# Patient Record
Sex: FEMALE | Race: WHITE | Employment: FULL TIME | URBAN - METROPOLITAN AREA
[De-identification: names, ages, dates, MRNs, and addresses within clinical notes are randomized per-mention and may not be internally consistent; named-entity substitution may affect disease eponyms.]

---

## 2017-04-24 ENCOUNTER — APPOINTMENT (OUTPATIENT)
Dept: CT IMAGING | Age: 57
End: 2017-04-24
Attending: EMERGENCY MEDICINE
Payer: COMMERCIAL

## 2017-04-24 ENCOUNTER — HOSPITAL ENCOUNTER (EMERGENCY)
Age: 57
Discharge: HOME | End: 2017-04-24
Attending: EMERGENCY MEDICINE
Payer: COMMERCIAL

## 2017-04-24 VITALS
HEIGHT: 68 IN | BODY MASS INDEX: 39.4 KG/M2 | OXYGEN SATURATION: 98 % | HEART RATE: 76 BPM | DIASTOLIC BLOOD PRESSURE: 84 MMHG | WEIGHT: 260 LBS | SYSTOLIC BLOOD PRESSURE: 154 MMHG | TEMPERATURE: 98 F | RESPIRATION RATE: 18 BRPM

## 2017-04-24 DIAGNOSIS — N23 RENAL COLIC: Primary | ICD-10-CM

## 2017-04-24 DIAGNOSIS — R31.9 HEMATURIA: ICD-10-CM

## 2017-04-24 LAB
ALBUMIN SERPL BCP-MCNC: 3.7 G/DL (ref 3.5–5)
ALBUMIN/GLOB SERPL: 0.8 {RATIO} (ref 1.2–3.5)
ALP SERPL-CCNC: 106 U/L (ref 50–136)
ALT SERPL-CCNC: 31 U/L (ref 12–65)
ANION GAP BLD CALC-SCNC: 11 MMOL/L (ref 7–16)
AST SERPL W P-5'-P-CCNC: 29 U/L (ref 15–37)
BACTERIA URNS QL MICRO: ABNORMAL /HPF
BASOPHILS # BLD AUTO: 0 K/UL (ref 0–0.2)
BASOPHILS # BLD: 0 % (ref 0–2)
BILIRUB SERPL-MCNC: 0.3 MG/DL (ref 0.2–1.1)
BUN SERPL-MCNC: 16 MG/DL (ref 6–23)
CALCIUM SERPL-MCNC: 9.3 MG/DL (ref 8.3–10.4)
CASTS URNS QL MICRO: 0 /LPF
CHLORIDE SERPL-SCNC: 105 MMOL/L (ref 98–107)
CO2 SERPL-SCNC: 25 MMOL/L (ref 21–32)
CREAT SERPL-MCNC: 0.91 MG/DL (ref 0.6–1)
CRYSTALS URNS QL MICRO: 0 /LPF
DIFFERENTIAL METHOD BLD: ABNORMAL
EOSINOPHIL # BLD: 0.1 K/UL (ref 0–0.8)
EOSINOPHIL NFR BLD: 1 % (ref 0.5–7.8)
EPI CELLS #/AREA URNS HPF: ABNORMAL /HPF
ERYTHROCYTE [DISTWIDTH] IN BLOOD BY AUTOMATED COUNT: 14 % (ref 11.9–14.6)
GLOBULIN SER CALC-MCNC: 4.5 G/DL (ref 2.3–3.5)
GLUCOSE SERPL-MCNC: 140 MG/DL (ref 65–100)
HCT VFR BLD AUTO: 41.2 % (ref 35.8–46.3)
HGB BLD-MCNC: 14 G/DL (ref 11.7–15.4)
IMM GRANULOCYTES # BLD: 0 K/UL (ref 0–0.5)
IMM GRANULOCYTES NFR BLD AUTO: 0.3 % (ref 0–5)
LYMPHOCYTES # BLD AUTO: 16 % (ref 13–44)
LYMPHOCYTES # BLD: 1.9 K/UL (ref 0.5–4.6)
MCH RBC QN AUTO: 30.4 PG (ref 26.1–32.9)
MCHC RBC AUTO-ENTMCNC: 34 G/DL (ref 31.4–35)
MCV RBC AUTO: 89.6 FL (ref 79.6–97.8)
MONOCYTES # BLD: 0.8 K/UL (ref 0.1–1.3)
MONOCYTES NFR BLD AUTO: 7 % (ref 4–12)
MUCOUS THREADS URNS QL MICRO: 0 /LPF
NEUTS SEG # BLD: 9 K/UL (ref 1.7–8.2)
NEUTS SEG NFR BLD AUTO: 76 % (ref 43–78)
PLATELET # BLD AUTO: 280 K/UL (ref 150–450)
PMV BLD AUTO: 12 FL (ref 10.8–14.1)
POTASSIUM SERPL-SCNC: 4.4 MMOL/L (ref 3.5–5.1)
PROT SERPL-MCNC: 8.2 G/DL (ref 6.3–8.2)
RBC # BLD AUTO: 4.6 M/UL (ref 4.05–5.25)
RBC #/AREA URNS HPF: >100 /HPF
SODIUM SERPL-SCNC: 141 MMOL/L (ref 136–145)
WBC # BLD AUTO: 11.8 K/UL (ref 4.3–11.1)
WBC URNS QL MICRO: ABNORMAL /HPF

## 2017-04-24 PROCEDURE — 74176 CT ABD & PELVIS W/O CONTRAST: CPT

## 2017-04-24 PROCEDURE — 99284 EMERGENCY DEPT VISIT MOD MDM: CPT | Performed by: EMERGENCY MEDICINE

## 2017-04-24 PROCEDURE — 74011250636 HC RX REV CODE- 250/636: Performed by: EMERGENCY MEDICINE

## 2017-04-24 PROCEDURE — 96376 TX/PRO/DX INJ SAME DRUG ADON: CPT | Performed by: EMERGENCY MEDICINE

## 2017-04-24 PROCEDURE — 96374 THER/PROPH/DIAG INJ IV PUSH: CPT | Performed by: EMERGENCY MEDICINE

## 2017-04-24 PROCEDURE — 96375 TX/PRO/DX INJ NEW DRUG ADDON: CPT | Performed by: EMERGENCY MEDICINE

## 2017-04-24 PROCEDURE — 80053 COMPREHEN METABOLIC PANEL: CPT | Performed by: EMERGENCY MEDICINE

## 2017-04-24 PROCEDURE — 85025 COMPLETE CBC W/AUTO DIFF WBC: CPT | Performed by: EMERGENCY MEDICINE

## 2017-04-24 PROCEDURE — 81003 URINALYSIS AUTO W/O SCOPE: CPT | Performed by: EMERGENCY MEDICINE

## 2017-04-24 PROCEDURE — 81015 MICROSCOPIC EXAM OF URINE: CPT | Performed by: EMERGENCY MEDICINE

## 2017-04-24 RX ORDER — SODIUM CHLORIDE 0.9 % (FLUSH) 0.9 %
5-10 SYRINGE (ML) INJECTION EVERY 8 HOURS
Status: DISCONTINUED | OUTPATIENT
Start: 2017-04-24 | End: 2017-04-24 | Stop reason: HOSPADM

## 2017-04-24 RX ORDER — ONDANSETRON 2 MG/ML
4 INJECTION INTRAMUSCULAR; INTRAVENOUS
Status: COMPLETED | OUTPATIENT
Start: 2017-04-24 | End: 2017-04-24

## 2017-04-24 RX ORDER — HYDROMORPHONE HYDROCHLORIDE 1 MG/ML
1 INJECTION, SOLUTION INTRAMUSCULAR; INTRAVENOUS; SUBCUTANEOUS
Status: COMPLETED | OUTPATIENT
Start: 2017-04-24 | End: 2017-04-24

## 2017-04-24 RX ORDER — KETOROLAC TROMETHAMINE 30 MG/ML
30 INJECTION, SOLUTION INTRAMUSCULAR; INTRAVENOUS
Status: COMPLETED | OUTPATIENT
Start: 2017-04-24 | End: 2017-04-24

## 2017-04-24 RX ORDER — OXYCODONE HYDROCHLORIDE 5 MG/1
5 TABLET ORAL
Qty: 15 TAB | Refills: 0 | Status: SHIPPED | OUTPATIENT
Start: 2017-04-24 | End: 2017-04-25 | Stop reason: CLARIF

## 2017-04-24 RX ORDER — PROMETHAZINE HYDROCHLORIDE 25 MG/1
25 TABLET ORAL
Qty: 12 TAB | Refills: 1 | Status: SHIPPED | OUTPATIENT
Start: 2017-04-24 | End: 2017-07-29 | Stop reason: ALTCHOICE

## 2017-04-24 RX ORDER — SODIUM CHLORIDE 0.9 % (FLUSH) 0.9 %
5-10 SYRINGE (ML) INJECTION AS NEEDED
Status: DISCONTINUED | OUTPATIENT
Start: 2017-04-24 | End: 2017-04-24 | Stop reason: HOSPADM

## 2017-04-24 RX ADMIN — ONDANSETRON 4 MG: 2 INJECTION INTRAMUSCULAR; INTRAVENOUS at 04:57

## 2017-04-24 RX ADMIN — HYDROMORPHONE HYDROCHLORIDE 1 MG: 1 INJECTION, SOLUTION INTRAMUSCULAR; INTRAVENOUS; SUBCUTANEOUS at 05:15

## 2017-04-24 RX ADMIN — HYDROMORPHONE HYDROCHLORIDE 1 MG: 1 INJECTION, SOLUTION INTRAMUSCULAR; INTRAVENOUS; SUBCUTANEOUS at 03:01

## 2017-04-24 RX ADMIN — ONDANSETRON 4 MG: 2 INJECTION INTRAMUSCULAR; INTRAVENOUS at 03:00

## 2017-04-24 RX ADMIN — KETOROLAC TROMETHAMINE 30 MG: 30 INJECTION, SOLUTION INTRAMUSCULAR at 03:00

## 2017-04-24 NOTE — ED TRIAGE NOTES
Pt c/o pain to back , chronic , worse last few day.  Pt states all pain meds make her nauseated and she needs large dosages of phenergan with pain meds

## 2017-04-24 NOTE — DISCHARGE INSTRUCTIONS
Kidney Stone: Care Instructions  Your Care Instructions    Kidney stones are formed when salts, minerals, and other substances normally found in the urine clump together. They can be as small as grains of sand or, rarely, as large as golf balls. While the stone is traveling through the ureter, which is the tube that carries urine from the kidney to the bladder, you will probably feel pain. The pain may be mild or very severe. You may also have some blood in your urine. As soon as the stone reaches the bladder, any intense pain should go away. If a stone is too large to pass on its own, you may need a medical procedure to help you pass the stone. The doctor has checked you carefully, but problems can develop later. If you notice any problems or new symptoms, get medical treatment right away. Follow-up care is a key part of your treatment and safety. Be sure to make and go to all appointments, and call your doctor if you are having problems. It's also a good idea to know your test results and keep a list of the medicines you take. How can you care for yourself at home? · Drink plenty of fluids, enough so that your urine is light yellow or clear like water. If you have kidney, heart, or liver disease and have to limit fluids, talk with your doctor before you increase the amount of fluids you drink. · Take pain medicines exactly as directed. Call your doctor if you think you are having a problem with your medicine. ¨ If the doctor gave you a prescription medicine for pain, take it as prescribed. ¨ If you are not taking a prescription pain medicine, ask your doctor if you can take an over-the-counter medicine. Read and follow all instructions on the label. · Your doctor may ask you to strain your urine so that you can collect your kidney stone when it passes. You can use a kitchen strainer or a tea strainer to catch the stone. Store it in a plastic bag until you see your doctor again.   Preventing future kidney stones  Some changes in your diet may help prevent kidney stones. Depending on the cause of your stones, your doctor may recommend that you:  · Drink plenty of fluids, enough so that your urine is light yellow or clear like water. If you have kidney, heart, or liver disease and have to limit fluids, talk with your doctor before you increase the amount of fluids you drink. · Limit coffee, tea, and alcohol. Also avoid grapefruit juice. · Do not take more than the recommended daily dose of vitamins C and D.  · Avoid antacids such as Gaviscon, Maalox, Mylanta, or Tums. · Limit the amount of salt (sodium) in your diet. · Eat a balanced diet that is not too high in protein. · Limit foods that are high in a substance called oxalate, which can cause kidney stones. These foods include dark green vegetables, rhubarb, chocolate, wheat bran, nuts, cranberries, and beans. When should you call for help? Call your doctor now or seek immediate medical care if:  · You cannot keep down fluids. · Your pain gets worse. · You have a fever or chills. · You have new or worse pain in your back just below your rib cage (the flank area). · You have new or more blood in your urine. Watch closely for changes in your health, and be sure to contact your doctor if:  · You do not get better as expected. Where can you learn more? Go to http://maurice-paloma.info/. Enter U614 in the search box to learn more about \"Kidney Stone: Care Instructions. \"  Current as of: November 20, 2015  Content Version: 11.2  © 3886-5389 [employer]. Care instructions adapted under license by [employer] (which disclaims liability or warranty for this information). If you have questions about a medical condition or this instruction, always ask your healthcare professional. [address] any warranty or liability for your use of this information.

## 2017-04-24 NOTE — ED NOTES
I have reviewed discharge instructions with the patient. The patient verbalized understanding. Pt left ambulatory with  to drive.

## 2017-04-24 NOTE — ED PROVIDER NOTES
HPI Comments: Patient has been having left back pain which radiates around to her left lower quadrant for the past few days. She has had kidney stones in the past with similar pain though it is been a long time. She denies any aggravating or alleviating factors and denies any dysuria or hematuria. She has been nauseated but has not vomited or had diarrhea. Elements of this note were created using speech recognition software. As such, errors of speech recognition may be present. Patient is a 62 y.o. female presenting with back pain. The history is provided by the patient. Back Pain    Pertinent negatives include no fever. Past Medical History:   Diagnosis Date    Allergic rhinitis     managed w/med    Anterior soft tissue impingement 9/10/2015    Arthritis     back    Chronic pain     back & left leg    Complete tear of right rotator cuff     DDD (degenerative disc disease), lumbar     pt states has 2 bulging disc in lower back that are pinching nerves    History of migraine     Morbid obesity ([facility] [address] 10/30/15    BMI 41.7    Post-operative nausea and vomiting     \"severe\" per pt    Severe motion sickness        Past Surgical History:   Procedure Laterality Date    HX CHOLECYSTECTOMY  1981    HX KNEE ARTHROSCOPY Right 1991    HX ORTHOPAEDIC Right 2004    wrist    HX OTHER SURGICAL  2011    cauterization of nerves to left leg    SINUS SURGERY PROC UNLISTED  2014    Dr. Taurus Bach         Family History:   Problem Relation Age of Onset    Hypertension Mother     Diabetes Father     Diabetes Brother     Diabetes Brother     Diabetes Brother     Breast Cancer Neg Hx        Social History     Social History    Marital status:      Spouse name: N/A    Number of children: N/A    Years of education: N/A     Occupational History    Not on file.      Social History Main Topics    Smoking status: Never Smoker    Smokeless tobacco: Never Used    Alcohol use Yes      Comment: rarely  Drug use: No    Sexual activity: Not on file     Other Topics Concern    Not on file     Social History Narrative         ALLERGIES: Codeine and Penicillins    Review of Systems   Constitutional: Negative for chills and fever. Gastrointestinal: Negative for nausea and vomiting. Musculoskeletal: Positive for back pain. All other systems reviewed and are negative. Vitals:    04/24/17 0201   BP: 195/84   Pulse: (!) 109   Resp: 18   SpO2: 98%   Weight: 117.9 kg (260 lb)   Height: 5' 8\" (1.727 m)            Physical Exam   Constitutional: She is oriented to person, place, and time. She appears well-developed and well-nourished. Obese white female in moderate distress   HENT:   Head: Normocephalic and atraumatic. Eyes: Conjunctivae are normal. Pupils are equal, round, and reactive to light. Neck: Normal range of motion. Neck supple. Musculoskeletal: She exhibits no edema or tenderness. Neurological: She is alert and oriented to person, place, and time. Skin: Skin is warm and dry. Psychiatric: She has a normal mood and affect. Her behavior is normal.   Nursing note and vitals reviewed. MDM  Number of Diagnoses or Management Options  Hematuria: new and does not require workup  Renal colic: new and requires workup  Diagnosis management comments: Differential diagnosis: Renal colic, pyelonephritis, bowel obstruction  4:36 AM discussed results with the patient, need for close follow-up.   She appears more comfortable though she still has a moderate amount of pain       Amount and/or Complexity of Data Reviewed  Clinical lab tests: ordered and reviewed  Tests in the radiology section of CPT®: ordered and reviewed  Independent visualization of images, tracings, or specimens: yes    Risk of Complications, Morbidity, and/or Mortality  Presenting problems: moderate  Diagnostic procedures: moderate  Management options: moderate    Patient Progress  Patient progress: improved    ED Course Procedures

## 2017-04-25 ENCOUNTER — ANESTHESIA EVENT (OUTPATIENT)
Dept: SURGERY | Age: 57
End: 2017-04-25
Payer: COMMERCIAL

## 2017-04-26 ENCOUNTER — ANESTHESIA (OUTPATIENT)
Dept: SURGERY | Age: 57
End: 2017-04-26
Payer: COMMERCIAL

## 2017-04-26 ENCOUNTER — HOSPITAL ENCOUNTER (OUTPATIENT)
Age: 57
Setting detail: OUTPATIENT SURGERY
Discharge: HOME | End: 2017-04-26
Attending: UROLOGY | Admitting: UROLOGY
Payer: COMMERCIAL

## 2017-04-26 VITALS
SYSTOLIC BLOOD PRESSURE: 145 MMHG | HEART RATE: 86 BPM | WEIGHT: 263 LBS | OXYGEN SATURATION: 95 % | BODY MASS INDEX: 39.99 KG/M2 | TEMPERATURE: 98.1 F | DIASTOLIC BLOOD PRESSURE: 64 MMHG | RESPIRATION RATE: 18 BRPM

## 2017-04-26 PROCEDURE — 74011000250 HC RX REV CODE- 250

## 2017-04-26 PROCEDURE — 74011250637 HC RX REV CODE- 250/637: Performed by: ANESTHESIOLOGY

## 2017-04-26 PROCEDURE — 74011250636 HC RX REV CODE- 250/636: Performed by: ANESTHESIOLOGY

## 2017-04-26 PROCEDURE — 76210000021 HC REC RM PH II 0.5 TO 1 HR: Performed by: UROLOGY

## 2017-04-26 PROCEDURE — 77030032490 HC SLV COMPR SCD KNE COVD -B: Performed by: UROLOGY

## 2017-04-26 PROCEDURE — 74011250636 HC RX REV CODE- 250/636: Performed by: UROLOGY

## 2017-04-26 PROCEDURE — 77030008477 HC STYL SATN SLP COVD -A: Performed by: ANESTHESIOLOGY

## 2017-04-26 PROCEDURE — 76210000006 HC OR PH I REC 0.5 TO 1 HR: Performed by: UROLOGY

## 2017-04-26 PROCEDURE — 74011250636 HC RX REV CODE- 250/636

## 2017-04-26 PROCEDURE — 76010000149 HC OR TIME 1 TO 1.5 HR: Performed by: UROLOGY

## 2017-04-26 PROCEDURE — 76060000033 HC ANESTHESIA 1 TO 1.5 HR: Performed by: UROLOGY

## 2017-04-26 PROCEDURE — 77030008703 HC TU ET UNCUF COVD -A: Performed by: ANESTHESIOLOGY

## 2017-04-26 PROCEDURE — 50590 FRAGMENTING OF KIDNEY STONE: CPT | Performed by: UROLOGY

## 2017-04-26 PROCEDURE — 74011000250 HC RX REV CODE- 250: Performed by: ANESTHESIOLOGY

## 2017-04-26 RX ORDER — PROPOFOL 10 MG/ML
INJECTION, EMULSION INTRAVENOUS AS NEEDED
Status: DISCONTINUED | OUTPATIENT
Start: 2017-04-26 | End: 2017-04-26 | Stop reason: HOSPADM

## 2017-04-26 RX ORDER — LIDOCAINE HYDROCHLORIDE 20 MG/ML
INJECTION, SOLUTION EPIDURAL; INFILTRATION; INTRACAUDAL; PERINEURAL AS NEEDED
Status: DISCONTINUED | OUTPATIENT
Start: 2017-04-26 | End: 2017-04-26 | Stop reason: HOSPADM

## 2017-04-26 RX ORDER — SODIUM CHLORIDE 0.9 % (FLUSH) 0.9 %
5-10 SYRINGE (ML) INJECTION EVERY 8 HOURS
Status: DISCONTINUED | OUTPATIENT
Start: 2017-04-26 | End: 2017-04-26 | Stop reason: HOSPADM

## 2017-04-26 RX ORDER — APREPITANT 40 MG/1
40 CAPSULE ORAL ONCE
Status: COMPLETED | OUTPATIENT
Start: 2017-04-26 | End: 2017-04-26

## 2017-04-26 RX ORDER — FLUMAZENIL 0.1 MG/ML
0.2 INJECTION INTRAVENOUS
Status: DISCONTINUED | OUTPATIENT
Start: 2017-04-26 | End: 2017-04-26 | Stop reason: HOSPADM

## 2017-04-26 RX ORDER — OXYCODONE HYDROCHLORIDE 5 MG/1
5 TABLET ORAL
Status: DISCONTINUED | OUTPATIENT
Start: 2017-04-26 | End: 2017-04-26 | Stop reason: HOSPADM

## 2017-04-26 RX ORDER — NALBUPHINE HYDROCHLORIDE 20 MG/ML
5 INJECTION, SOLUTION INTRAMUSCULAR; INTRAVENOUS; SUBCUTANEOUS
Status: DISCONTINUED | OUTPATIENT
Start: 2017-04-26 | End: 2017-04-26 | Stop reason: HOSPADM

## 2017-04-26 RX ORDER — SODIUM CHLORIDE 0.9 % (FLUSH) 0.9 %
5-10 SYRINGE (ML) INJECTION AS NEEDED
Status: DISCONTINUED | OUTPATIENT
Start: 2017-04-26 | End: 2017-04-26 | Stop reason: HOSPADM

## 2017-04-26 RX ORDER — NALOXONE HYDROCHLORIDE 0.4 MG/ML
0.1 INJECTION, SOLUTION INTRAMUSCULAR; INTRAVENOUS; SUBCUTANEOUS
Status: DISCONTINUED | OUTPATIENT
Start: 2017-04-26 | End: 2017-04-26 | Stop reason: HOSPADM

## 2017-04-26 RX ORDER — ONDANSETRON 2 MG/ML
4 INJECTION INTRAMUSCULAR; INTRAVENOUS
Status: DISCONTINUED | OUTPATIENT
Start: 2017-04-26 | End: 2017-04-26 | Stop reason: HOSPADM

## 2017-04-26 RX ORDER — FAMOTIDINE 10 MG/ML
20 INJECTION INTRAVENOUS ONCE
Status: COMPLETED | OUTPATIENT
Start: 2017-04-26 | End: 2017-04-26

## 2017-04-26 RX ORDER — FENTANYL CITRATE 50 UG/ML
INJECTION, SOLUTION INTRAMUSCULAR; INTRAVENOUS AS NEEDED
Status: DISCONTINUED | OUTPATIENT
Start: 2017-04-26 | End: 2017-04-26 | Stop reason: HOSPADM

## 2017-04-26 RX ORDER — LIDOCAINE HYDROCHLORIDE 10 MG/ML
0.1 INJECTION INFILTRATION; PERINEURAL AS NEEDED
Status: DISCONTINUED | OUTPATIENT
Start: 2017-04-26 | End: 2017-04-26 | Stop reason: HOSPADM

## 2017-04-26 RX ORDER — SUCCINYLCHOLINE CHLORIDE 20 MG/ML
INJECTION INTRAMUSCULAR; INTRAVENOUS AS NEEDED
Status: DISCONTINUED | OUTPATIENT
Start: 2017-04-26 | End: 2017-04-26 | Stop reason: HOSPADM

## 2017-04-26 RX ORDER — SODIUM CHLORIDE, SODIUM LACTATE, POTASSIUM CHLORIDE, CALCIUM CHLORIDE 600; 310; 30; 20 MG/100ML; MG/100ML; MG/100ML; MG/100ML
100 INJECTION, SOLUTION INTRAVENOUS CONTINUOUS
Status: DISCONTINUED | OUTPATIENT
Start: 2017-04-26 | End: 2017-04-26 | Stop reason: HOSPADM

## 2017-04-26 RX ORDER — DEXAMETHASONE SODIUM PHOSPHATE 4 MG/ML
INJECTION, SOLUTION INTRA-ARTICULAR; INTRALESIONAL; INTRAMUSCULAR; INTRAVENOUS; SOFT TISSUE AS NEEDED
Status: DISCONTINUED | OUTPATIENT
Start: 2017-04-26 | End: 2017-04-26 | Stop reason: HOSPADM

## 2017-04-26 RX ORDER — HYDROMORPHONE HYDROCHLORIDE 2 MG/ML
0.5 INJECTION, SOLUTION INTRAMUSCULAR; INTRAVENOUS; SUBCUTANEOUS
Status: DISCONTINUED | OUTPATIENT
Start: 2017-04-26 | End: 2017-04-26 | Stop reason: HOSPADM

## 2017-04-26 RX ORDER — CIPROFLOXACIN 2 MG/ML
400 INJECTION, SOLUTION INTRAVENOUS ONCE
Status: COMPLETED | OUTPATIENT
Start: 2017-04-26 | End: 2017-04-26

## 2017-04-26 RX ORDER — ROCURONIUM BROMIDE 10 MG/ML
INJECTION, SOLUTION INTRAVENOUS AS NEEDED
Status: DISCONTINUED | OUTPATIENT
Start: 2017-04-26 | End: 2017-04-26 | Stop reason: HOSPADM

## 2017-04-26 RX ORDER — SCOLOPAMINE TRANSDERMAL SYSTEM 1 MG/1
1.5 PATCH, EXTENDED RELEASE TRANSDERMAL
Status: DISCONTINUED | OUTPATIENT
Start: 2017-04-26 | End: 2017-04-26 | Stop reason: HOSPADM

## 2017-04-26 RX ORDER — MIDAZOLAM HYDROCHLORIDE 1 MG/ML
2 INJECTION, SOLUTION INTRAMUSCULAR; INTRAVENOUS
Status: DISCONTINUED | OUTPATIENT
Start: 2017-04-26 | End: 2017-04-26 | Stop reason: HOSPADM

## 2017-04-26 RX ADMIN — SUCCINYLCHOLINE CHLORIDE 140 MG: 20 INJECTION INTRAMUSCULAR; INTRAVENOUS at 09:55

## 2017-04-26 RX ADMIN — ROCURONIUM BROMIDE 5 MG: 10 INJECTION, SOLUTION INTRAVENOUS at 09:55

## 2017-04-26 RX ADMIN — APREPITANT 40 MG: 40 CAPSULE ORAL at 09:10

## 2017-04-26 RX ADMIN — PROPOFOL 200 MG: 10 INJECTION, EMULSION INTRAVENOUS at 09:55

## 2017-04-26 RX ADMIN — MIDAZOLAM HYDROCHLORIDE 2 MG: 1 INJECTION, SOLUTION INTRAMUSCULAR; INTRAVENOUS at 09:11

## 2017-04-26 RX ADMIN — DEXAMETHASONE SODIUM PHOSPHATE 8 MG: 4 INJECTION, SOLUTION INTRA-ARTICULAR; INTRALESIONAL; INTRAMUSCULAR; INTRAVENOUS; SOFT TISSUE at 10:06

## 2017-04-26 RX ADMIN — FENTANYL CITRATE 100 MCG: 50 INJECTION, SOLUTION INTRAMUSCULAR; INTRAVENOUS at 09:55

## 2017-04-26 RX ADMIN — LIDOCAINE HYDROCHLORIDE 60 MG: 20 INJECTION, SOLUTION EPIDURAL; INFILTRATION; INTRACAUDAL; PERINEURAL at 09:55

## 2017-04-26 RX ADMIN — SODIUM CHLORIDE, SODIUM LACTATE, POTASSIUM CHLORIDE, AND CALCIUM CHLORIDE 100 ML/HR: 600; 310; 30; 20 INJECTION, SOLUTION INTRAVENOUS at 09:10

## 2017-04-26 RX ADMIN — CIPROFLOXACIN 400 MG: 2 INJECTION, SOLUTION INTRAVENOUS at 09:49

## 2017-04-26 RX ADMIN — FAMOTIDINE 20 MG: 10 INJECTION, SOLUTION INTRAVENOUS at 09:10

## 2017-04-26 NOTE — OP NOTES
Operative Note                Patient: Maribel Lou, [id number]    Date of Surgery: 04/26/17    Preoperative Diagnosis: 5 mm LEFT mid ureteral stone    Postoperative Diagnosis:  same    Surgeon(s) and Role:     * Ramona Reyez MD - Primary     Anesthesia:  General     Procedure: Procedure(s):  LEFT EXTRACORPORAL SHOCKWAVE LITHOTRIPSY (ESWL)     Indications:     Discussed the risk of surgery including infection, hematoma, bleeding, recurrence or persistence of symptoms or stone, and the risks of general anesthetic. The patient understands the risks, any and all questions were answered to the patient's satisfaction and they freely signed the consent for operation. Procedure in Detail:  Patient was taken to the lithotripsy suite. Anesthesia was induced via the anesthesiology service. Using flouroscopy for guidance, a total of 3949 shocks were delivered in a non-gaited fashion. No cardiac ectopy was experienced. Shock rate was begun at 60 shocks per minute and then increased to 90 shocks per minute. Energy level was begun at 7 and gradually increased to a maximum of 11. Findings: Patient tolerated the procedure well. At the end of the procedure, the stone appeared to have fractured.       Estimated Blood Loss:  none    Specimens: * No specimens in log *             Drains: none                 Implants: * No implants in log *           Signed By: Brendalyn Councilman, MD

## 2017-04-26 NOTE — IP AVS SNAPSHOT
[address] 
[phone] Patient: Lucy Cardenas MRN: [id number] You are allergic to the following Allergen Reactions Codeine Nausea and Vomiting Pt states \"a lot of pain medications maker her sick\" Penicillins Nausea and Vomiting Pt states possible allergy to PCN Recent Documentation Weight BMI OB Status Smoking Status  
  
  
 119.3 kg 39.99 kg/m2 Postmenopausal Never Smoker Emergency Contacts Name Discharge Info Relation Home Work Mobile Avani Cannon  Friend [5] [phone] About your hospitalization You were admitted on:  April 26, 2017 You last received care in the:  [facility] OP PACU You were discharged on:  April 26, 2017 Unit phone number:  [phone] Why you were hospitalized Your primary diagnosis was:  Not on File Providers Seen During Your Hospitalizations Provider Role Specialty Primary office phone Delmi Adan MD Attending Provider Urology [phone] Your Primary Care Physician (PCP) Primary Care Physician Office Phone Office Fax Caro Grimm [phone] Follow-up Information Follow up With Details Comments Contact Info Yury Noble MD   [address] [place] [address] [phone] Your Appointments Monday May 15, 2017  2:45 PM EDT Office Visit with Delmi Adan MD  
[facility] ([facility] [place] [address]) [address] [address]  
[phone] Current Discharge Medication List  
  
CONTINUE these medications which have CHANGED Dose & Instructions Dispensing Information Comments Morning Noon Evening Bedtime  
 estrogen (conjugated)-medroxyPROGESTERone 0.625 mg (14)/ 0.625mg-5mg(14) per tablet Commonly known as:  PREMPHASE  
 What changed:   
- when to take this 
- additional instructions Your last dose was: Your next dose is:    
   
   
 Dose:  1 Tab Take 1 Tab by mouth daily. Instructed to take DOS per Anesthesia guidelines. Quantity:  90 Tab Refills:  1  
     
   
   
   
  
 levocetirizine 5 mg tablet Commonly known as:  Abeba Avalos What changed:  additional instructions Your last dose was: Your next dose is:    
   
   
 Dose:  5 mg Take 1 Tab by mouth daily. Instructed to take DOS per Anesthesia guidelines. Indications: ALLERGIC RHINITIS Quantity:  90 Tab Refills:  3 CONTINUE these medications which have NOT CHANGED Dose & Instructions Dispensing Information Comments Morning Noon Evening Bedtime  
 celecoxib 200 mg capsule Commonly known as:  CeleBREX Your last dose was: Your next dose is:    
   
   
 Dose:  200 mg Take 1 Cap by mouth two (2) times a day. Pt states has NOT taken her celebrex since starting indomethacin. Indications: OSTEOARTHRITIS Quantity:  180 Cap Refills:  3  
     
   
   
   
  
 fluocinoNIDE 0.05 % ointment Commonly known as:  LIDEX Your last dose was: Your next dose is:    
   
   
 Apply  to affected area two (2) times a day. Quantity:  15 g Refills:  0  
     
   
   
   
  
 hydrocortisone valerate 0.2 % ointment Commonly known as:  Coca-Cola Your last dose was: Your next dose is:    
   
   
 Apply  to affected area two (2) times daily as needed. use thin layer Quantity:  15 g Refills:  0 HYDROmorphone 2 mg tablet Commonly known as:  DILAUDID Your last dose was: Your next dose is:    
   
   
 Dose:  2-4 mg Take 1-2 Tabs by mouth every eight (8) hours as needed for Pain. Max Daily Amount: 12 mg. Quantity:  30 Tab Refills:  0  
     
   
   
   
  
 NASACORT AQ 55 mcg nasal inhaler Generic drug:  triamcinolone Your last dose was: Your next dose is:    
   
   
 Dose:  1 Spray 1 [place] by Both Nostrils route daily. Instructed to take DOS per Anesthesia guidelines. Indications: ALLERGIC RHINITIS Refills:  0  
     
   
   
   
  
 olopatadine 0.6 % Spry Commonly known as:  PATANASE Your last dose was: Your next dose is:    
   
   
 Dose:  2 Squirt 2 Squirts by Both Nostrils route nightly. Indications: ALLERGIC RHINITIS Quantity:  1 Bottle Refills:  11  
     
   
   
   
  
 promethazine 25 mg tablet Commonly known as:  PHENERGAN Your last dose was: Your next dose is:    
   
   
 Dose:  25 mg Take 1 Tab by mouth every six (6) hours as needed for up to 15 doses. Quantity:  12 Tab Refills:  1 SUDAFED 24 HOUR 240 mg Tb24 tablet Generic drug:  pseudoephedrine ER Your last dose was: Your next dose is:    
   
   
  Refills:  2 Discharge Instructions Lithotripsy: What to Expect at [facility] Your Recovery Lithotripsy is a way to treat kidney stones without surgery. It is also called extracorporeal shock wave lithotripsy, or ESWL. This treatment uses sound waves to break kidney stones into tiny pieces. These pieces can then pass out of the body in the urine. You may have a small amount of blood in your urine after this treatment. Your urine may be slightly pink or reddish. The blood in the urine often goes away after 2 days This care sheet gives you a general idea about how long it will take for you to recover. But each person recovers at a different pace. Follow the steps below to feel better as quickly as possible. How can you care for yourself at home? Activity · Rest as much as you need to after you go home. · You may do your regular activities. But avoid hard exercise or sports for a week. Wait until there is no blood in your urine and the stent is out. Diet · You can eat your normal diet. · Drink plenty of fluids, enough so that your urine is light yellow or clear like water. If you have kidney, heart, or liver disease and have to limit fluids, talk with your doctor before you increase the amount of fluids you drink. Medicines · Your doctor will tell you if and when you can restart your medicines. He or she will also give you instructions about taking any new medicines. · If you take blood thinners, such as warfarin (Coumadin), clopidogrel (Plavix), or aspirin, be sure to talk to your doctor. He or she will tell you if and when to start taking those medicines again. Make sure that you understand exactly what your doctor wants you to do. · Be safe with medicines. Read and follow all instructions on the label. ¨ If the doctor gave you a prescription medicine for pain, take it as prescribed. ¨ If you are not taking a prescription pain medicine, ask your doctor if you can take acetaminophen (Tylenol). Do not take ibuprofen (Advil, Motrin) or naproxen (Aleve), or similar medicines unless your doctor tells you to. ¨ Do not take two or more pain medicines at the same time unless the doctor told you to. Many pain medicines have acetaminophen, which is Tylenol. Too much acetaminophen (Tylenol) can be harmful. Other instructions · Urinate through the strainer the doctor gives you. Save any stone pieces, including those that look like sand or gravel. Take these to your doctor. This will help your doctor find the cause of your stones. Follow-up care is a key part of your treatment and safety. Be sure to make and go to all appointments, and call your doctor if you are having problems. It's also a good idea to know your test results and keep a list of the medicines you take. When should you call for help? Call your doctor now or seek immediate medical care if: 
· You have severe pain that does not get better after you take pain medicine. · You have severe pain when you urinate. · You have a fever over 100°F. 
· You are not able to urinate within 6 to 8 hours after the procedure. · Your urine is still bright red 48 hours after the procedure. Watch closely for any changes in your health, and be sure to contact your doctor if: 
· You do not get better as expected. Where can you learn more? Go to http://maurice-paloma.info/. Enter T851 in the search box to learn more about \"Lithotripsy: What to Expect at Home. \" Current as of: November 20, 2015 Content Version: 11.2 © 6712-5948 [employer]. Care instructions adapted under license by [employer] (which disclaims liability or warranty for this information). If you have questions about a medical condition or this instruction, always ask your healthcare professional. [address] any warranty or liability for your use of this information. ACTIVITY Avoid vigorous activity while blood in urine · As tolerated and as directed by your doctor. · Bathe or shower as directed by your doctor. DIET · Clear liquids until no nausea or vomiting; then light diet for the first day. · Advance to regular diet on second day, unless your doctor orders otherwise. · If nausea and vomiting continues, call your doctor. · Avoid greasy and spicy food today to reduce nausea. PAIN 
· Take pain medication as directed by your doctor. · Call your doctor if pain is NOT relieved by medication. · DO NOT take aspirin of blood thinners unless directed by your doctor. · Take pain pills with food to reduce nausea · Pain pills may cause constipation. May use stool softener AFTER ANESTHESIA · For the first 24 hours: DO NOT Drive, Drink alcoholic beverages, or Make important decisions. · Be aware of dizziness following anesthesia and while taking pain medication. APPOINTMENT DATE/ TIME: May 15, 2017 at 2:45 p.m.  At  [address] with [address] Chidi Eagle (kidney xray) YOUR DOCTOR'S PHONE NUMBER [phone] DISCHARGE SUMMARY from Nurse PATIENT INSTRUCTIONS: 
 
After general anesthesia or intravenous sedation, for 24 hours or while taking prescription Narcotics: · Limit your activities · Do not drive and operate hazardous machinery · Do not make important personal or business decisions · Do  not drink alcoholic beverages · If you have not urinated within 8 hours after discharge, please contact your surgeon on call. *  Please give a list of your current medications to your Primary Care Provider. *  Please update this list whenever your medications are discontinued, doses are 
    changed, or new medications (including over-the-counter products) are added. *  Please carry medication information at all times in case of emergency situations. These are general instructions for a healthy lifestyle: No smoking/ No tobacco products/ Avoid exposure to second hand smoke Surgeon General's Warning:  Quitting smoking now greatly reduces serious risk to your health. Obesity, smoking, and sedentary lifestyle greatly increases your risk for illness A healthy diet, regular physical exercise & weight monitoring are important for maintaining a healthy lifestyle You may be retaining fluid if you have a history of heart failure or if you experience any of the following symptoms:  Weight gain of 3 pounds or more overnight or 5 pounds in a week, increased swelling in our hands or feet or shortness of breath while lying flat in bed. Please call your doctor as soon as you notice any of these symptoms; do not wait until your next office visit. Recognize signs and symptoms of STROKE: 
 
F-face looks uneven A-arms unable to move or move unevenly S-speech slurred or non-existent T-time-call 911 as soon as signs and symptoms begin-DO NOT go Back to bed or wait to see if you get better-TIME IS BRAIN. Discharge Orders None Introducing [facility]! Dear Lisa Wall: 
Thank you for requesting a [employer] account. Our records indicate that you already have an active Interview[employer] account. You can access your account anytime at [email or web address] Did you know that you can access your hospital and ER discharge instructions at any time in Interviewstreet? You can also review all of your test results from your hospital stay or ER visit. Additional Information If you have questions, please visit the Frequently Asked Questions section of the Interviewstreet website at [email or web address] Remember, Interviewstreet is NOT to be used for urgent needs. For medical emergencies, dial 911. Now available from your iPhone and Android! General Information Please provide this summary of care documentation to your next provider. Patient Signature:  ____________________________________________________________ Date:  ____________________________________________________________  
  
[place] Has Provider Signature:  ____________________________________________________________ Date:  ____________________________________________________________

## 2017-04-26 NOTE — IP AVS SNAPSHOT
Current Discharge Medication List  
  
CONTINUE these medications which have CHANGED Dose & Instructions Dispensing Information Comments Morning Noon Evening Bedtime  
 estrogen (conjugated)-medroxyPROGESTERone 0.625 mg (14)/ 0.625mg-5mg(14) per tablet Commonly known as:  PREMPHASE What changed:   
- when to take this 
- additional instructions Your last dose was: Your next dose is:    
   
   
 Dose:  1 Tab Take 1 Tab by mouth daily. Instructed to take DOS per Anesthesia guidelines. Quantity:  90 Tab Refills:  1  
     
   
   
   
  
 levocetirizine 5 mg tablet Commonly known as:  Anali Round What changed:  additional instructions Your last dose was: Your next dose is:    
   
   
 Dose:  5 mg Take 1 Tab by mouth daily. Instructed to take DOS per Anesthesia guidelines. Indications: ALLERGIC RHINITIS Quantity:  90 Tab Refills:  3 CONTINUE these medications which have NOT CHANGED Dose & Instructions Dispensing Information Comments Morning Noon Evening Bedtime  
 celecoxib 200 mg capsule Commonly known as:  CeleBREX Your last dose was: Your next dose is:    
   
   
 Dose:  200 mg Take 1 Cap by mouth two (2) times a day. Pt states has NOT taken her celebrex since starting indomethacin. Indications: OSTEOARTHRITIS Quantity:  180 Cap Refills:  3  
     
   
   
   
  
 fluocinoNIDE 0.05 % ointment Commonly known as:  LIDEX Your last dose was: Your next dose is:    
   
   
 Apply  to affected area two (2) times a day. Quantity:  15 g Refills:  0  
     
   
   
   
  
 hydrocortisone valerate 0.2 % ointment Commonly known as:  Coca-Cola Your last dose was: Your next dose is:    
   
   
 Apply  to affected area two (2) times daily as needed. use thin layer Quantity:  15 g Refills:  0 HYDROmorphone 2 mg tablet Commonly known as:  DILAUDID Your last dose was: Your next dose is:    
   
   
 Dose:  2-4 mg Take 1-2 Tabs by mouth every eight (8) hours as needed for Pain. Max Daily Amount: 12 mg. Quantity:  30 Tab Refills:  0  
     
   
   
   
  
 NASACORT AQ 55 mcg nasal inhaler Generic drug:  triamcinolone Your last dose was: Your next dose is:    
   
   
 Dose:  1 Spray 1 [place] by Both Nostrils route daily. Instructed to take DOS per Anesthesia guidelines. Indications: ALLERGIC RHINITIS Refills:  0  
     
   
   
   
  
 olopatadine 0.6 % Spry Commonly known as:  PATANASE Your last dose was: Your next dose is:    
   
   
 Dose:  2 Squirt 2 Squirts by Both Nostrils route nightly. Indications: ALLERGIC RHINITIS Quantity:  1 Bottle Refills:  11  
     
   
   
   
  
 promethazine 25 mg tablet Commonly known as:  PHENERGAN Your last dose was: Your next dose is:    
   
   
 Dose:  25 mg Take 1 Tab by mouth every six (6) hours as needed for up to 15 doses. Quantity:  12 Tab Refills:  1 SUDAFED 24 HOUR 240 mg Tb24 tablet Generic drug:  pseudoephedrine ER Your last dose was: Your next dose is:    
   
   
  Refills:  2

## 2017-04-26 NOTE — ANESTHESIA POSTPROCEDURE EVALUATION
Post-Anesthesia Evaluation and Assessment    Patient: Marisela Hathaway MRN: [id number]  SSN: [id number]    YOB: 1960  Age: 62 y.o. Sex: female       Cardiovascular Function/Vital Signs  Visit Vitals    /64 (BP 1 Location: Left arm, BP Patient Position: At rest)    Pulse 86    Temp 36.7 °C (98.1 °F)    Resp 18    Wt 119.3 kg (263 lb)    SpO2 95%    BMI 39.99 kg/m2       Patient is status post general anesthesia for Procedure(s):  LITHOTRIPSY EXTRACORPOREAL SHOCKWAVE ESWL / LEFT. Nausea/Vomiting: None    Postoperative hydration reviewed and adequate. Pain:  Pain Scale 1: Numeric (0 - 10) (04/26/17 1108)  Pain Intensity 1: 0 (04/26/17 1108)   Managed    Neurological Status:   Neuro (WDL): Exceptions to WDL (04/26/17 1108)  Neuro  Neurologic State: Anesthetized; Appropriate for age;Drowsy (04/26/17 1108)   At baseline    Mental Status and Level of Consciousness: Arousable    Pulmonary Status:   O2 Device: Room air (04/26/17 1154)   Adequate oxygenation and airway patent    Complications related to anesthesia: None    Post-anesthesia assessment completed.  No concerns    Signed By: Devan Fuentes MD     April 26, 2017

## 2017-04-26 NOTE — PERIOP NOTES
Preoperative flank skin condition: Dry, intact  Lead shield: Yes  Patient ear protection: Yes   Gel applied to patient's flank and Lithotripsy head: Yes   Starting power level: 3  Shock start time (first  fluoroscopy): 0951  Shock stop time (last lithotripsy shock): 1058  Ending power level: 11  Total shocks: 3949  Total fluoroscopy time: 4:55  Postoperative flank skin condition: Red

## 2017-04-26 NOTE — DISCHARGE INSTRUCTIONS
Lithotripsy: What to Expect at [address] is a way to treat kidney stones without surgery. It is also called extracorporeal shock wave lithotripsy, or ESWL. This treatment uses sound waves to break kidney stones into tiny pieces. These pieces can then pass out of the body in the urine. You may have a small amount of blood in your urine after this treatment. Your urine may be slightly pink or reddish. The blood in the urine often goes away after 2 days  This care sheet gives you a general idea about how long it will take for you to recover. But each person recovers at a different pace. Follow the steps below to feel better as quickly as possible. How can you care for yourself at home? Activity  · Rest as much as you need to after you go home. · You may do your regular activities. But avoid hard exercise or sports for a week. Wait until there is no blood in your urine and the stent is out. Diet  · You can eat your normal diet. · Drink plenty of fluids, enough so that your urine is light yellow or clear like water. If you have kidney, heart, or liver disease and have to limit fluids, talk with your doctor before you increase the amount of fluids you drink. Medicines  · Your doctor will tell you if and when you can restart your medicines. He or she will also give you instructions about taking any new medicines. · If you take blood thinners, such as warfarin (Coumadin), clopidogrel (Plavix), or aspirin, be sure to talk to your doctor. He or she will tell you if and when to start taking those medicines again. Make sure that you understand exactly what your doctor wants you to do. · Be safe with medicines. Read and follow all instructions on the label. ¨ If the doctor gave you a prescription medicine for pain, take it as prescribed. ¨ If you are not taking a prescription pain medicine, ask your doctor if you can take acetaminophen (Tylenol).  Do not take ibuprofen (Advil, Motrin) or naproxen (Aleve), or similar medicines unless your doctor tells you to. ¨ Do not take two or more pain medicines at the same time unless the doctor told you to. Many pain medicines have acetaminophen, which is Tylenol. Too much acetaminophen (Tylenol) can be harmful. Other instructions  · Urinate through the strainer the doctor gives you. Save any stone pieces, including those that look like sand or gravel. Take these to your doctor. This will help your doctor find the cause of your stones. Follow-up care is a key part of your treatment and safety. Be sure to make and go to all appointments, and call your doctor if you are having problems. It's also a good idea to know your test results and keep a list of the medicines you take. When should you call for help? Call your doctor now or seek immediate medical care if:  · You have severe pain that does not get better after you take pain medicine. · You have severe pain when you urinate. · You have a fever over 100°F.  · You are not able to urinate within 6 to 8 hours after the procedure. · Your urine is still bright red 48 hours after the procedure. Watch closely for any changes in your health, and be sure to contact your doctor if:  · You do not get better as expected. Where can you learn more? Go to http://maurice-paloma.info/. Enter X566 in the search box to learn more about \"Lithotripsy: What to Expect at Home. \"  Current as of: November 20, 2015  Content Version: 11.2  © 6153-9914 [employer]. Care instructions adapted under license by [employer] (which disclaims liability or warranty for this information). If you have questions about a medical condition or this instruction, always ask your healthcare professional. [address] any warranty or liability for your use of this information. ACTIVITY  Avoid vigorous activity while blood in urine  · As tolerated and as directed by your doctor. · Bathe or shower as directed by your doctor. DIET  · Clear liquids until no nausea or vomiting; then light diet for the first day. · Advance to regular diet on second day, unless your doctor orders otherwise. · If nausea and vomiting continues, call your doctor. · Avoid greasy and spicy food today to reduce nausea. PAIN  · Take pain medication as directed by your doctor. · Call your doctor if pain is NOT relieved by medication. · DO NOT take aspirin of blood thinners unless directed by your doctor. · Take pain pills with food to reduce nausea  · Pain pills may cause constipation. May use stool softener            AFTER ANESTHESIA   · For the first 24 hours: DO NOT Drive, Drink alcoholic beverages, or Make important decisions. · Be aware of dizziness following anesthesia and while taking pain medication. APPOINTMENT DATE/ TIME: May 15, 2017 at 2:45 p.m. At  [address] [place] [address] [phone] with Dr. Gaye Eagle and RUSSELL (kidney xray)    YOUR DOCTOR'S PHONE NUMBER [phone]      DISCHARGE SUMMARY from Nurse    PATIENT INSTRUCTIONS:    After general anesthesia or intravenous sedation, for 24 hours or while taking prescription Narcotics:  · Limit your activities  · Do not drive and operate hazardous machinery  · Do not make important personal or business decisions  · Do  not drink alcoholic beverages  · If you have not urinated within 8 hours after discharge, please contact your surgeon on call. *  Please give a list of your current medications to your Primary Care Provider. *  Please update this list whenever your medications are discontinued, doses are      changed, or new medications (including over-the-counter products) are added. *  Please carry medication information at all times in case of emergency situations.       These are general instructions for a healthy lifestyle:    No smoking/ No tobacco products/ Avoid exposure to second hand smoke    Surgeon General's Warning:  Quitting smoking now greatly reduces serious risk to your health. Obesity, smoking, and sedentary lifestyle greatly increases your risk for illness    A healthy diet, regular physical exercise & weight monitoring are important for maintaining a healthy lifestyle    You may be retaining fluid if you have a history of heart failure or if you experience any of the following symptoms:  Weight gain of 3 pounds or more overnight or 5 pounds in a week, increased swelling in our hands or feet or shortness of breath while lying flat in bed. Please call your doctor as soon as you notice any of these symptoms; do not wait until your next office visit. Recognize signs and symptoms of STROKE:    F-face looks uneven    A-arms unable to move or move unevenly    S-speech slurred or non-existent    T-time-call 911 as soon as signs and symptoms begin-DO NOT go       Back to bed or wait to see if you get better-TIME IS BRAIN.

## 2017-04-26 NOTE — ANESTHESIA PREPROCEDURE EVALUATION
Anesthetic History     PONV          Review of Systems / Medical History  Patient summary reviewed and pertinent labs reviewed    Pulmonary  Within defined limits                 Neuro/Psych         Headaches     Cardiovascular                  Exercise tolerance: >4 METS     GI/Hepatic/Renal  Within defined limits              Endo/Other        Morbid obesity and arthritis     Other Findings   Comments: Chronic back pain           Physical Exam    Airway  Mallampati: III  TM Distance: 4 - 6 cm  Neck ROM: normal range of motion   Mouth opening: Normal     Cardiovascular    Rhythm: regular  Rate: normal         Dental    Dentition: Caps/crowns     Pulmonary  Breath sounds clear to auscultation               Abdominal  GI exam deferred       Other Findings            Anesthetic Plan    ASA: 3  Anesthesia type: general          Induction: Intravenous  Anesthetic plan and risks discussed with: Patient      Severe PONV.   Scop patch on

## 2017-05-01 ENCOUNTER — HOSPITAL ENCOUNTER (OUTPATIENT)
Dept: CT IMAGING | Age: 57
Discharge: HOME | End: 2017-05-01
Attending: NURSE PRACTITIONER
Payer: COMMERCIAL

## 2017-05-01 DIAGNOSIS — N20.1 URETERAL STONE: ICD-10-CM

## 2017-05-01 DIAGNOSIS — R50.9 FEVER, UNSPECIFIED FEVER CAUSE: ICD-10-CM

## 2017-05-01 DIAGNOSIS — N39.0 URINARY TRACT INFECTION WITH HEMATURIA, SITE UNSPECIFIED: ICD-10-CM

## 2017-05-01 DIAGNOSIS — R31.9 URINARY TRACT INFECTION WITH HEMATURIA, SITE UNSPECIFIED: ICD-10-CM

## 2017-05-01 PROCEDURE — 74176 CT ABD & PELVIS W/O CONTRAST: CPT

## 2018-03-20 ENCOUNTER — HOSPITAL ENCOUNTER (OUTPATIENT)
Dept: MAMMOGRAPHY | Age: 58
Discharge: HOME | End: 2018-03-20
Attending: FAMILY MEDICINE
Payer: COMMERCIAL

## 2018-03-20 DIAGNOSIS — Z12.39 BREAST CANCER SCREENING: ICD-10-CM

## 2018-03-20 PROCEDURE — 77067 SCR MAMMO BI INCL CAD: CPT

## 2018-03-26 ENCOUNTER — HOSPITAL ENCOUNTER (OUTPATIENT)
Dept: MAMMOGRAPHY | Age: 58
Discharge: HOME | End: 2018-03-26
Attending: FAMILY MEDICINE
Payer: COMMERCIAL

## 2018-03-26 DIAGNOSIS — R92.8 ABNORMAL SCREENING MAMMOGRAM: ICD-10-CM

## 2018-03-26 PROCEDURE — 77065 DX MAMMO INCL CAD UNI: CPT

## 2018-03-26 PROCEDURE — 76642 ULTRASOUND BREAST LIMITED: CPT

## 2018-03-29 ENCOUNTER — ANESTHESIA EVENT (OUTPATIENT)
Dept: SURGERY | Age: 58
End: 2018-03-29
Payer: COMMERCIAL

## 2018-03-30 ENCOUNTER — ANESTHESIA (OUTPATIENT)
Dept: SURGERY | Age: 58
End: 2018-03-30
Payer: COMMERCIAL

## 2018-03-30 ENCOUNTER — HOSPITAL ENCOUNTER (OUTPATIENT)
Age: 58
Setting detail: OUTPATIENT SURGERY
Discharge: HOME | End: 2018-03-30
Attending: ORTHOPAEDIC SURGERY | Admitting: ORTHOPAEDIC SURGERY
Payer: COMMERCIAL

## 2018-03-30 VITALS
WEIGHT: 277 LBS | BODY MASS INDEX: 42.12 KG/M2 | SYSTOLIC BLOOD PRESSURE: 145 MMHG | TEMPERATURE: 97.9 F | HEART RATE: 89 BPM | DIASTOLIC BLOOD PRESSURE: 78 MMHG | RESPIRATION RATE: 12 BRPM | OXYGEN SATURATION: 98 %

## 2018-03-30 PROCEDURE — 74011250636 HC RX REV CODE- 250/636: Performed by: ORTHOPAEDIC SURGERY

## 2018-03-30 PROCEDURE — 74011250636 HC RX REV CODE- 250/636

## 2018-03-30 PROCEDURE — 77030018836 HC SOL IRR NACL ICUM -A: Performed by: ORTHOPAEDIC SURGERY

## 2018-03-30 PROCEDURE — 74011250637 HC RX REV CODE- 250/637: Performed by: ANESTHESIOLOGY

## 2018-03-30 PROCEDURE — 76010000138 HC OR TIME 0.5 TO 1 HR: Performed by: ORTHOPAEDIC SURGERY

## 2018-03-30 PROCEDURE — 77030020143 HC AIRWY LARYN INTUB CGAS -A: Performed by: ANESTHESIOLOGY

## 2018-03-30 PROCEDURE — 77030000032 HC CUF TRNQT ZIMM -B: Performed by: ORTHOPAEDIC SURGERY

## 2018-03-30 PROCEDURE — 77030029203 HC IRR KT CYSTO/TUR BBMI -A: Performed by: ORTHOPAEDIC SURGERY

## 2018-03-30 PROCEDURE — 77030006668 HC BLD SHV MENSCS STRY -B: Performed by: ORTHOPAEDIC SURGERY

## 2018-03-30 PROCEDURE — 76210000021 HC REC RM PH II 0.5 TO 1 HR: Performed by: ORTHOPAEDIC SURGERY

## 2018-03-30 PROCEDURE — 76060000032 HC ANESTHESIA 0.5 TO 1 HR: Performed by: ORTHOPAEDIC SURGERY

## 2018-03-30 PROCEDURE — 74011000250 HC RX REV CODE- 250

## 2018-03-30 PROCEDURE — 76210000063 HC OR PH I REC FIRST 0.5 HR: Performed by: ORTHOPAEDIC SURGERY

## 2018-03-30 PROCEDURE — 74011250636 HC RX REV CODE- 250/636: Performed by: ANESTHESIOLOGY

## 2018-03-30 RX ORDER — SODIUM CHLORIDE, SODIUM LACTATE, POTASSIUM CHLORIDE, CALCIUM CHLORIDE 600; 310; 30; 20 MG/100ML; MG/100ML; MG/100ML; MG/100ML
75 INJECTION, SOLUTION INTRAVENOUS CONTINUOUS
Status: DISCONTINUED | OUTPATIENT
Start: 2018-03-30 | End: 2018-03-30 | Stop reason: HOSPADM

## 2018-03-30 RX ORDER — FENTANYL CITRATE 50 UG/ML
100 INJECTION, SOLUTION INTRAMUSCULAR; INTRAVENOUS ONCE
Status: DISCONTINUED | OUTPATIENT
Start: 2018-03-30 | End: 2018-03-30 | Stop reason: HOSPADM

## 2018-03-30 RX ORDER — PROMETHAZINE HYDROCHLORIDE 25 MG/ML
INJECTION, SOLUTION INTRAMUSCULAR; INTRAVENOUS AS NEEDED
Status: DISCONTINUED | OUTPATIENT
Start: 2018-03-30 | End: 2018-03-30 | Stop reason: HOSPADM

## 2018-03-30 RX ORDER — MIDAZOLAM HYDROCHLORIDE 1 MG/ML
2 INJECTION, SOLUTION INTRAMUSCULAR; INTRAVENOUS ONCE
Status: DISCONTINUED | OUTPATIENT
Start: 2018-03-30 | End: 2018-03-30 | Stop reason: HOSPADM

## 2018-03-30 RX ORDER — MIDAZOLAM HYDROCHLORIDE 1 MG/ML
2 INJECTION, SOLUTION INTRAMUSCULAR; INTRAVENOUS ONCE
Status: COMPLETED | OUTPATIENT
Start: 2018-03-30 | End: 2018-03-30

## 2018-03-30 RX ORDER — HYDROMORPHONE HYDROCHLORIDE 2 MG/ML
0.5 INJECTION, SOLUTION INTRAMUSCULAR; INTRAVENOUS; SUBCUTANEOUS
Status: DISCONTINUED | OUTPATIENT
Start: 2018-03-30 | End: 2018-03-30 | Stop reason: HOSPADM

## 2018-03-30 RX ORDER — LIDOCAINE HYDROCHLORIDE 10 MG/ML
0.1 INJECTION INFILTRATION; PERINEURAL AS NEEDED
Status: DISCONTINUED | OUTPATIENT
Start: 2018-03-30 | End: 2018-03-30 | Stop reason: HOSPADM

## 2018-03-30 RX ORDER — APREPITANT 40 MG/1
40 CAPSULE ORAL ONCE
Status: COMPLETED | OUTPATIENT
Start: 2018-03-30 | End: 2018-03-30

## 2018-03-30 RX ORDER — PROPOFOL 10 MG/ML
INJECTION, EMULSION INTRAVENOUS AS NEEDED
Status: DISCONTINUED | OUTPATIENT
Start: 2018-03-30 | End: 2018-03-30 | Stop reason: HOSPADM

## 2018-03-30 RX ORDER — ROPIVACAINE HYDROCHLORIDE 5 MG/ML
INJECTION, SOLUTION EPIDURAL; INFILTRATION; PERINEURAL AS NEEDED
Status: DISCONTINUED | OUTPATIENT
Start: 2018-03-30 | End: 2018-03-30 | Stop reason: HOSPADM

## 2018-03-30 RX ORDER — DEXAMETHASONE SODIUM PHOSPHATE 4 MG/ML
INJECTION, SOLUTION INTRA-ARTICULAR; INTRALESIONAL; INTRAMUSCULAR; INTRAVENOUS; SOFT TISSUE AS NEEDED
Status: DISCONTINUED | OUTPATIENT
Start: 2018-03-30 | End: 2018-03-30 | Stop reason: HOSPADM

## 2018-03-30 RX ORDER — OXYCODONE HYDROCHLORIDE 5 MG/1
5 TABLET ORAL
Status: DISCONTINUED | OUTPATIENT
Start: 2018-03-30 | End: 2018-03-30 | Stop reason: HOSPADM

## 2018-03-30 RX ORDER — LIDOCAINE HYDROCHLORIDE 20 MG/ML
INJECTION, SOLUTION EPIDURAL; INFILTRATION; INTRACAUDAL; PERINEURAL AS NEEDED
Status: DISCONTINUED | OUTPATIENT
Start: 2018-03-30 | End: 2018-03-30 | Stop reason: HOSPADM

## 2018-03-30 RX ORDER — FAMOTIDINE 20 MG/1
20 TABLET, FILM COATED ORAL ONCE
Status: COMPLETED | OUTPATIENT
Start: 2018-03-30 | End: 2018-03-30

## 2018-03-30 RX ORDER — APREPITANT 40 MG/1
80 CAPSULE ORAL ONCE
Status: DISCONTINUED | OUTPATIENT
Start: 2018-03-30 | End: 2018-03-30

## 2018-03-30 RX ORDER — FENTANYL CITRATE 50 UG/ML
INJECTION, SOLUTION INTRAMUSCULAR; INTRAVENOUS AS NEEDED
Status: DISCONTINUED | OUTPATIENT
Start: 2018-03-30 | End: 2018-03-30 | Stop reason: HOSPADM

## 2018-03-30 RX ORDER — OXYCODONE HYDROCHLORIDE 5 MG/1
10 TABLET ORAL
Status: DISCONTINUED | OUTPATIENT
Start: 2018-03-30 | End: 2018-03-30 | Stop reason: HOSPADM

## 2018-03-30 RX ORDER — ONDANSETRON 2 MG/ML
INJECTION INTRAMUSCULAR; INTRAVENOUS AS NEEDED
Status: DISCONTINUED | OUTPATIENT
Start: 2018-03-30 | End: 2018-03-30 | Stop reason: HOSPADM

## 2018-03-30 RX ADMIN — APREPITANT 40 MG: 40 CAPSULE ORAL at 07:19

## 2018-03-30 RX ADMIN — MIDAZOLAM HYDROCHLORIDE 2 MG: 1 INJECTION, SOLUTION INTRAMUSCULAR; INTRAVENOUS at 07:20

## 2018-03-30 RX ADMIN — LIDOCAINE HYDROCHLORIDE 60 MG: 20 INJECTION, SOLUTION EPIDURAL; INFILTRATION; INTRACAUDAL; PERINEURAL at 08:12

## 2018-03-30 RX ADMIN — FAMOTIDINE 20 MG: 20 TABLET, FILM COATED ORAL at 07:17

## 2018-03-30 RX ADMIN — PROPOFOL 20 MG: 10 INJECTION, EMULSION INTRAVENOUS at 08:31

## 2018-03-30 RX ADMIN — FENTANYL CITRATE 100 MCG: 50 INJECTION, SOLUTION INTRAMUSCULAR; INTRAVENOUS at 08:16

## 2018-03-30 RX ADMIN — SODIUM CHLORIDE, SODIUM LACTATE, POTASSIUM CHLORIDE, AND CALCIUM CHLORIDE 75 ML/HR: 600; 310; 30; 20 INJECTION, SOLUTION INTRAVENOUS at 07:17

## 2018-03-30 RX ADMIN — PROMETHAZINE HYDROCHLORIDE 6.25 MG: 25 INJECTION, SOLUTION INTRAMUSCULAR; INTRAVENOUS at 08:28

## 2018-03-30 RX ADMIN — DEXAMETHASONE SODIUM PHOSPHATE 10 MG: 4 INJECTION, SOLUTION INTRA-ARTICULAR; INTRALESIONAL; INTRAMUSCULAR; INTRAVENOUS; SOFT TISSUE at 08:16

## 2018-03-30 RX ADMIN — PROPOFOL 200 MG: 10 INJECTION, EMULSION INTRAVENOUS at 08:12

## 2018-03-30 RX ADMIN — ONDANSETRON 4 MG: 2 INJECTION INTRAMUSCULAR; INTRAVENOUS at 08:30

## 2018-03-30 RX ADMIN — FENTANYL CITRATE 50 MCG: 50 INJECTION, SOLUTION INTRAMUSCULAR; INTRAVENOUS at 08:24

## 2018-03-30 NOTE — IP AVS SNAPSHOT
[address] 
[phone] Patient: Mili Fields MRN: [id number] OTK:[id number] A check chema indicates which time of day the medication should be taken. My Medications ASK your doctor about these medications Instructions Each Dose to Equal  
 Morning Noon Evening Bedtime CeleBREX 200 mg capsule Generic drug:  celecoxib Your last dose was: Your next dose is: Take 200 mg by mouth two (2) times a day. 200 mg  
    
   
   
   
  
 estrogen (conjugated)-medroxyPROGESTERone 0.625 mg (14)/ 0.625mg-5mg(14) per tablet Commonly known as:  PREMPHASE Your last dose was: Your next dose is:    
   
   
 1 po qd  
     
   
   
   
  
 fluticasone 50 mcg/actuation nasal spray Commonly known as:  Imelda Davis Your last dose was: Your next dose is: 2 Sprays by Both Nostrils route daily. 2 [place] HYDROcodone-acetaminophen 5-325 mg per tablet Commonly known as:  Jeremias Mcacrtney Ask about: Should I take this medication? Your last dose was: Your next dose is: Take 1 Tab by mouth every six (6) hours as needed for Pain for up to 30 days. Max Daily Amount: 4 Tabs. Indications: Pain 1 Tab  
    
   
   
   
  
 levocetirizine 5 mg tablet Commonly known as:  Yusrajose Hussein Your last dose was: Your next dose is: TAKE 1 TABLET BY MOUTH EVERY DAY  
     
   
   
   
  
 pseudoephedrine  mg Tb24 tablet Commonly known as:  SUDAFED 24 HOUR Your last dose was: Your next dose is: Take 1 Tab by mouth every twenty-four (24) hours.   
 240 mg

## 2018-03-30 NOTE — OP NOTES
[facility] REPORT    Carmen Morris  MR#: [id number]  : 1960  ACCOUNT #: [de-identified]   DATE OF SERVICE: 2018    PREOPERATIVE DIAGNOSIS:  Left medial meniscus tear. POSTOPERATIVE DIAGNOSES  1. Tear of the left medial meniscus posterior horn. 2.  Flap tear of the lateral meniscus. 3.  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 PROCEDURES PERFORMED  1. Arthroscopic medial and lateral meniscectomy. 2.  Abrasion arthroplasty of the femoral sulcus of the patellofemoral joint. 3.  Chondroplasty of the medial femoral condyle. SURGEON:  Vicki Keyes MD.     ANESTHESIA:  General.    BONE LOSS:  Minimal.     ESTIMATED BLOOD LOSS:  None. SPECIMENS REMOVED:  None. COMPLICATIONS:  None. ASSISTANT:  None. IMPLANTS:  None. PROCEDURE:  After an adequate level of general anesthesia was obtained, the left leg was prepped and draped in the usual sterile fashion. Tourniquet was used for the procedure. Photos made for the patient. Anteromedial and anterolateral portals were made. Findings revealed normal tracking of the patella; however, there is some grade II and small areas of grade III chondromalacia on the femoral sulcus and a chondroplasty with abrasion arthroplasty of the small area of the eburnated bone was performed. ACL, PCL intact. The lateral compartment articular surfaces looked fine. Small flap tear of the middle third of the lateral menisci resected with a basket and shaver. In the medial compartment, there was a tear of the posterior horn of the medial meniscus resected with the basket and the shaver. There was some mild chondromalacia medially and a chondroplasty was performed. The knee was then copiously irrigated. Sterile dressing was applied.       MD Jean Rahman / Apurva Del Cid  D: 2018 08:41     T: 2018 08:53  JOB #: [id number]  CC: [facility]

## 2018-03-30 NOTE — IP AVS SNAPSHOT
[address] 
[phone] Patient: Margie Jauregui MRN: [id number] ILB:[id number] About your hospitalization You were admitted on:  March 30, 2018 You last received care in the:  [facility] OP PACU You were discharged on:  March 30, 2018 Why you were hospitalized Your primary diagnosis was:  Not on File Follow-up Information Follow up With Details Comments Contact Info Bernetta Duane, MD   [address] [place] [id number] [phone] Discharge Orders None A check chema indicates which time of day the medication should be taken. My Medications ASK your doctor about these medications Instructions Each Dose to Equal  
 Morning Noon Evening Bedtime CeleBREX 200 mg capsule Generic drug:  celecoxib Your last dose was: Your next dose is: Take 200 mg by mouth two (2) times a day. 200 mg  
    
   
   
   
  
 estrogen (conjugated)-medroxyPROGESTERone 0.625 mg (14)/ 0.625mg-5mg(14) per tablet Commonly known as:  PREMPHASE Your last dose was: Your next dose is:    
   
   
 1 po qd  
     
   
   
   
  
 fluticasone 50 mcg/actuation nasal spray Commonly known as:  [place] Oats Your last dose was: Your next dose is: 2 Sprays by Both Nostrils route daily. 2 [place] HYDROcodone-acetaminophen 5-325 mg per tablet Commonly known as:  Roslyn Boast Ask about: Should I take this medication? Your last dose was: Your next dose is: Take 1 Tab by mouth every six (6) hours as needed for Pain for up to 30 days. Max Daily Amount: 4 Tabs. Indications: Pain 1 Tab  
    
   
   
   
  
 levocetirizine 5 mg tablet Commonly known as:  Harriet Holm Your last dose was: Your next dose is:  TAKE 1 TABLET BY MOUTH EVERY DAY  
     
   
   
   
  
 pseudoephedrine  mg Tb24 tablet Commonly known as:  SUDAFED 24 HOUR Your last dose was: Your next dose is: Take 1 Tab by mouth every twenty-four (24) hours. 240 mg Discharge Instructions POST OPERATIVE INSTRUCTIONS FOR KNEE ARTHROSCOPY 1. Unless you receive other instructions, you may weight bear as tolerated 2. Apply ice to your knee for 20-30 minutes several times per day for the first 3 days and then as needed. Icing will decrease the amount of inflammation and is helpful after exercise 3. You may remove the dressings the following day and apply waterproof band aids. Some bleeding and drainage may persist for several days following  surgery. Waterproof band aids may be used while showering and avoid tub baths for two weeks. 4. You will be given pain medications and do not drive under the influence. Some patients take pain medication at night and antiinflammatory medication during day  when pain decreases. 5. You may be given Phenergan for nausea 6. You will receive a phone call from our office notifying you of your follow up visit 7. If any problems call [phone] DIET · Clear liquids until no nausea or vomiting; then light diet for the first day. · Advance to regular diet on second day, unless your doctor orders otherwise. · If nausea and vomiting continues, call your doctor. · Avoid greasy and spicy food today to reduce nausea. PAIN 
· Take pain medication as directed by your doctor. · Call your doctor if pain is NOT relieved by medication. · DO NOT take aspirin of blood thinners unless directed by your doctor. · Take pain pills with food to reduce nausea · Pain pills may cause constipation. May use stool softener CALL YOUR DOCTOR IF  
· Excessive bleeding that does not stop after holding pressure over the area · Temperature of 101 degrees F or above · Excessive redness, swelling or bruising, and/ or green or yellow, smelly discharge from incision AFTER ANESTHESIA · For the first 24 hours: DO NOT Drive, Drink alcoholic beverages, or Make important decisions. · Be aware of dizziness following anesthesia and while taking pain medication. APPOINTMENT DATE/ TIME: Office will call you to schedule a follow up. YOUR DOCTOR'S PHONE NUMBER [phone] DISCHARGE SUMMARY from Nurse PATIENT INSTRUCTIONS: 
 
After general anesthesia or intravenous sedation, for 24 hours or while taking prescription Narcotics: · Limit your activities · Do not drive and operate hazardous machinery · Do not make important personal or business decisions · Do  not drink alcoholic beverages · If you have not urinated within 8 hours after discharge, please contact your surgeon on call. *  Please give a list of your current medications to your Primary Care Provider. *  Please update this list whenever your medications are discontinued, doses are 
    changed, or new medications (including over-the-counter products) are added. *  Please carry medication information at all times in case of emergency situations. These are general instructions for a healthy lifestyle: No smoking/ No tobacco products/ Avoid exposure to second hand smoke Surgeon General's Warning:  Quitting smoking now greatly reduces serious risk to your health. Obesity, smoking, and sedentary lifestyle greatly increases your risk for illness A healthy diet, regular physical exercise & weight monitoring are important for maintaining a healthy lifestyle You may be retaining fluid if you have a history of heart failure or if you experience any of the following symptoms:  Weight gain of 3 pounds or more overnight or 5 pounds in a week, increased swelling in our hands or feet or shortness of breath while lying flat in bed.   Please call your doctor as soon as you notice any of these symptoms; do not wait until your next office visit. Recognize signs and symptoms of STROKE: 
 
F-face looks uneven A-arms unable to move or move unevenly S-speech slurred or non-existent T-time-call 911 as soon as signs and symptoms begin-DO NOT go Back to bed or wait to see if you get better-TIME IS BRAIN. Introducing [facility] [place] [facility]! Dear Kelsea Yoon: 
Thank you for requesting a Memeoirs account. Our records indicate that you already have an active Memeoirs account. You can access your account anytime at [email or web address] Did you know that you can access your hospital and ER discharge instructions at any time in Memeoirs? You can also review all of your test results from your hospital stay or ER visit. Additional Information If you have questions, please visit the Frequently Asked Questions section of the Memeoirs website at [email or web address] Remember, Memeoirs is NOT to be used for urgent needs. For medical emergencies, dial 911. Now available from your iPhone and Android! Introducing Doni Seaman As a [facility] patient, I wanted to make you aware of our electronic visit tool called Doni Woodyfin. [facility] 24/7 allows you to connect within minutes with a medical provider 24 hours a day, seven days a week via a mobile device or tablet or logging into a secure website from your computer. You can access Doni Michaeljessicafin from anywhere in the [place]. A virtual visit might be right for you when you have a simple condition and feel like you just dont want to get out of bed, or cant get away from work for an appointment, when your regular [facility] provider is not available (evenings, weekends or holidays), or when youre out of town and need minor care.   Electronic visits cost only $49 and if the [facility] 24/7 provider determines a prescription is needed to treat your condition, one can be electronically transmitted to a nearby pharmacy*. Please take a moment to enroll today if you have not already done so. The enrollment process is free and takes just a few minutes. To enroll, please download the [employer] 24/7 xavier to your tablet or phone, or visit [email or web address] to enroll on your computer. And, as an [address] [place] [address] patient with a [employer] account, the results of your visits will be scanned into your electronic medical record and your primary care provider will be able to view the scanned results. We urge you to continue to see your regular [employer] provider for your ongoing medical care. And while your primary care provider may not be the one available when you seek a [employer] virtual visit, the peace of mind you get from getting a real diagnosis real time can be priceless. For more information on [employer] view our Frequently Asked Questions (FAQs) at [email or web address]. Sincerely, 
 
Jerl Galeazzi, MD 
Chief Medical Officer [facility] Sasha Orona *:  certain medications cannot be prescribed via [employer] Providers Seen During Your Hospitalization Provider Specialty Primary office phone Brenna Junior MD Orthopedic Surgery [phone] Your Primary Care Physician (PCP) Primary Care Physician Office Phone Office Fax Collette Barlow [phone] You are allergic to the following Allergen Reactions Codeine Nausea and Vomiting Pt states \"a lot of pain medications maker her sick\" Penicillins Nausea and Vomiting Pt states possible allergy to PCN Recent Documentation Weight BMI OB Status Smoking Status 125.6 kg 42.12 kg/m2 Postmenopausal Never Smoker Emergency Contacts Name Discharge Info Relation Home Work Mobile Zain Madrid  Friend [1] [phone] Patient Belongings The following personal items are in your possession at time of discharge: 
  Dental Appliances: None  Visual Aid: None      Home Medications: None   Jewelry: None  Clothing: Undergarments, Pants, Shirt    Other Valuables: None Please provide this summary of care documentation to your next provider. Signatures-by signing, you are acknowledging that this After Visit Summary has been reviewed with you and you have received a copy. Patient Signature:  ____________________________________________________________ Date:  ____________________________________________________________  
  
[facility] Provider Signature:  ____________________________________________________________ Date:  ____________________________________________________________

## 2018-03-30 NOTE — DISCHARGE INSTRUCTIONS
POST OPERATIVE INSTRUCTIONS FOR KNEE ARTHROSCOPY    1. Unless you receive other instructions, you may weight bear as tolerated      2. Apply ice to your knee for 20-30 minutes several times per day for the first 3 days and then as needed. Icing will decrease the amount of inflammation and is helpful after exercise    3. You may remove the dressings the following day and apply waterproof band aids. Some bleeding and drainage may persist for several days following  surgery. Waterproof band aids may be used while showering and avoid tub baths for two weeks. 4. You will be given pain medications and do not drive under the influence. Some patients take pain medication at night and antiinflammatory medication during day  when pain decreases. 5. You may be given Phenergan for nausea    6. You will receive a phone call from our office notifying you of your follow up visit    7. If any problems call [phone]        DIET  · Clear liquids until no nausea or vomiting; then light diet for the first day. · Advance to regular diet on second day, unless your doctor orders otherwise. · If nausea and vomiting continues, call your doctor. · Avoid greasy and spicy food today to reduce nausea. PAIN  · Take pain medication as directed by your doctor. · Call your doctor if pain is NOT relieved by medication. · DO NOT take aspirin of blood thinners unless directed by your doctor. · Take pain pills with food to reduce nausea  · Pain pills may cause constipation. May use stool softener    CALL YOUR DOCTOR IF   · Excessive bleeding that does not stop after holding pressure over the area  · Temperature of 101 degrees F or above  · Excessive redness, swelling or bruising, and/ or green or yellow, smelly discharge from incision    AFTER ANESTHESIA   · For the first 24 hours: DO NOT Drive, Drink alcoholic beverages, or Make important decisions.    · Be aware of dizziness following anesthesia and while taking pain medication. APPOINTMENT DATE/ TIME: Office will call you to schedule a follow up. YOUR DOCTOR'S PHONE NUMBER [phone]      DISCHARGE SUMMARY from Nurse    PATIENT INSTRUCTIONS:    After general anesthesia or intravenous sedation, for 24 hours or while taking prescription Narcotics:  · Limit your activities  · Do not drive and operate hazardous machinery  · Do not make important personal or business decisions  · Do  not drink alcoholic beverages  · If you have not urinated within 8 hours after discharge, please contact your surgeon on call. *  Please give a list of your current medications to your Primary Care Provider. *  Please update this list whenever your medications are discontinued, doses are      changed, or new medications (including over-the-counter products) are added. *  Please carry medication information at all times in case of emergency situations. These are general instructions for a healthy lifestyle:    No smoking/ No tobacco products/ Avoid exposure to second hand smoke    Surgeon General's Warning:  Quitting smoking now greatly reduces serious risk to your health. Obesity, smoking, and sedentary lifestyle greatly increases your risk for illness    A healthy diet, regular physical exercise & weight monitoring are important for maintaining a healthy lifestyle    You may be retaining fluid if you have a history of heart failure or if you experience any of the following symptoms:  Weight gain of 3 pounds or more overnight or 5 pounds in a week, increased swelling in our hands or feet or shortness of breath while lying flat in bed. Please call your doctor as soon as you notice any of these symptoms; do not wait until your next office visit.     Recognize signs and symptoms of STROKE:    F-face looks uneven    A-arms unable to move or move unevenly    S-speech slurred or non-existent    T-time-call 911 as soon as signs and symptoms begin-DO NOT go       Back to bed or wait to see if you get better-TIME IS BRAIN.

## 2018-03-30 NOTE — H&P
Outpatient Surgery History and Physical:  Garry Sosa was seen and examined. CHIEF COMPLAINT:    l knee . PE:     Visit Vitals    BP (!) 174/96 (BP 1 Location: Right arm, BP Patient Position: Sitting)    Pulse 95    Temp 99 °F (37.2 °C)    Resp 20    Wt 125.6 kg (277 lb)    SpO2 95%    BMI 42.12 kg/m2       Heart:   Regular rhythm      Lungs:  Are clear      Past Medical History:    Patient Active Problem List    Diagnosis    Anterior soft tissue impingement    Allergic rhinitis    Chronic pain    Morbid obesity ([address]       Surgical History:   Past Surgical History:   Procedure Laterality Date    FRAGMENT KIDNEY STONE/ ESWL      HX CHOLECYSTECTOMY  1981    HX KNEE ARTHROSCOPY Right     HX ORTHOPAEDIC Right     wrist    HX OTHER SURGICAL  2011    cauterization of nerves to left leg    HX ROTATOR CUFF REPAIR Right 2015    SINUS SURGERY PROC UNLISTED      Dr. Rosangela Ugarte       Social History: Patient  reports that she has never smoked. She has never used smokeless tobacco. She reports that she does not drink alcohol or use illicit drugs. Family History:   Family History   Problem Relation Age of Onset    Hypertension Mother     Diabetes Father     Diabetes Brother     Diabetes Brother     Diabetes Brother     Breast Cancer Neg Hx        Allergies: Reviewed per EMR  Allergies   Allergen Reactions    Codeine Nausea and Vomiting     Pt states \"a lot of pain medications maker her sick\"    Penicillins Nausea and Vomiting     Pt states possible allergy to PCN       Medications:    No current facility-administered medications on file prior to encounter. Current Outpatient Prescriptions on File Prior to Encounter   Medication Sig    [] HYDROcodone-acetaminophen (NORCO) 5-325 mg per tablet Take 1 Tab by mouth every six (6) hours as needed for Pain for up to 30 days. Max Daily Amount: 4 Tabs.  Indications: Pain    fluticasone (FLONASE) 50 mcg/actuation nasal spray 2 Sprays by Both Nostrils route daily.  estrogen, conjugated,-medroxyPROGESTERone (PREMPHASE) 0.625 mg (14)/ 0.625mg-5mg(14) per tablet 1 po qd    levocetirizine (XYZAL) 5 mg tablet TAKE 1 TABLET BY MOUTH EVERY DAY (Patient taking differently: daily. TAKE 1 TABLET BY MOUTH EVERY DAY  Indications: Allergic Rhinitis)    pseudoephedrine ER (SUDAFED 24 HOUR) 240 mg Tb24 tablet Take 1 Tab by mouth every twenty-four (24) hours. The surgery is planned for the  knee. History and physical has been reviewed. The patient has been examined. There have been no significant clinical changes since the completion of the originally dated History and Physical.  Patient identified by surgeon; surgical site was confirmed by patient and surgeon. The patient is here today for outpatient surgery. I have examined the patient, no changes are noted in the patient's medical status. Necessity for the procedure/care is still present and the history and physical above is current. See the office notes for the full long term history of the problem. Please see the recent office notes for the musculoskeletal examination.     Signed By: Angélica Long MD     March 30, 2018 7:07 AM

## 2018-03-30 NOTE — ANESTHESIA PREPROCEDURE EVALUATION
Anesthetic History     PONV          Review of Systems / Medical History  Patient summary reviewed and pertinent labs reviewed    Pulmonary  Within defined limits                 Neuro/Psych         Headaches     Cardiovascular                  Exercise tolerance: >4 METS     GI/Hepatic/Renal  Within defined limits              Endo/Other        Morbid obesity and arthritis     Other Findings   Comments: Chronic back pain           Physical Exam    Airway  Mallampati: III  TM Distance: 4 - 6 cm  Neck ROM: normal range of motion   Mouth opening: Normal     Cardiovascular    Rhythm: regular  Rate: normal         Dental    Dentition: Caps/crowns     Pulmonary  Breath sounds clear to auscultation               Abdominal  GI exam deferred       Other Findings            Anesthetic Plan    ASA: 3  Anesthesia type: general          Induction: Intravenous  Anesthetic plan and risks discussed with: Patient

## 2018-03-30 NOTE — BRIEF OP NOTE
BRIEF OPERATIVE NOTE    Date of Procedure: 3/30/2018   Preoperative Diagnosis: Complex tear of medial meniscus of left knee as current injury, initial encounter [S83.232A]  Primary osteoarthritis of left knee [M17.12]  Postoperative Diagnosis: Left Knee Medial Meniscus Tear    Procedure(s):  LEFT KNEE ARTHROSCOPY / MEDIAL MENISCECTOMY   Surgeon(s) and Role:     * Curtis Mcmanus MD - Primary         Assistant Staff: None      Surgical Staff:  Circ-1: Laura Peraza RN  Scrub Tech-1: Umu Trevino  Event Time In   Incision Start 6299   Incision Close 0750     Anesthesia: General   Estimated Blood Loss:     Specimens: * No specimens in log *   Findings:      Complications:       Implants: * No implants in log *

## 2018-03-30 NOTE — ANESTHESIA POSTPROCEDURE EVALUATION
Post-Anesthesia Evaluation and Assessment    Patient: Priscila Rowland MRN: [id number]  SSN: [id number]    YOB: 1960  Age: 62 y.o. Sex: female       Cardiovascular Function/Vital Signs  Visit Vitals    /78    Pulse 89    Temp 36.6 °C (97.9 °F)    Resp 12    Wt 125.6 kg (277 lb)    SpO2 98%    BMI 42.12 kg/m2       Patient is status post general anesthesia for Procedure(s):  LEFT KNEE ARTHROSCOPY / MEDIAL MENISCECTOMY . Nausea/Vomiting: None    Postoperative hydration reviewed and adequate. Pain:  Pain Scale 1: Numeric (0 - 10) (03/30/18 0931)  Pain Intensity 1: 0 (03/30/18 0931)   Managed    Neurological Status:   Neuro (WDL): Within Defined Limits (03/30/18 0931)  Neuro  Neurologic State: Alert (03/30/18 0931)  LLE Motor Response: Numbness (03/30/18 0931)   At baseline    Mental Status and Level of Consciousness: Arousable    Pulmonary Status:   O2 Device: Room air (03/30/18 0931)   Adequate oxygenation and airway patent    Complications related to anesthesia: None    Post-anesthesia assessment completed.  No concerns    Signed By: Lito Moffett MD     March 30, 2018

## 2019-05-14 RX ORDER — SODIUM CHLORIDE 0.9 % (FLUSH) 0.9 %
5-40 SYRINGE (ML) INJECTION EVERY 8 HOURS
Status: CANCELLED | OUTPATIENT
Start: 2019-05-14

## 2019-05-14 RX ORDER — SODIUM CHLORIDE 0.9 % (FLUSH) 0.9 %
5-40 SYRINGE (ML) INJECTION AS NEEDED
Status: CANCELLED | OUTPATIENT
Start: 2019-05-14
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
## 2019-05-15 ENCOUNTER — HOSPITAL ENCOUNTER (OUTPATIENT)
Age: 59
Setting detail: OUTPATIENT SURGERY
Discharge: HOME | End: 2019-05-15
Attending: ORTHOPAEDIC SURGERY | Admitting: ORTHOPAEDIC SURGERY
Payer: COMMERCIAL

## 2019-05-15 ENCOUNTER — ANESTHESIA (OUTPATIENT)
Dept: SURGERY | Age: 59
End: 2019-05-15
Payer: COMMERCIAL

## 2019-05-15 ENCOUNTER — ANESTHESIA EVENT (OUTPATIENT)
Dept: SURGERY | Age: 59
End: 2019-05-15
Payer: COMMERCIAL

## 2019-05-15 VITALS
TEMPERATURE: 98 F | HEART RATE: 82 BPM | DIASTOLIC BLOOD PRESSURE: 75 MMHG | SYSTOLIC BLOOD PRESSURE: 105 MMHG | BODY MASS INDEX: 43.7 KG/M2 | WEIGHT: 279 LBS | RESPIRATION RATE: 16 BRPM | OXYGEN SATURATION: 95 %

## 2019-05-15 PROCEDURE — 76942 ECHO GUIDE FOR BIOPSY: CPT | Performed by: ORTHOPAEDIC SURGERY

## 2019-05-15 PROCEDURE — 77030008703 HC TU ET UNCUF COVD -A: Performed by: ANESTHESIOLOGY

## 2019-05-15 PROCEDURE — 77030002916 HC SUT ETHLN J&J -A: Performed by: ORTHOPAEDIC SURGERY

## 2019-05-15 PROCEDURE — 74011000250 HC RX REV CODE- 250

## 2019-05-15 PROCEDURE — 74011250636 HC RX REV CODE- 250/636: Performed by: ORTHOPAEDIC SURGERY

## 2019-05-15 PROCEDURE — 77030002933 HC SUT MCRYL J&J -A: Performed by: ORTHOPAEDIC SURGERY

## 2019-05-15 PROCEDURE — 77030039425 HC BLD LARYNG TRULITE DISP TELE -A: Performed by: ANESTHESIOLOGY

## 2019-05-15 PROCEDURE — 74011250636 HC RX REV CODE- 250/636: Performed by: ANESTHESIOLOGY

## 2019-05-15 PROCEDURE — 74011250637 HC RX REV CODE- 250/637: Performed by: ANESTHESIOLOGY

## 2019-05-15 PROCEDURE — 77030002966 HC SUT PDS J&J -A: Performed by: ORTHOPAEDIC SURGERY

## 2019-05-15 PROCEDURE — C1713 ANCHOR/SCREW BN/BN,TIS/BN: HCPCS | Performed by: ORTHOPAEDIC SURGERY

## 2019-05-15 PROCEDURE — 77030006788 HC BLD SAW OSC STRY -B: Performed by: ORTHOPAEDIC SURGERY

## 2019-05-15 PROCEDURE — 76010010054 HC POST OP PAIN BLOCK: Performed by: ORTHOPAEDIC SURGERY

## 2019-05-15 PROCEDURE — 77030021122 HC SPLNT MAT FST BSNM -A: Performed by: ORTHOPAEDIC SURGERY

## 2019-05-15 PROCEDURE — 77030020052 HC SUT PASS DISP S&N -B: Performed by: ORTHOPAEDIC SURGERY

## 2019-05-15 PROCEDURE — 74011250636 HC RX REV CODE- 250/636: Performed by: PHYSICIAN ASSISTANT

## 2019-05-15 PROCEDURE — 77030018836 HC SOL IRR NACL ICUM -A: Performed by: ORTHOPAEDIC SURGERY

## 2019-05-15 PROCEDURE — 76210000063 HC OR PH I REC FIRST 0.5 HR: Performed by: ORTHOPAEDIC SURGERY

## 2019-05-15 PROCEDURE — 74011250636 HC RX REV CODE- 250/636

## 2019-05-15 PROCEDURE — 77030020797 HC BIT DRL DISP SN -C: Performed by: ORTHOPAEDIC SURGERY

## 2019-05-15 PROCEDURE — 76210000020 HC REC RM PH II FIRST 0.5 HR: Performed by: ORTHOPAEDIC SURGERY

## 2019-05-15 PROCEDURE — 77030003602 HC NDL NRV BLK BBMI -B: Performed by: ANESTHESIOLOGY

## 2019-05-15 PROCEDURE — 76060000034 HC ANESTHESIA 1.5 TO 2 HR: Performed by: ORTHOPAEDIC SURGERY

## 2019-05-15 PROCEDURE — 76010000162 HC OR TIME 1.5 TO 2 HR INTENSV-TIER 1: Performed by: ORTHOPAEDIC SURGERY

## 2019-05-15 PROCEDURE — 77030013921: Performed by: ORTHOPAEDIC SURGERY

## 2019-05-15 PROCEDURE — 74011000258 HC RX REV CODE- 258: Performed by: ORTHOPAEDIC SURGERY

## 2019-05-15 DEVICE — TWINFIX ULTRA 4.5 MM POLY L LACTIC                                    ACID-HA SUTURE ANCHOR WITH TWO NO.2                                    ULTRABRAID SUTURES BLUE,                                    BLUE-COBRAID WITH NEEDLES
Type: IMPLANTABLE DEVICE | Site: FOOT | Status: FUNCTIONAL
Brand: TWINFIX

## 2019-05-15 DEVICE — FOOTPRINT ULTRA PK SUTURE ANCHOR 4.5
Type: IMPLANTABLE DEVICE | Site: FOOT | Status: FUNCTIONAL
Brand: FOOTPRINT

## 2019-05-15 RX ORDER — SODIUM CHLORIDE 0.9 % (FLUSH) 0.9 %
5-40 SYRINGE (ML) INJECTION EVERY 8 HOURS
Status: DISCONTINUED | OUTPATIENT
Start: 2019-05-15 | End: 2019-05-15 | Stop reason: HOSPADM

## 2019-05-15 RX ORDER — SCOLOPAMINE TRANSDERMAL SYSTEM 1 MG/1
1 PATCH, EXTENDED RELEASE TRANSDERMAL ONCE
Status: COMPLETED | OUTPATIENT
Start: 2019-05-15 | End: 2019-05-18

## 2019-05-15 RX ORDER — CLINDAMYCIN PHOSPHATE 900 MG/50ML
900 INJECTION INTRAVENOUS ONCE
Status: COMPLETED | OUTPATIENT
Start: 2019-05-15 | End: 2019-05-15

## 2019-05-15 RX ORDER — SODIUM CHLORIDE, SODIUM LACTATE, POTASSIUM CHLORIDE, CALCIUM CHLORIDE 600; 310; 30; 20 MG/100ML; MG/100ML; MG/100ML; MG/100ML
125 INJECTION, SOLUTION INTRAVENOUS CONTINUOUS
Status: DISCONTINUED | OUTPATIENT
Start: 2019-05-15 | End: 2019-05-15 | Stop reason: HOSPADM

## 2019-05-15 RX ORDER — SUCCINYLCHOLINE CHLORIDE 20 MG/ML
INJECTION INTRAMUSCULAR; INTRAVENOUS AS NEEDED
Status: DISCONTINUED | OUTPATIENT
Start: 2019-05-15 | End: 2019-05-15 | Stop reason: HOSPADM

## 2019-05-15 RX ORDER — SODIUM CHLORIDE 0.9 % (FLUSH) 0.9 %
5-40 SYRINGE (ML) INJECTION AS NEEDED
Status: DISCONTINUED | OUTPATIENT
Start: 2019-05-15 | End: 2019-05-15 | Stop reason: HOSPADM

## 2019-05-15 RX ORDER — NEOSTIGMINE METHYLSULFATE 1 MG/ML
INJECTION INTRAVENOUS AS NEEDED
Status: DISCONTINUED | OUTPATIENT
Start: 2019-05-15 | End: 2019-05-15 | Stop reason: HOSPADM

## 2019-05-15 RX ORDER — NALOXONE HYDROCHLORIDE 0.4 MG/ML
0.1 INJECTION, SOLUTION INTRAMUSCULAR; INTRAVENOUS; SUBCUTANEOUS AS NEEDED
Status: DISCONTINUED | OUTPATIENT
Start: 2019-05-15 | End: 2019-05-21 | Stop reason: HOSPADM

## 2019-05-15 RX ORDER — LIDOCAINE HYDROCHLORIDE 20 MG/ML
INJECTION, SOLUTION EPIDURAL; INFILTRATION; INTRACAUDAL; PERINEURAL AS NEEDED
Status: DISCONTINUED | OUTPATIENT
Start: 2019-05-15 | End: 2019-05-15 | Stop reason: HOSPADM

## 2019-05-15 RX ORDER — DEXAMETHASONE SODIUM PHOSPHATE 4 MG/ML
INJECTION, SOLUTION INTRA-ARTICULAR; INTRALESIONAL; INTRAMUSCULAR; INTRAVENOUS; SOFT TISSUE AS NEEDED
Status: DISCONTINUED | OUTPATIENT
Start: 2019-05-15 | End: 2019-05-15 | Stop reason: HOSPADM

## 2019-05-15 RX ORDER — GLYCOPYRROLATE 0.2 MG/ML
INJECTION INTRAMUSCULAR; INTRAVENOUS AS NEEDED
Status: DISCONTINUED | OUTPATIENT
Start: 2019-05-15 | End: 2019-05-15 | Stop reason: HOSPADM

## 2019-05-15 RX ORDER — MIDAZOLAM HYDROCHLORIDE 1 MG/ML
2 INJECTION, SOLUTION INTRAMUSCULAR; INTRAVENOUS ONCE
Status: COMPLETED | OUTPATIENT
Start: 2019-05-15 | End: 2019-05-15

## 2019-05-15 RX ORDER — HYDROMORPHONE HYDROCHLORIDE 2 MG/ML
0.5 INJECTION, SOLUTION INTRAMUSCULAR; INTRAVENOUS; SUBCUTANEOUS
Status: DISCONTINUED | OUTPATIENT
Start: 2019-05-15 | End: 2019-05-21 | Stop reason: HOSPADM

## 2019-05-15 RX ORDER — APREPITANT 40 MG/1
40 CAPSULE ORAL ONCE
Status: COMPLETED | OUTPATIENT
Start: 2019-05-15 | End: 2019-05-15

## 2019-05-15 RX ORDER — ONDANSETRON 2 MG/ML
INJECTION INTRAMUSCULAR; INTRAVENOUS AS NEEDED
Status: DISCONTINUED | OUTPATIENT
Start: 2019-05-15 | End: 2019-05-15 | Stop reason: HOSPADM

## 2019-05-15 RX ORDER — ROCURONIUM BROMIDE 10 MG/ML
INJECTION, SOLUTION INTRAVENOUS AS NEEDED
Status: DISCONTINUED | OUTPATIENT
Start: 2019-05-15 | End: 2019-05-15 | Stop reason: HOSPADM

## 2019-05-15 RX ORDER — BUPIVACAINE HYDROCHLORIDE AND EPINEPHRINE 5; 5 MG/ML; UG/ML
INJECTION, SOLUTION EPIDURAL; INTRACAUDAL; PERINEURAL AS NEEDED
Status: DISCONTINUED | OUTPATIENT
Start: 2019-05-15 | End: 2019-05-15 | Stop reason: HOSPADM

## 2019-05-15 RX ORDER — EPHEDRINE SULFATE 50 MG/ML
INJECTION, SOLUTION INTRAVENOUS AS NEEDED
Status: DISCONTINUED | OUTPATIENT
Start: 2019-05-15 | End: 2019-05-15 | Stop reason: HOSPADM

## 2019-05-15 RX ORDER — PROPOFOL 10 MG/ML
INJECTION, EMULSION INTRAVENOUS AS NEEDED
Status: DISCONTINUED | OUTPATIENT
Start: 2019-05-15 | End: 2019-05-15 | Stop reason: HOSPADM

## 2019-05-15 RX ORDER — FENTANYL CITRATE 50 UG/ML
100 INJECTION, SOLUTION INTRAMUSCULAR; INTRAVENOUS ONCE
Status: COMPLETED | OUTPATIENT
Start: 2019-05-15 | End: 2019-05-15

## 2019-05-15 RX ORDER — APREPITANT 40 MG/1
80 CAPSULE ORAL ONCE
Status: DISCONTINUED | OUTPATIENT
Start: 2019-05-15 | End: 2019-05-15

## 2019-05-15 RX ORDER — OXYCODONE HYDROCHLORIDE 5 MG/1
10 TABLET ORAL
Status: DISCONTINUED | OUTPATIENT
Start: 2019-05-15 | End: 2019-05-16 | Stop reason: HOSPADM

## 2019-05-15 RX ORDER — SODIUM CHLORIDE, SODIUM LACTATE, POTASSIUM CHLORIDE, CALCIUM CHLORIDE 600; 310; 30; 20 MG/100ML; MG/100ML; MG/100ML; MG/100ML
125 INJECTION, SOLUTION INTRAVENOUS CONTINUOUS
Status: DISCONTINUED | OUTPATIENT
Start: 2019-05-15 | End: 2019-05-21 | Stop reason: HOSPADM

## 2019-05-15 RX ORDER — LIDOCAINE HYDROCHLORIDE 10 MG/ML
0.1 INJECTION INFILTRATION; PERINEURAL AS NEEDED
Status: DISCONTINUED | OUTPATIENT
Start: 2019-05-15 | End: 2019-05-15 | Stop reason: HOSPADM

## 2019-05-15 RX ADMIN — FENTANYL CITRATE 100 MCG: 50 INJECTION INTRAMUSCULAR; INTRAVENOUS at 09:09

## 2019-05-15 RX ADMIN — GENTAMICIN SULFATE 420 MG: 40 INJECTION, SOLUTION INTRAMUSCULAR; INTRAVENOUS at 09:00

## 2019-05-15 RX ADMIN — ROCURONIUM BROMIDE 10 MG: 10 INJECTION, SOLUTION INTRAVENOUS at 09:57

## 2019-05-15 RX ADMIN — SODIUM CHLORIDE, SODIUM LACTATE, POTASSIUM CHLORIDE, AND CALCIUM CHLORIDE 125 ML/HR: 600; 310; 30; 20 INJECTION, SOLUTION INTRAVENOUS at 08:05

## 2019-05-15 RX ADMIN — DEXAMETHASONE SODIUM PHOSPHATE 4 MG: 4 INJECTION, SOLUTION INTRA-ARTICULAR; INTRALESIONAL; INTRAMUSCULAR; INTRAVENOUS; SOFT TISSUE at 10:38

## 2019-05-15 RX ADMIN — SODIUM CHLORIDE, SODIUM LACTATE, POTASSIUM CHLORIDE, AND CALCIUM CHLORIDE: 600; 310; 30; 20 INJECTION, SOLUTION INTRAVENOUS at 11:30

## 2019-05-15 RX ADMIN — ONDANSETRON 4 MG: 2 INJECTION INTRAMUSCULAR; INTRAVENOUS at 10:39

## 2019-05-15 RX ADMIN — BUPIVACAINE HYDROCHLORIDE AND EPINEPHRINE 5 ML: 5; 5 INJECTION, SOLUTION EPIDURAL; INTRACAUDAL; PERINEURAL at 09:16

## 2019-05-15 RX ADMIN — APREPITANT 40 MG: 40 CAPSULE ORAL at 09:01

## 2019-05-15 RX ADMIN — SUCCINYLCHOLINE CHLORIDE 160 MG: 20 INJECTION INTRAMUSCULAR; INTRAVENOUS at 09:57

## 2019-05-15 RX ADMIN — EPHEDRINE SULFATE 10 MG: 50 INJECTION, SOLUTION INTRAVENOUS at 10:21

## 2019-05-15 RX ADMIN — BUPIVACAINE HYDROCHLORIDE AND EPINEPHRINE 20 ML: 5; 5 INJECTION, SOLUTION EPIDURAL; INTRACAUDAL; PERINEURAL at 09:14

## 2019-05-15 RX ADMIN — ROCURONIUM BROMIDE 20 MG: 10 INJECTION, SOLUTION INTRAVENOUS at 10:08

## 2019-05-15 RX ADMIN — LIDOCAINE HYDROCHLORIDE 80 MG: 20 INJECTION, SOLUTION EPIDURAL; INFILTRATION; INTRACAUDAL; PERINEURAL at 09:57

## 2019-05-15 RX ADMIN — NEOSTIGMINE METHYLSULFATE 3 MG: 1 INJECTION INTRAVENOUS at 11:16

## 2019-05-15 RX ADMIN — PROPOFOL 200 MG: 10 INJECTION, EMULSION INTRAVENOUS at 09:57

## 2019-05-15 RX ADMIN — CLINDAMYCIN PHOSPHATE 900 MG: 900 INJECTION, SOLUTION INTRAVENOUS at 10:06

## 2019-05-15 RX ADMIN — GLYCOPYRROLATE 0.4 MG: 0.2 INJECTION INTRAMUSCULAR; INTRAVENOUS at 11:16

## 2019-05-15 RX ADMIN — MIDAZOLAM HYDROCHLORIDE 2 MG: 2 INJECTION, SOLUTION INTRAMUSCULAR; INTRAVENOUS at 09:09

## 2019-05-15 NOTE — ANESTHESIA PROCEDURE NOTES
Peripheral Block    Start time: 5/15/2019 9:10 AM  End time: 5/15/2019 9:14 AM  Performed by: Oliva Downing MD  Authorized by: Oliva Downing MD       Pre-procedure:    Indications: at surgeon's request and post-op pain management    Preanesthetic Checklist: patient identified, risks and benefits discussed, site marked, timeout performed, anesthesia consent given and patient being monitored    Timeout Time: 09:09          Block Type:   Block Type:  Popliteal  Laterality:  Right  Monitoring:  Responsive to questions, standard ASA monitoring, continuous pulse ox, oxygen, frequent vital sign checks and heart rate  Injection Technique:  Single shot  Procedures: ultrasound guided and nerve stimulator    Patient Position: supine  Prep: chlorhexidine    Location:  Lower thigh  Needle Type:  Stimuplex  Needle Gauge:  22 G  Needle Localization:  Ultrasound guidance and nerve stimulator  Motor Response: minimal motor response >0.4 mA      Assessment:  Number of attempts:  1  Injection Assessment:  Incremental injection every 5 mL, no paresthesia, ultrasound image on chart, no intravascular symptoms, negative aspiration for blood and local visualized surrounding nerve on ultrasound  Patient tolerance:  Patient tolerated the procedure well with no immediate complications

## 2019-05-15 NOTE — H&P
Outpatient Surgery History and Physical: 
Coretta Jiménez CHIEF COMPLAINT:   LE 
 
PE: There were no vitals taken for this visit. Heart:  No noted problems Lungs:  No noted problems Past Medical History:   
Patient Active Problem List  
 Diagnosis  Anterior soft tissue impingement  Allergic rhinitis  Chronic pain  Morbid obesity ([address] Surgical History:  
Past Surgical History:  
Procedure Laterality Date  FRAGMENT KIDNEY STONE/ ESWL    
[address]  HX KNEE ARTHROSCOPY Right 1991  
 HX ORTHOPAEDIC Right 2004  
 wrist  
 HX ORTHOPAEDIC Left 2018  
 torn meniscues tear left knee  HX OTHER SURGICAL  2011  
 cauterization of nerves to left leg  HX ROTATOR CUFF REPAIR Right 11/2015  HX TONSILLECTOMY [address] UNLISTED  2014 Dr. Flaquita Estevez Social History: Patient  reports that she has never smoked. She has never used smokeless tobacco. She reports that she drank alcohol. She reports that she does not use drugs. Family History:  
Family History Problem Relation Age of Onset  Hypertension Mother  Diabetes Father  Diabetes Brother  Diabetes Brother  Diabetes Brother  Breast Cancer Neg Hx Allergies: Reviewed per EMR Allergies Allergen Reactions  Codeine Nausea and Vomiting Pt states \"a lot of pain medications maker her sick\"  Penicillins Nausea and Vomiting Pt states possible allergy to PCN Medications: No current facility-administered medications on file prior to encounter. Current Outpatient Medications on File Prior to Encounter Medication Sig  estradiol (CLIMARA) 0.05 mg/24 hr 1 Patch by TransDERmal route Every Saturday.  fluticasone (FLONASE) 50 mcg/actuation nasal spray 2 Sprays by Both Nostrils route daily.  levocetirizine (XYZAL) 5 mg tablet TAKE 1 TABLET BY MOUTH EVERY DAY (Patient taking differently: daily.  TAKE 1 TABLET BY MOUTH EVERY DAY Indications: Allergic Rhinitis) The surgery is planned for the LE. Surgery -   Right Achilles reconstruction, heel resection History and physical have been reviewed. There have been no significant  changes since the completion of the updated history and physical. 
 
Necessity for the procedure/care is still present and the updated history and physical office notes outline the full long term history of the problem. Please see the updated office notes for the full musculoskeletal examination and surgical plan.  
 
Signed By: Delisa Gooden MD   
 May 15, 2019 7:22 AM

## 2019-05-15 NOTE — DISCHARGE INSTRUCTIONS
INSTRUCTIONS FOLLOWING FOOT SURGERY    ACTIVITY  Elevate foot. No Ice  No weight bearing. Use crutches or knee walker until seen in follow up appointment  Don't put anything into the splint to relieve itching etc. Take one 325mg aspirin daily if okay with your medical doctor and you have no GI ulcer. Get up and out of bed frequently. While in bed move the legs as much as possible)    DIET  Day of Surgery: Clear liquids until no nausea or vomiting; then light, bland diet (Baked chicken, plain rice, grits, scrambled egg, toast). Nothing Greasy, fried or spicy today  Advance to regular diet on second day, unless your doctor orders otherwise. PAIN  Take pain medications as directed by your doctor. Call your doctor if pain is NOT relieved by medication. PAIN MED SIDE EFFECTS  Constipation: Lots of fluids, try prune juice, then OTC stool softeners if necessary  Nausea: Take medication with food. DRESSING CARE Keep dry and in place until follow up appointment    CALL YOUR DOCTOR IF YOU HAVE  Excessive bleeding that does not stop after holding mild pressure over the area. Temperature of 101 degrees or above. Redness, excessive swelling or bruising, and/or green or yellow, smelly discharge from incision. Loss of sensation - cold, white or blue toes. AFTER ANESTHESIA  For the first 24 hours and while taking narcotics for pain: DO NOT Drive, Drink Alcoholic beverages, or make important Decisions. Be aware of dizziness following anesthesia and while taking pain medication. Preventing Infection at Home  We care about preventing infection and avoiding the spread of germs - not only when you are in the hospital but also when you return home. When you return home from the hospital, its important to take the following steps to help prevent infection and avoid spreading germs that could infect you and others. Ask everyone in your home to follow these guidelines, too.     Clean Your Hands  · Clean your hands whenever your hands are visibly dirty, before you eat, before or after touching your mouth, nose or eyes, and before preparing food. Clean them after contact with body fluids, using the restroom, touching animals or changing diapers. · When washing hands, wet them with warm water and work up a lather. Rub hands for at least 15 seconds, then rinse them and pat them dry with a clean towel or paper towel. · When using hand sanitizers, it should take about 15 seconds to rub your hands dry. If not, you probably didnt apply enough . Cover Your Sneeze or Cough  Germs are released into the air whenever you sneeze or cough. To prevent the spread of infection:  · Turn away from other people before coughing or sneezing. · Cover your mouth or nose with a tissue when you cough or sneeze. Put the tissue in the trash. · If you dont have a tissue, cough or sneeze into your upper sleeve, not your hands. · Always clean your hands after coughing or sneezing. Care for Wounds  Your skin is your bodys first line of defense against germs, but an open wound leaves an easy way for germs to enter your body. To prevent infection:  · Clean your hands before and after changing wound dressings, and wear gloves to change dressings if recommended by your doctor. · Take special care with IV lines or other devices inserted into the body. If you must touch them, clean your hands first.  · Follow any specific instructions from your doctor to care for your wounds. Contact your doctor if you experience any signs of infection, such as fever or increased redness at the surgical or wound site. Keep a Clean Home  · Clean or wipe commonly touched hard surfaces like door handles, sinks, tabletops, phones and TV remotes. · Use products labeled disinfectant to kill harmful bacteria and viruses. · Use a clean cloth or paper towel to clean and dry surfaces.  Wiping surfaces with a dirty dishcloth, sponge or towel will only spread germs.  · Never share toothbrushes, peoples, drinking glasses, utensils, razor blades, face cloths or bath towels to avoid spreading germs. · Be sure that the linens that you sleep on are clean. · Keep pets away from wounds and wash your hands after touching pets, their toys or bedding. We care about you and your health. Remember, preventing infections is a team effort between you, your family, friends and health care providers. DISCHARGE SUMMARY from Nurse    PATIENT INSTRUCTIONS:    After general anesthesia or intravenous sedation, for 24 hours or while taking prescription Narcotics:  · Limit your activities  · Do not drive and operate hazardous machinery  · Do not make important personal or business decisions  · Do  not drink alcoholic beverages  · If you have not urinated within 8 hours after discharge, please contact your surgeon on call. *  Please give a list of your current medications to your Primary Care Provider. *  Please update this list whenever your medications are discontinued, doses are      changed, or new medications (including over-the-counter products) are added. *  Please carry medication information at all times in case of emergency situations. These are general instructions for a healthy lifestyle:    No smoking/ No tobacco products/ Avoid exposure to second hand smoke    Surgeon General's Warning:  Quitting smoking now greatly reduces serious risk to your health. Obesity, smoking, and sedentary lifestyle greatly increases your risk for illness    A healthy diet, regular physical exercise & weight monitoring are important for maintaining a healthy lifestyle    You may be retaining fluid if you have a history of heart failure or if you experience any of the following symptoms:  Weight gain of 3 pounds or more overnight or 5 pounds in a week, increased swelling in our hands or feet or shortness of breath while lying flat in bed.   Please call your doctor as soon as you notice any of these symptoms; do not wait until your next office visit. Recognize signs and symptoms of STROKE:    F-face looks uneven    A-arms unable to move or move unevenly    S-speech slurred or non-existent    T-time-call 911 as soon as signs and symptoms begin-DO NOT go       Back to bed or wait to see if you get better-TIME IS BRAIN.

## 2019-05-15 NOTE — ANESTHESIA PREPROCEDURE EVALUATION
Relevant Problems   No relevant active problems       Anesthetic History     PONV          Review of Systems / Medical History  Patient summary reviewed, nursing notes reviewed and pertinent labs reviewed    Pulmonary                   Neuro/Psych         Headaches (migraines)     Cardiovascular                  Exercise tolerance[de-identified] Limited by morbid obesity and painful foot     GI/Hepatic/Renal         Renal disease: stones       Endo/Other        Morbid obesity and arthritis     Other Findings              Physical Exam    Airway  Mallampati: I  TM Distance: 4 - 6 cm  Neck ROM: normal range of motion   Mouth opening: Normal     Cardiovascular  Regular rate and rhythm,  S1 and S2 normal,  no murmur, click, rub, or gallop             Dental  No notable dental hx       Pulmonary  Breath sounds clear to auscultation               Abdominal  GI exam deferred       Other Findings            Anesthetic Plan    ASA: 2  Anesthesia type: general      Post-op pain plan if not by surgeon: peripheral nerve block single      Anesthetic plan and risks discussed with: Patient and Spouse

## 2019-05-15 NOTE — ANESTHESIA POSTPROCEDURE EVALUATION
Procedure(s):  RIGHT ACHILLES RECONSTRUCTION  RIGHT HEEL CALCIUM RESECTION/ CHOICE.     general    Anesthesia Post Evaluation        Patient location during evaluation: PACU  Patient participation: complete - patient participated  Level of consciousness: responsive to verbal stimuli  Pain management: adequate  Airway patency: patent  Anesthetic complications: no  Cardiovascular status: acceptable  Respiratory status: acceptable  Hydration status: euvolemic        Vitals Value Taken Time   /69 5/15/2019 12:01 PM   Temp 36.3 °C (97.3 °F) 5/15/2019 11:35 AM   Pulse 81 5/15/2019 12:01 PM   Resp 14 5/15/2019 11:41 AM   SpO2 95 % 5/15/2019 12:01 PM

## 2019-05-15 NOTE — ANESTHESIA PROCEDURE NOTES
Peripheral Block    Start time: 5/15/2019 9:15 AM  End time: 5/15/2019 9:16 AM  Performed by: Gavi Crow MD  Authorized by: Gavi Crow MD       Pre-procedure:    Indications: at surgeon's request and post-op pain management    Preanesthetic Checklist: patient identified, risks and benefits discussed, site marked, timeout performed, anesthesia consent given and patient being monitored    Timeout Time: 09:14          Block Type:   Block Type:  Femoral single shot  Laterality:  Right  Monitoring:  Responsive to questions, standard ASA monitoring, continuous pulse ox, oxygen, frequent vital sign checks and heart rate  Injection Technique:  Single shot  Procedures: ultrasound guided and nerve stimulator    Patient Position: supine  Prep: chlorhexidine    Location:  Upper thigh  Needle Type:  Stimuplex  Needle Gauge:  22 G  Needle Localization:  Nerve stimulator and ultrasound guidance  Motor Response: minimal motor response >0.4 mA      Assessment:  Number of attempts:  1  Injection Assessment:  Incremental injection every 5 mL, no paresthesia, ultrasound image on chart, local visualized surrounding nerve on ultrasound, negative aspiration for blood and no intravascular symptoms  Patient tolerance:  Patient tolerated the procedure well with no immediate complications

## 2019-05-16 NOTE — OP NOTES
[facility]  OPERATIVE REPORT    Name:  Odalis Moses  MR#:  [id number]  :  1960  ACCOUNT #:  [de-identified]  DATE OF SERVICE:  05/15/2019    PREOPERATIVE DIAGNOSES:  Right calcific Achilles tendinosis, posterior heel exostosis. POSTOPERATIVE DIAGNOSES:   Right calcific Achilles tendinosis, posterior heel exostosis. PROCEDURES PERFORMED:  Right posterior heel exostectomy/calcanectomy, Achilles debridement/reconstruction. SURGEON:  Francisco Preciado MD    ANESTHESIA:  Regional plus general.    COMPLICATIONS:  None. SPECIMENS REMOVED:  None. ESTIMATED BLOOD LOSS:  Minimal    FLUIDS:  Crystalloid. DRAINS:  None. TOURNIQUET TIME:  None. FINDINGS:  The patient had significant amount of calcific tearing of the Achilles tendon. Surprisingly much more involved than the MRI scan referenced. SURGERY IN DETAIL:  After successful induction of regional and general anesthesia the patient was placed prone by the staff and all appropriate precautions were taken. Right leg was scrubbed, prepped and draped in usual sterile fashion. Antibiotic issued. Time-out procedure, identification of surgical markings were done by me. Right leg was addressed with a posterior approach. Posterior Achilles tendon debridement was performed with resection of all calcified torn portions of the Achilles. Wound was then thoroughly cleaned. Posterior heel exostectomy performed with sagittal saw and power rasp. Wound was then thoroughly cleaned again. The Achilles was reconstructed with a combination of 0 PDS, Smith and Nephew anchor system using two Twinfix and two footprints and then PDS. Wound thoroughly cleaned and closed with Monocryl and Ethilon. The patient was placed in a sterile dressing and short-leg splint, and seemed to tolerate the procedures well.       Martha Hull MD [id number]  D:  05/15/2019 11:23  T:  05/15/2019 11:30  JOB #:  [id number]

## 2019-07-10 ENCOUNTER — HOSPITAL ENCOUNTER (OUTPATIENT)
Dept: PHYSICAL THERAPY | Age: 59
Discharge: HOME | End: 2019-07-10
Payer: COMMERCIAL

## 2019-07-10 PROCEDURE — 97016 VASOPNEUMATIC DEVICE THERAPY: CPT

## 2019-07-10 PROCEDURE — 97161 PT EVAL LOW COMPLEX 20 MIN: CPT

## 2019-07-10 PROCEDURE — 97110 THERAPEUTIC EXERCISES: CPT

## 2019-07-10 NOTE — THERAPY EVALUATION
Veronicakar Sanchez  : 1960  Primary: [facility]  Secondary:  [facility]  [address] , [address], [address]  [phone]   [phone]         OUTPATIENT PHYSICAL THERAPY:Initial Assessment 7/10/2019    ICD-10: Treatment Diagnosis: ACHILLES TENDONITIS M76.61; DIFFICULTY WALKING, NOT ELSEWHERE CLASSIFIED R26.2    Precautions/Allergies: non reported  Fall Risk Score: 0 (? 5 = High Risk)  MD Orders: evaluate and treat  TREATMENT PLAN:  Effective Dates: 7/10/2019 TO 9/10/2019. Frequency/Duration: 2 times a week for 60 Day(s) MEDICAL/REFERRING DIAGNOSIS:Achilles tendinitis, right leg [M76.61]  DATE OF ONSET: Surgery date 5-15-19  REFERRING PHYSICIAN:Rachele Dinero MD  RETURN PHYSICIAN APPOINTMENT: did not specify       ASSESSMENT:  Ms. Sarah Gonzales presents to physical therapy with symptoms post right posterior heel exostectomy/calcanectomy and chilles debridement/reconstruction. The examination reveals decreased ROM, strength and antalgic gait pattern . The examination is of low complexity due to a stable clinical presentation. Skilled physical therapy is recommended to progress the plan of care in order for the patient to return to full function with minimal symptoms. PROBLEM LIST (Impacting functional limitations):  1. Pitting edema  2. Decreased ROM  3. Decreased strength  4. Antalgic gait pattern INTERVENTIONS PLANNED:  1. Home Exercise Program (HEP)  2. Manual Therapy  3. Therapeutic Exercise/Strengthening  4. Cryotherapy with vaso-pneumatic compression   GOALS: (Goals have been discussed and agreed upon with patient.)  SHORT-TERM FUNCTIONAL GOALS: Time Frame: 2-4 wks  1. Patient will report 25-50% reduction in overall symptoms  2. Patient will be compliant with HEP and plan of care  3. Patient will have 10 deg of active DF   4. Patient will improve on the LEFS by 3-5 points  5.   Patient will have 25 deg of PF  DISCHARGE GOALS: Time Frame: 6-8 wk  1. Patient will report % reduction in overall symptoms in order to be able to have full function with decreased symptoms  2. Patient will report feeling comfortable progressing their own plan of care with the home program prescribed  3. Patient will be able to ambulate with symmetrical gait pattern for 30 minutes with minimal pain  4. Patient will score 60 or greater on the LEFS in order to demonstrate improved function with less pain  5. Patient will be able to perform 5 single leg heel raises on R indicating good achilles/calf strength     Rehabilitation Potential For Stated Goals: [address] [place] [address] therapy, I certify that the treatment plan above will be carried out by a therapist or under their direction. Thank you for this referral,  Oziel Girard PT,DPT              HISTORY:   History of Present Injury/Illness (Reason for Referral): reports that she initially hurt her achilles in January. She had her surgery in may 15. Three weeks post op she stumble and fell and displaced a stitch. She has since recovered. Numbness is mostly in the ball of the foot. Pain has been doing pretty well in her foot. Past Medical History/Comorbidities: R knee ACL, L knee meniscus   Social History/Living Environment: *  Prior Level of Function/Work/Activity:   Current Medications:       Current Outpatient Medications:     ascorbic acid, vitamin C, (VITAMIN C) 500 mg tablet, Take 1,000 mg by mouth daily. , Disp: , Rfl:     cholecalciferol (VITAMIN D3) 1,000 unit cap, Take 5,000 Units by mouth daily. , Disp: , Rfl:     cyanocobalamin, vitamin B-12, (VITAMIN B-12) 5,000 mcg subl, 5,000 mcg by SubLINGual route daily. , Disp: , Rfl:     OTHER,NON-FORMULARY,, Vit E 180 mg takes daily, Disp: , Rfl:     co-enzyme Q-10 (COQ-10) 100 mg capsule, Take 300 mg by mouth daily. , Disp: , Rfl:     estradiol (CLIMARA) 0.05 mg/24 hr, 1 Patch by TransDERmal route Every Saturday. , Disp: 12 Patch, Rfl: 3    fluticasone (FLONASE) 50 mcg/actuation nasal spray, 2 Sprays by Both Nostrils route daily. , Disp: 3 Bottle, Rfl: 3    levocetirizine (XYZAL) 5 mg tablet, TAKE 1 TABLET BY MOUTH EVERY DAY (Patient taking differently: daily. TAKE 1 TABLET BY MOUTH EVERY DAY  Indications: Allergic Rhinitis), Disp: 80 Tab, Rfl: 1  Date Last Reviewed:7/10/2019     Ambulatory/Rehab Services H2 Model Falls Risk Assessment    Risk Factors:       No Risk Factors Identified Ability to Rise from Chair:       (0)  Ability to rise in a single movement    Falls Prevention Plan:       No modifications necessary   Total: (5 or greater = High Risk): 0    ©2010 [facility] [employer]. All Rights Reserved. [facility] Patent #[id number].  Federal Law prohibits the replication, distribution or use without written permission from [facility] [employer]      Number of Personal Factors/Comorbidities that affect the Plan of Care: 0: LOW COMPLEXITY   EXAMINATION:   (Abbreviations: P-pain, NP- no pain, wnl-within normal limits)  Observation/Orthostatic Postural Assessment:    Relaxed standing posture:  · Incision has healed well, no drainage noted  · Excess skin noted    Palpation/tone/tissue texture:      Soft tissue:  · Gastroc/soleus/posterior tibialis: increased tone and tightness on R, not atrophy noted  · Pitting edema noted around R ankle      Figure 8 edema measurement Date:  7-10-19       Left Right    52 cm 52 cm       Foot Exam Date:  7-10-19       Left Right   Talocrural Joint: wnl Very limited    Rear foot: (neutral to relaxed: 4-6                      deg-normal) - Limited into inversion and eversion   Mid-foot (navicular drop, <7-normal) - hypomobile   Forefoot: (position, mobility) - n/a   First ray position/hallux limitus test: About 65 deg About 55 deg   Windlass test: - n/a   Foot intrinsic strength:  - poor       ROM:   Multisegmental ROM Date:  7-10-19       Flexion -   Extension -   Rotation L -   Rotation R - Ankle ROM Date:  7-10-19       Right Left   DF 0 10   PF 20 35   Inversion 25 40   Eversion 15 15            Strength: Foot and ankle Strength Date:  7-10-19       left right   DF n/a 4   PF - Able to activate without increased pain   Inversion - n/a   Eversion - n/a                Special Tests:   Neurological Screen:   Myotomes: -     Dermatomes: decreased sensation in forefoot in plantar and dorsal aspect on R          Reflexes: -         Neural Tension Tests: n/a  Functional Mobility:    · Double leg squat: n/a  · Gait Pattern: able to ambulate short distances with decreased stance time on R and decreased push off  Balance:  Single leg   L:   R:       Body Structures Involved:  1. Bones  2. Joints  3. Muscles Body Functions Affected:  1. Sensory/Pain  2. Neuromusculoskeletal  3. Movement Related Activities and Participation Affected:  1. General Tasks and Demands  2. Mobility  3. Self Care  4. Domestic Life  5. Community, Social and [place]   Number of elements that affect the Plan of Care: 4+: HIGH COMPLEXITY   CLINICAL PRESENTATION:   Presentation: Stable and uncomplicated: LOW COMPLEXITY   CLINICAL DECISION MAKING:   Outcome Measure: Tool Used: Lower Extremity Functional Scale (LEFS)  Score:  Initial: 15/80 Most Recent: X/80 (Date: -- )   Interpretation of Score: 20 questions each scored on a 5 point scale with 0 representing \"extreme difficulty or unable to perform\" and 4 representing \"no difficulty\". The lower the score, the greater the functional disability. 80/80 represents no disability. Minimal detectable change is 9 points. Medical Necessity:   · Patient is expected to demonstrate progress in strength, range of motion and symptom levels to return to full function. Reason for Services/Other Comments:  · Patient continues to require skilled intervention due to ongoing symptoms.    Use of outcome tool(s) and clinical judgement create a POC that gives a: Clear prediction of patient's progress: LOW COMPLEXITY       · Pain: Initial:    8/10 at worst, 0/10 at rest Post Session:  0/10 at rest ·   Compliance with Program/Exercises: Will assess as treatment progresses. · Recommendations/Intent for next treatment session: \"Next visit will focus on progressing the patients plan of care\". No future appointments.   Total Treatment Duration: 30 min, evaluation 20 min  PT Patient Time In/Time Out  Time In: 1545  Time Out: 1640      Feng Meyer PT, DPT

## 2019-07-10 NOTE — PROGRESS NOTES
Janet Rebekah  : 1960  Primary: Lamount Ped Of [facility]on*  Secondary:  [facility]  [address] , [address], [place] [address]  [phone]   [phone]        OUTPATIENT PHYSICAL THERAPY: Daily Treatment Note  7/10/2019    ICD-10: Treatment Diagnosis: ACHILLES TENDONITIS M76.61; DIFFICULTY WALKING, NOT ELSEWHERE CLASSIFIED R26.2     Precautions/Allergies: non reported  Fall Risk Score: 0 (? 5 = High Risk)  MD Orders: evaluate and treat  TREATMENT PLAN:  Effective Dates: 7/10/2019 TO 9/10/2019. Frequency/Duration: 2 times a week for 60 Day(s) MEDICAL/REFERRING DIAGNOSIS:Achilles tendinitis, right leg [M76.61]  DATE OF ONSET: Surgery date 5-15-19  REFERRING PHYSICIAN:Joe Dinero MD  RETURN PHYSICIAN APPOINTMENT: did not specify        Pre-treatment Symptoms/Complaints:  Reports that she had surgery in May on her achilles  Pain: Initial:   0/10 at rest  Post Session:  0/10 at rest   Medications Last Reviewed:  7/10/2019  Updated Objective Findings:  See evaluation   TREATMENT:   Therapeutic Exercise: (15 min) (Done in order to improve mobility, strength, function and overall understanding of condition)   Date:  7-10-19   Activity/Exercise Parameters   Education Discussed examination findings, HEP, plan of care   DF stretch 30 sec, gentle, using belt   isometrics PF and inversion   Ankle circles  For ROM   Foot intrinsics  Towel grab, intrinsic dominant      Manual Therapy: (-) (Done to improve mobility, reduce tone, guarding and pain)  ·      Modalities: (to reduce swelling and pain)  · Cryotherapy with vaso-pneumatic compression, 15 min, 34 deg  [employer] Portal  Treatment/Session Summary:    · Response to Treatment: Patient tolerated the session well. Discussed her home program and plan of care.   · Communication/Consultation:  None today  · Equipment provided today:  None today  · Recommendations/Intent for next treatment session: Next visit will focus on progressing established plan of care.   Treatment Plan of Care Effective Dates:  7-10-19 to 9-10-19  Total Treatment Billable Duration:  30 min, eval 20 min    PT Patient Time In/Time Out  Time In: 8055  Time Out: 4205    Wally Valles, PT, DPT

## 2019-07-30 ENCOUNTER — HOSPITAL ENCOUNTER (OUTPATIENT)
Dept: PHYSICAL THERAPY | Age: 59
Discharge: HOME | End: 2019-07-30
Payer: COMMERCIAL

## 2019-07-30 PROCEDURE — 97110 THERAPEUTIC EXERCISES: CPT

## 2019-07-30 PROCEDURE — 97016 VASOPNEUMATIC DEVICE THERAPY: CPT

## 2019-07-30 PROCEDURE — 97140 MANUAL THERAPY 1/> REGIONS: CPT

## 2019-07-30 NOTE — PROGRESS NOTES
Bran Forrester  : 1960  Primary: Joleen Carter Of [facility] Staton*  Secondary:  [facility] [address] [place] [address], [address], [place] [address]  [phone]   [phone]        OUTPATIENT PHYSICAL THERAPY: Daily Treatment Note  2019    ICD-10: Treatment Diagnosis: ACHILLES TENDONITIS M76.61; DIFFICULTY WALKING, NOT ELSEWHERE CLASSIFIED R26.2     Precautions/Allergies: non reported  Fall Risk Score: 0 (? 5 = High Risk)  MD Orders: evaluate and treat  TREATMENT PLAN:  Effective Dates: 7/10/2019 TO 9/10/2019. Frequency/Duration: 2 times a week for 60 Day(s) MEDICAL/REFERRING DIAGNOSIS:Achilles tendinitis, right leg [M76.61]  DATE OF ONSET: Surgery date 5-15-19  REFERRING PHYSICIAN:Rajwinder Dinero MD  RETURN PHYSICIAN APPOINTMENT: did not specify        Pre-treatment Symptoms/Complaints:  Reports that she is very sore in her achilles from walking in [facility].    Pain: Initial:  Moderate at rest  Post Session:  Minor pain reported   Medications Last Reviewed:  2019  Updated Objective Findings:  Edema noted around the achilles, tight talocrural joint   TREATMENT:   Therapeutic Exercise: (20 min) (Done in order to improve mobility, strength, function and overall understanding of condition)   Date:  7-10-19 Date  19   Activity/Exercise Parameters    Education Discussed examination findings, HEP, plan of care Discussed HEP   Bike  10 min   DF stretch 30 sec, gentle, using belt 30 sec   isometrics PF and inversion All 4 quadrants: 10x, 2 sets   Ankle circles  For ROM -   Foot intrinsics  Towel grab, intrinsic dominant  Red band, 10x, 2 sets   PF  Supine, red band, full motion, 10x, 3 sets   Wobble board  Sitting, 5x, clockwise and counterclockwise motions     Manual Therapy: (25 min) (Done to improve mobility, reduce tone, guarding and pain)  ·  soft tissue mobilization to gastroc,soleus and superficial fascia around the achilles tendon  · Calcaneal distraction  · Talocrural joint PA and AP mobilization, grade III-IV  · Mid foot mobilization into pronation     Modalities: (to reduce swelling and pain)  · Cryotherapy with vaso-pneumatic compression, 15 min, 34 deg  [employer] Portal  Treatment/Session Summary:    · Response to Treatment: Patient tolerated the session well. She had moderate stiffness today which did improve with manual treatment. Discussed her updated HEP and issued a red theraband  · Communication/Consultation:  None today  · Equipment provided today:  None today  · Recommendations/Intent for next treatment session: Next visit will focus on progressing established plan of care.   Treatment Plan of Care Effective Dates:  7-10-19 to 9-10-19  Total Treatment Billable Duration: 60 min  (TE: 20 min,  Manual: 25 min, Vaso 15 min)    PT Patient Time In/Time Out  Time In: 9035  Time Out: 5195    Lilliana Porter, PT, DPT

## 2019-08-01 ENCOUNTER — HOSPITAL ENCOUNTER (OUTPATIENT)
Dept: PHYSICAL THERAPY | Age: 59
Discharge: HOME | End: 2019-08-01
Payer: COMMERCIAL

## 2019-08-01 PROCEDURE — 97140 MANUAL THERAPY 1/> REGIONS: CPT

## 2019-08-01 PROCEDURE — 97110 THERAPEUTIC EXERCISES: CPT

## 2019-08-01 PROCEDURE — 97016 VASOPNEUMATIC DEVICE THERAPY: CPT

## 2019-08-01 NOTE — PROGRESS NOTES
Sarah Shara  : 1960  Primary: Laura Roa Of [facility] Staton*  Secondary:  [facility]  [address] , [address], [place] [address]  [phone]   [phone]        OUTPATIENT PHYSICAL THERAPY: Daily Treatment Note  2019    ICD-10: Treatment Diagnosis: ACHILLES TENDONITIS M76.61; DIFFICULTY WALKING, NOT ELSEWHERE CLASSIFIED R26.2     Precautions/Allergies: non reported  Fall Risk Score: 0 (? 5 = High Risk)  MD Orders: evaluate and treat  TREATMENT PLAN:  Effective Dates: 7/10/2019 TO 9/10/2019. Frequency/Duration: 2 times a week for 60 Day(s) MEDICAL/REFERRING DIAGNOSIS:Achilles tendinitis, right leg [M76.61]  DATE OF ONSET: Surgery date 5-15-19  REFERRING PHYSICIAN:Jasbir Dinero MD  RETURN PHYSICIAN APPOINTMENT: did not specify        Pre-treatment Symptoms/Complaints:  Reports that she is some better than during her last session. Still has soreness.   Pain: Initial:  Mild pain at rest  Post Session:  Minor pain reported   Medications Last Reviewed:  2019  Updated Objective Findings:  Edema noted around the achilles, tight talocrural joint   TREATMENT:   Therapeutic Exercise: (20 min) (Done in order to improve mobility, strength, function and overall understanding of condition)   Date:  7-10-19 Date  19 Date  19   Activity/Exercise Parameters     Education Discussed examination findings, HEP, plan of care Discussed HEP Discussed HEP   Bike  10 min 10 min   DF stretch 30 sec, gentle, using belt 30 sec In supine: 30 sec  In standing: rocking back and forth 60 sec   isometrics PF and inversion All 4 quadrants: 10x, 2 sets All 4 quadrants: 10x, 2 sets   Ankle circles  For ROM - -   Foot intrinsics  Towel grab, intrinsic dominant  Red band, 10x, 2 sets Green band, 10x, 2 sets   PF  Supine, red band, full motion, 10x, 3 sets · Supine, green band, full motion, 10x, 3 sets  · Sitting: full motion, concentrated to push off big toe   Wobble board  Sitting, 5x, clockwise and counterclockwise motions Sitting, 5x, clockwise and counterclockwise motions     Manual Therapy: (25 min) (Done to improve mobility, reduce tone, guarding and pain)  ·  soft tissue mobilization to gastroc,soleus and superficial fascia around the achilles tendon  · Calcaneal distraction  · Talocrural joint PA and AP mobilization, grade III-IV  · Mid foot mobilization into pronation     Modalities: (to reduce swelling and pain)  · Cryotherapy with vaso-pneumatic compression, 15 min, 34 deg  [employer] Portal  Treatment/Session Summary:    · Response to Treatment: Patient tolerated the session well. Her talocrural motion and gastro/soleus are improving but still tight. Issued green band. Discussed her home program and plan of care  · Communication/Consultation:  None today  · Equipment provided today:  None today  · Recommendations/Intent for next treatment session: Next visit will focus on progressing established plan of care.   Treatment Plan of Care Effective Dates:  7-10-19 to 9-10-19  Total Treatment Billable Duration: 60 min  (TE: 20 min,  Manual: 25 min, Vaso 15 min)    PT Patient Time In/Time Out  Time In: 1523  Time Out: 0495    Hao Emerson, PT, DPT

## 2019-08-06 ENCOUNTER — HOSPITAL ENCOUNTER (OUTPATIENT)
Dept: PHYSICAL THERAPY | Age: 59
Discharge: HOME | End: 2019-08-06
Payer: COMMERCIAL

## 2019-08-06 PROCEDURE — 97112 NEUROMUSCULAR REEDUCATION: CPT

## 2019-08-06 PROCEDURE — 97110 THERAPEUTIC EXERCISES: CPT

## 2019-08-06 PROCEDURE — 97016 VASOPNEUMATIC DEVICE THERAPY: CPT

## 2019-08-06 NOTE — PROGRESS NOTES
Keke Javier  : 1960  Primary: Marty Patterson Of [facility] Staton*  Secondary:  [facility]  [facility] [address], [place] [address]  [phone]   [phone]        OUTPATIENT PHYSICAL THERAPY: Daily Treatment Note  2019    ICD-10: Treatment Diagnosis: ACHILLES TENDONITIS M76.61; DIFFICULTY WALKING, NOT ELSEWHERE CLASSIFIED R26.2     Precautions/Allergies: non reported  Fall Risk Score: 0 (? 5 = High Risk)  MD Orders: evaluate and treat  TREATMENT PLAN:  Effective Dates: 7/10/2019 TO 9/10/2019. Frequency/Duration: 2 times a week for 60 Day(s) MEDICAL/REFERRING DIAGNOSIS:Achilles tendinitis, right leg [M76.61]  DATE OF ONSET: Surgery date 5-15-19  REFERRING PHYSICIAN:Meg Dinero MD  RETURN PHYSICIAN APPOINTMENT: did not specify        Pre-treatment Symptoms/Complaints:  Reports that she is better since last session.  She reports she is doing her homework once a day  Pain: Initial:  0 Post Session:  Minor pain reported   Medications Last Reviewed:  2019  Updated Objective Findings:  Edema noted around the achilles, tight talocrural joint   TREATMENT:   Therapeutic Exercise: (20 min) (Done in order to improve mobility, strength, function and overall understanding of condition)   Date:  7-10-19 Date  19 Date  19 Date:  19   Activity/Exercise Parameters      Education Discussed examination findings, HEP, plan of care Discussed HEP Discussed HEP    Bike  10 min 10 min 10 min   DF stretch 30 sec, gentle, using belt 30 sec In supine: 30 sec  In standing: rocking back and forth 60 sec In supine: 30 sec  In standing: rocking back and forth 60 sec   isometrics PF and inversion All 4 quadrants: 10x, 2 sets All 4 quadrants: 10x, 2 sets All 4 quadrants: 10x, 2 sets   Ankle circles  For ROM - -    Foot intrinsics  Towel grab, intrinsic dominant  Red band, 10x, 2 sets Green band, 10x, 2 sets Green band, 10x, 2 sets   PF  Supine, red band, full motion, 10x, 3 sets · Supine, green band, full motion, 10x, 3 sets  · Sitting: full motion, concentrated to push off big toe · Supine, green band, full motion, 10x, 3 sets  · Sitting: full motion, concentrated to push off big toe   Wobble board  Sitting, 5x, clockwise and counterclockwise motions Sitting, 5x, clockwise and counterclockwise motions Sitting, 5x, clockwise and counterclockwise motions     Manual Therapy: (25 min) (Done to improve mobility, reduce tone, guarding and pain)  ·  soft tissue mobilization to gastroc,soleus and superficial fascia around the achilles tendon  · Calcaneal distraction  · Talocrural joint PA and AP mobilization, grade III-IV  · Mid foot mobilization into pronation     Modalities: (to reduce swelling and pain)  · Cryotherapy with vaso-pneumatic compression, 15 min, 34 deg  [employer] Portal  Treatment/Session Summary:    · Response to Treatment: Patient tolerated the session well. Continues to have restrictions in talocrural motion and gastro/soleus but improved post manual.  Discussed continuing her home program and plan of care  · Communication/Consultation:  None today  · Equipment provided today:  None today  · Recommendations/Intent for next treatment session: Next visit will focus on progressing established plan of care.   Treatment Plan of Care Effective Dates:  7-10-19 to 9-10-19  Total Treatment Billable Duration: 60 min  (TE: 20 min,  Manual: 25 min, Vaso 15 min)    PT Patient Time In/Time Out  Time In: 1430  Time Out: 1530    Signed By: Lauri Worthy DPT     August 6, 2019

## 2019-08-08 ENCOUNTER — HOSPITAL ENCOUNTER (OUTPATIENT)
Dept: PHYSICAL THERAPY | Age: 59
Discharge: HOME | End: 2019-08-08
Payer: COMMERCIAL

## 2019-08-08 PROCEDURE — 97110 THERAPEUTIC EXERCISES: CPT | Performed by: PHYSICAL THERAPIST

## 2019-08-08 PROCEDURE — 97016 VASOPNEUMATIC DEVICE THERAPY: CPT | Performed by: PHYSICAL THERAPIST

## 2019-08-08 PROCEDURE — 97140 MANUAL THERAPY 1/> REGIONS: CPT | Performed by: PHYSICAL THERAPIST

## 2019-08-08 NOTE — PROGRESS NOTES
Edilma Daily  : 1960  Primary: Aleshia Leah Of [place]*  Secondary:  [facility]  [address] , [address], [place] [address]  [phone]   [phone]        OUTPATIENT PHYSICAL THERAPY: Daily Treatment Note  2019    ICD-10: Treatment Diagnosis: ACHILLES TENDONITIS M76.61; DIFFICULTY WALKING, NOT ELSEWHERE CLASSIFIED R26.2     Precautions/Allergies: non reported  Fall Risk Score: 0 (? 5 = High Risk)  MD Orders: evaluate and treat  TREATMENT PLAN:  Effective Dates: 7/10/2019 TO 9/10/2019. Frequency/Duration: 2 times a week for 60 Day(s) MEDICAL/REFERRING DIAGNOSIS:Achilles tendinitis, right leg [M76.61]  DATE OF ONSET: Surgery date 5-15-19  REFERRING PHYSICIAN:Marcia Dinero MD  RETURN PHYSICIAN APPOINTMENT: did not specify        Pre-treatment Symptoms/Complaints: Pt reports she saw Dr. Kaci Goldman after last PT visit, and he put her on steroids. She states her R ankle pain, swelling and redness has improved significantly with 1.5 days on steroids.    Pain: Initial:  3/10; 4-5/10 w/ exercises Post Session:  2-3/10 after [address]   Medications Last Reviewed:  2019  Updated Objective Findings:  Edema noted around the achilles, tight talocrural joint   TREATMENT:   Therapeutic Exercise: (30 min) (Done in order to improve mobility, strength, function and overall understanding of condition)   Date:  7-10-19 Date  19 Date  19 Date:  19 Date:  19   Activity/Exercise Parameters       Education Discussed examination findings, HEP, plan of care Discussed HEP Discussed HEP     Bike  10 min 10 min 10 min Recumbent stepper to improve motion; L2.5; 10min   DF stretch 30 sec, gentle, using belt 30 sec In supine: 30 sec  In standing: rocking back and forth 60 sec In supine: 30 sec  In standing: rocking back and forth 60 sec stdg on board; 5 x 30sec   isometrics PF and inversion All 4 quadrants: 10x, 2 sets All 4 quadrants: 10x, 2 sets All 4 quadrants: 10x, 2 sets    Ankle circles  For ROM - -     Foot intrinsics  Towel grab, intrinsic dominant  Red band, 10x, 2 sets Green band, 10x, 2 sets Green band, 10x, 2 sets    PF  Supine, red band, full motion, 10x, 3 sets · Supine, green band, full motion, 10x, 3 sets  · Sitting: full motion, concentrated to push off big toe · Supine, green band, full motion, 10x, 3 sets  · Sitting: full motion, concentrated to push off big toe ·    Wobble board  Sitting, 5x, clockwise and counterclockwise motions Sitting, 5x, clockwise and counterclockwise motions Sitting, 5x, clockwise and counterclockwise motions    Stdg Rockerboard     Forward/Back 30x w/ 1UE   Stdg Rockerboard     Side/Side; 50x w/ and w/out UE A   Stdg SLS      5 x 10sec B   Kinesiotaping for scar tissue mobilization and space correction for medial arch unloading     Done     Manual Therapy: (30 min) (Done to improve mobility, reduce tone, guarding and pain)  ·  soft tissue mobilization to gastroc,soleus and superficial fascia around the achilles tendon  · Calcaneal distraction  · Tibiotalar/Talocrural joint PA and AP mobilization, grade III-IV  · Mid foot mobilization into pronation   · PROM  X 4 directions    Modalities:( 10 minutes) (Done to reduce swelling and pain)  · Cryotherapy with vaso-pneumatic compression, low; 34deg; supine w/ LE elevated    Treatment/Session Summary:    · Response to Treatment: Pt was challenged by stdg HS/GS stretching on board, as well as, stdg rockerboard activities and SLS due to pain, fear and lack of confidence. With encouragement, she was able to perform all with fairly good form that improved with increased repetitions, indicating improving muscle memory. I explained to patient that she is now > 12 weeks post-op and much challenge herself, especially while on steroids to aide with managing inflammation/pain. · Communication/Consultation:  Pt encouraged to practice driving over the weekend in an empty parking lot. · Equipment provided today:  None today  · Recommendations/Intent for next treatment session: Next visit will focus on progressing established plan of care.   Treatment Plan of Care Effective Dates:  7-10-19 to 9-10-19  Total Treatment Billable Duration: 70 min  (TE: 30 min,  Manual: 30 min, Vaso 10min)    PT Patient Time In/Time Out  Time In: 7674  Time Out: 5308    Signed By: Handy Rees, PT     August 8, 2019

## 2019-08-13 ENCOUNTER — HOSPITAL ENCOUNTER (OUTPATIENT)
Dept: PHYSICAL THERAPY | Age: 59
Discharge: HOME | End: 2019-08-13
Payer: COMMERCIAL

## 2019-08-13 PROCEDURE — 97016 VASOPNEUMATIC DEVICE THERAPY: CPT

## 2019-08-13 PROCEDURE — 97110 THERAPEUTIC EXERCISES: CPT

## 2019-08-13 PROCEDURE — 97140 MANUAL THERAPY 1/> REGIONS: CPT

## 2019-08-13 NOTE — PROGRESS NOTES
Jennifer Gómez  : 1960  Primary: Sanna Beth Of Stephanie Vickjovon*  Secondary:  [facility]  [facility] [address], [place] [address]  [phone]   [phone]        OUTPATIENT PHYSICAL THERAPY: Daily Treatment Note  2019    ICD-10: Treatment Diagnosis: ACHILLES TENDONITIS M76.61; DIFFICULTY WALKING, NOT ELSEWHERE CLASSIFIED R26.2     Precautions/Allergies: non reported  Fall Risk Score: 0 (? 5 = High Risk)  MD Orders: evaluate and treat  TREATMENT PLAN:  Effective Dates: 7/10/2019 TO 9/10/2019. Frequency/Duration: 2 times a week for 60 Day(s) MEDICAL/REFERRING DIAGNOSIS:Achilles tendinitis, right leg [M76.61]  DATE OF ONSET: Surgery date 5-15-19  REFERRING PHYSICIAN:Tonya Dinero MD  RETURN PHYSICIAN APPOINTMENT: did not specify        Pre-treatment Symptoms/Complaints: Pt reports she is better since her steroid meds but her heel is still very sore.   Pain: Initial:  3/10 Post Session:  Minor soreness reported   Medications Last Reviewed:  2019  Updated Objective Findings:  Edema noted around the achilles, tight talocrural joint   TREATMENT:   Therapeutic Exercise: (20min) (Done in order to improve mobility, strength, function and overall understanding of condition)   Date:  19 Date:  19 Date  19   Activity/Exercise      Education   Discussed HEP   Bike 10 min Recumbent stepper to improve motion; L2.5; 10min 10 min   DF stretch In supine: 30 sec  In standing: rocking back and forth 60 sec stdg on board; 5 x 30sec Standing, knee over foot, 20x     isometrics All 4 quadrants: 10x, 2 sets  4 quadrants, max motion   Foot intrinsics  Green band, 10x, 2 sets  HEP   PF · Supine, green band, full motion, 10x, 3 sets  · Sitting: full motion, concentrated to push off big toe ·  · Sitting,full motion, 20x  · Standin% WB on effected foot, 10x, 3 sets   Wobble board Sitting, 5x, clockwise and counterclockwise motions  -   Stdg Rockerboard  Forward/Back 30x w/ 1UE -   Stdg Rockerboard  Side/Side; 50x w/ and w/out UE A -   SL stance  5 x 10sec B SL stance with forward-backward weight shift, 10x, 2 sets, double support   Kinesiotaping for scar tissue mobilization and space correction for medial arch unloading  Done -     Manual Therapy: (30 min) (Done to improve mobility, reduce tone, guarding and pain)  ·  soft tissue mobilization to scar tissue especially along the calcaneous  · Tibiotalar/Talocrural joint PA and AP mobilization, grade III-IV  · calcaneous distraction, medial and lateral mobilization, grade III-IV  · Mid foot mobilization into pronation, done in supine and standing  ·     Modalities:( 15 minutes) (Done to reduce swelling and pain)  · Cryotherapy with vaso-pneumatic compression, low; 34deg; supine w/ LE elevated    Treatment/Session Summary:    · Response to Treatment: patient tolerated the session relatively well. She has moderate tenderness in her heel which improved with manual treatment of the scar tissue. She was encouraged to work more on this at home as well getting use to loading the foot. Discussed her home program and plan of care. · Communication/Consultation: -   · Equipment provided today:  None today  · Recommendations/Intent for next treatment session: Next visit will focus on progressing established plan of care.   Treatment Plan of Care Effective Dates:  7-10-19 to 9-10-19  Total Treatment Billable Duration: 65 min  (TE: 20 min,  Manual: 30 min, Vaso 15min)    PT Patient Time In/Time Out  Time In: 5992  Time Out: 6555    Signed By: Robert Luther, PT, DPT     August 13, 2019

## 2019-08-15 ENCOUNTER — HOSPITAL ENCOUNTER (OUTPATIENT)
Dept: PHYSICAL THERAPY | Age: 59
Discharge: HOME | End: 2019-08-15
Payer: COMMERCIAL

## 2019-08-15 PROCEDURE — 97016 VASOPNEUMATIC DEVICE THERAPY: CPT

## 2019-08-15 PROCEDURE — 97110 THERAPEUTIC EXERCISES: CPT

## 2019-08-15 PROCEDURE — 97140 MANUAL THERAPY 1/> REGIONS: CPT

## 2019-08-15 NOTE — PROGRESS NOTES
Abhishek Falk  : 1960  Primary: Mara Zacarias Of [facility] Staton*  Secondary:  [facility] at [facility]  [address] , [address], [place] [address]  [phone]   [phone]        OUTPATIENT PHYSICAL THERAPY: Daily Treatment Note  8/15/2019    ICD-10: Treatment Diagnosis: ACHILLES TENDONITIS M76.61; DIFFICULTY WALKING, NOT ELSEWHERE CLASSIFIED R26.2     Precautions/Allergies: non reported  Fall Risk Score: 0 (? 5 = High Risk)  MD Orders: evaluate and treat  TREATMENT PLAN:  Effective Dates: 7/10/2019 TO 9/10/2019. Frequency/Duration: 2 times a week for 60 Day(s) MEDICAL/REFERRING DIAGNOSIS:Achilles tendinitis, right leg [M76.61]  DATE OF ONSET: Surgery date 5-15-19  REFERRING PHYSICIAN:Edmund Dinero MD  RETURN PHYSICIAN APPOINTMENT: did not specify        Pre-treatment Symptoms/Complaints: Pt reports she feels like her foot is swelling again. States she has been sitting a lot at work and at home because she is studying for a big test.  Does report that she has done some of the exercises.    Pain: Initial:  3/10 Post Session: minor stiffness reported, no pain reported   Medications Last Reviewed:  8/15/2019  Updated Objective Findings:  Edema and slight redness noted around the achilles (does not feel very warm, denies fever or other systemic symptoms), tight talocrural joint-continued    TREATMENT:   Therapeutic Exercise: (25min) (Done in order to improve mobility, strength, function and overall understanding of condition)   Date:  19 Date  19 Date  8-15-19   Activity/Exercise      Education  Discussed HEP Discussed HEP   Bike/ Nu step Recumbent stepper to improve motion; L2.5; 10min 10 min 10 min   DF stretch stdg on board; 5 x 30sec Standing, knee over foot, 20x   knee over foot, 20x   isometrics  4 quadrants, max motion 4 quadrants, 10x, 5 sec hold   Foot intrinsics   HEP HEP   PF ·  · Sitting,full motion, 20x  · Standin% WB on effected foot, 10x, 3 sets · Sitting,full motion, 20x  · Standin% WB on effected foot, 10x, 3 sets   Stdg Rockerboard Forward/Back 30x w/ 1UE - -   Stdg Rockerboard Side/Side; 50x w/ and w/out UE A - -   SL stance 5 x 10sec B SL stance with forward-backward weight shift, 10x, 2 sets, double support SL stance with forward-backward weight shift, 10x, 2 sets, double support   Step up   4\", 10x,2 sets, double support     Manual Therapy: (20 min) (Done to improve mobility, reduce tone, guarding and pain)  ·  soft tissue mobilization to scar tissue especially along the calcaneous  · Tibiotalar/Talocrural joint PA and AP mobilization, grade III-IV  · calcaneous distraction, medial and lateral mobilization, grade III-IV  · Mid foot mobilization into pronation, done in supine and standing      Modalities:( 15 minutes) (Done to reduce swelling and pain)  · Cryotherapy with vaso-pneumatic compression, low; 34deg; supine w/ LE elevated    Treatment/Session Summary:    · Response to Treatment: patient tolerated the session relatively well. She has moderate tenderness in her heel which improved with manual treatment of the scar tissue. She was encouraged to work more on this at home as well getting use to loading the foot. Discussed her home program and plan of care. · Communication/Consultation: -   · Equipment provided today:  None today  · Recommendations/Intent for next treatment session: Next visit will focus on progressing established plan of care.   Treatment Plan of Care Effective Dates:  7-10-19 to 9-10-19  Total Treatment Billable Duration: 60 min  (TE: 25 min,  Manual: 20 min, Vaso 15min)    PT Patient Time In/Time Out  Time In: 9198  Time Out: 1827    Signed By: Dorene Severin, PT, DPT     August 15, 2019

## 2019-08-20 ENCOUNTER — HOSPITAL ENCOUNTER (OUTPATIENT)
Dept: PHYSICAL THERAPY | Age: 59
Discharge: HOME | End: 2019-08-20
Payer: COMMERCIAL

## 2019-08-20 PROCEDURE — 97140 MANUAL THERAPY 1/> REGIONS: CPT

## 2019-08-20 PROCEDURE — 97110 THERAPEUTIC EXERCISES: CPT

## 2019-08-20 PROCEDURE — 97016 VASOPNEUMATIC DEVICE THERAPY: CPT

## 2019-08-20 NOTE — PROGRESS NOTES
Mela Rutherford  : 1960  Primary: C[facility]ost [facility]*  Secondary:  [facility]rapy [facility]  [facility] [address], [place] [address]  [phone]   [phone]        OUTPATIENT PHYSICAL THERAPY: Daily Treatment Note  2019    ICD-10: Treatment Diagnosis: ACHILLES TENDONITIS M76.61; DIFFICULTY WALKING, NOT ELSEWHERE CLASSIFIED R26.2     Precautions/Allergies: non reported  Fall Risk Score: 0 (? 5 = High Risk)  MD Orders: evaluate and treat  TREATMENT PLAN:  Effective Dates: 7/10/2019 TO 9/10/2019. Frequency/Duration: 2 times a week for 60 Day(s) MEDICAL/REFERRING DIAGNOSIS:Achilles tendinitis, right leg [M76.61]  DATE OF ONSET: Surgery date 5-15-19  REFERRING PHYSICIAN:Darrell Dinero MD  RETURN PHYSICIAN APPOINTMENT: did not specify        Pre-treatment Symptoms/Complaints: Patient reports that she is still dealing with swelling which makes it harder for mobility  Otherwise feels like she is improving.   Pain: Initial:  Mild pain reported Post Session: minor stiffness reported, no pain reported   Medications Last Reviewed:  2019  Updated Objective Findings:  Edema and slight redness noted around the achilles (does not feel very warm, denies fever or other systemic symptoms), tight talocrural joint-continued    TREATMENT:   Therapeutic Exercise: (25min) (Done in order to improve mobility, strength, function and overall understanding of condition)   Date  19 Date  8-15-19 Date  19   Activity/Exercise      Education Discussed HEP Discussed HEP DIscussed HEP   Bike/ Nu step 10 min 10 min 10 min   DF stretch Standing, knee over foot, 20x   knee over foot, 20x Knee over foot, 20x  Incline board: 30 sec, 2x   isometrics 4 quadrants, max motion 4 quadrants, 10x, 5 sec hold 4 quadrants, 10x, 5 sec hold, max motion   Foot intrinsics  HEP HEP Not done today   PF · Sitting,full motion, 20x  · Standin% WB on effected foot, 10x, 3 sets · Sitting,full motion, 20x  · Standin% WB on effected foot, 10x, 3 sets · Supine, 20x, full motion  · Sitting,full motion, 20x  · Standin% WB on effected foot, 10x, 3 sets   SL stance SL stance with forward-backward weight shift, 10x, 2 sets, double support SL stance with forward-backward weight shift, 10x, 2 sets, double support HEP   Step up  4\", 10x,2 sets, double support HEP   Gait training   Worked on ambulating with a proper heel strike and toe off, 100 ft     Manual Therapy: (20 min) (Done to improve mobility, reduce tone, guarding and pain)  ·  soft tissue mobilization to scar tissue especially along the calcaneous  · Tibiotalar/Talocrural joint PA and AP mobilization, grade III-IV  · calcaneous distraction, medial and lateral mobilization, grade III-IV  · Mid foot mobilization into pronation, done in supine and standing      Modalities:( 15 minutes) (Done to reduce swelling and pain)  · Cryotherapy with vaso-pneumatic compression, low; 34deg; supine w/ LE elevated    Treatment/Session Summary:    · Response to Treatment: patient tolerated the session well. Her DF mobility continues to be most restricted which is related to the increased swelling. Discussed her updated HEP. · Communication/Consultation: Dr. Daniela Silva will be contacted regarding the increased swelling and redness   · Equipment provided today:  None today  · Recommendations/Intent for next treatment session: Next visit will focus on progressing established plan of care.   Treatment Plan of Care Effective Dates:  7-10-19 to 9-10-19  Total Treatment Billable Duration: 60 min  (TE: 25 min,  Manual: 20 min, Vaso 15min)    PT Patient Time In/Time Out  Time In: 6941  Time Out: 5744    Signed By: Srikanth Vasquez, PT, DPT     2019

## 2019-08-22 ENCOUNTER — HOSPITAL ENCOUNTER (OUTPATIENT)
Dept: PHYSICAL THERAPY | Age: 59
Discharge: HOME | End: 2019-08-22
Payer: COMMERCIAL

## 2019-08-22 PROCEDURE — 97016 VASOPNEUMATIC DEVICE THERAPY: CPT

## 2019-08-22 PROCEDURE — 97110 THERAPEUTIC EXERCISES: CPT

## 2019-08-22 PROCEDURE — 97140 MANUAL THERAPY 1/> REGIONS: CPT

## 2019-08-22 NOTE — PROGRESS NOTES
Mela Rutherford  : 1960  Primary: [facility]ost [facility]*  Secondary:  Therapy Center at [facility]  [address] , [address], [place] [address]  [phone]   [phone]        OUTPATIENT PHYSICAL THERAPY: Daily Treatment Note  2019    ICD-10: Treatment Diagnosis: ACHILLES TENDONITIS M76.61; DIFFICULTY WALKING, NOT ELSEWHERE CLASSIFIED R26.2     Precautions/Allergies: non reported  Fall Risk Score: 0 (? 5 = High Risk)  MD Orders: evaluate and treat  TREATMENT PLAN:  Effective Dates: 7/10/2019 TO 9/10/2019. Frequency/Duration: 2 times a week for 60 Day(s) MEDICAL/REFERRING DIAGNOSIS:Achilles tendinitis, right leg [M76.61]  DATE OF ONSET: Surgery date 5-15-19  REFERRING PHYSICIAN:Darrell Dinero MD  RETURN PHYSICIAN APPOINTMENT: did not specify        Pre-treatment Symptoms/Complaints: Patient reports that she still has issues with swelling. States that her scar was raised today.   Pain: Initial:  Mild pain reported Post Session: mild pain/soreness reported   Medications Last Reviewed:  2019  Updated Objective Findings: mild redness noted (improved from earlier this week)   TREATMENT:   Therapeutic Exercise: (30min) (Done in order to improve mobility, strength, function and overall understanding of condition)   Date  8-15-19 Date  19 Date  19   Activity/Exercise      Education Discussed HEP DIscussed HEP Discussed HEP   Bike/ Nu step 10 min 10 min 10 min   DF stretch knee over foot, 20x Knee over foot, 20x  Incline board: 30 sec, 2x Knee over foot, 20x  Incline board: 30 sec, 2x   isometrics 4 quadrants, 10x, 5 sec hold 4 quadrants, 10x, 5 sec hold, max motion 4 quadrants, 10x, 5 sec hold, max motion   Foot intrinsics  HEP Not done today    PF · Sitting,full motion, 20x  · Standin% WB on effected foot, 10x, 3 sets · Supine, 20x, full motion  · Sitting,full motion, 20x  · Standin% WB on effected foot, 10x, 3 sets · Supine, 20x, full motion  · Sitting,full motion, 20x  · Standin% WB on effected foot, 10x, 3 sets   SL stance SL stance with forward-backward weight shift, 10x, 2 sets, double support HEP Working on weight acceptance on R   Step up 4\", 10x,2 sets, double support HEP 10x, 2 sets, 4\"   Gait training  Worked on ambulating with a proper heel strike and toe off, 100 ft Worked on ambulating with a proper heel strike and toe off, 100 ft     Manual Therapy: (20 min) (Done to improve mobility, reduce tone, guarding and pain)  ·  soft tissue mobilization to scar tissue especially along the calcaneous  · Tibiotalar/Talocrural joint PA and AP mobilization, grade III-IV  · calcaneous distraction, medial and lateral mobilization, grade III-IV  · Mid foot mobilization into pronation, done in supine and standing      Modalities:( 15 minutes) (Done to reduce swelling and pain)  · Cryotherapy with vaso-pneumatic compression, low; 34deg; supine w/ LE elevated    Treatment/Session Summary:    · Response to Treatment: patient tolerated the session well. She still has motion restrictions which we are continuing to work through. Also working gradually on strength, balance and improving her gait pattern  · Communication/Consultation:   · Equipment provided today:  None today  · Recommendations/Intent for next treatment session: Next visit will focus on progressing established plan of care.   Treatment Plan of Care Effective Dates:  7-10-19 to 9-10-19  Total Treatment Billable Duration: 65 min  (TE: 25 min,  Manual: 20 min, Vaso 15min)    PT Patient Time In/Time Out  Time In: 0004  Time Out: 9342    Signed By: Surya Liz, PT, DPT     2019

## 2019-08-27 ENCOUNTER — HOSPITAL ENCOUNTER (OUTPATIENT)
Dept: PHYSICAL THERAPY | Age: 59
Discharge: HOME | End: 2019-08-27
Payer: COMMERCIAL

## 2019-08-27 PROCEDURE — 97016 VASOPNEUMATIC DEVICE THERAPY: CPT

## 2019-08-27 PROCEDURE — 97110 THERAPEUTIC EXERCISES: CPT

## 2019-08-27 PROCEDURE — 97140 MANUAL THERAPY 1/> REGIONS: CPT

## 2019-08-27 NOTE — PROGRESS NOTES
Leretha Gitelman  : 1960  Primary: Zheng Mcnair [facility]*  Secondary:  Therapy Center at [facility]  [address] , [address], [place] [address]  [phone]   [phone]        OUTPATIENT PHYSICAL THERAPY: Daily Treatment Note  2019   PROGRESS NOTE NEXT VISIT    ICD-10: Treatment Diagnosis: ACHILLES TENDONITIS M76.61; DIFFICULTY WALKING, NOT ELSEWHERE CLASSIFIED R26.2     Precautions/Allergies: non reported  Fall Risk Score: 0 (? 5 = High Risk)  MD Orders: evaluate and treat  TREATMENT PLAN:  Effective Dates: 7/10/2019 TO 9/10/2019. Frequency/Duration: 2 times a week for 60 Day(s) MEDICAL/REFERRING DIAGNOSIS:Achilles tendinitis, right leg [M76.61]  DATE OF ONSET: Surgery date 5-15-19  REFERRING PHYSICIAN:Honorio Dinero MD  RETURN PHYSICIAN APPOINTMENT: did not specify        Pre-treatment Symptoms/Complaints: Patient reports that her redness is better and swelling is not as much. Today she feels increased knee pain.   Pain: Initial:  Mild pain reported Post Session: mild pain/soreness reported   Medications Last Reviewed:  2019  Updated Objective Findings: mild redness noted (improved from earlier this week)   TREATMENT:   Therapeutic Exercise: (30min) (Done in order to improve mobility, strength, function and overall understanding of condition)   Date  19 Date  19 Date  19   Activity/Exercise      Education DIscussed HEP Discussed HEP Discussed HEP   Bike/ Nu step 10 min 10 min 10 min   DF stretch Knee over foot, 20x  Incline board: 30 sec, 2x Knee over foot, 20x  Incline board: 30 sec, 2x Knee over foot, 20x  Incline board: 30 sec, 2x   isometrics 4 quadrants, 10x, 5 sec hold, max motion 4 quadrants, 10x, 5 sec hold, max motion 4 quadrants, 10x, 5 sec hold, max motion   Foot intrinsics  Not done today  -   PF · Supine, 20x, full motion  · Sitting,full motion, 20x  · Standin% WB on effected foot, 10x, 3 sets · Supine, 20x, full motion  · Sitting,full motion, 20x  · Standin% WB on effected foot, 10x, 3 sets · Supine, 20x, full motion  · Sitting,full motion, 20x  · Standin% WB on effected foot, 10x, 3 sets   SL stance HEP Working on weight acceptance on R Reviewed    Step up HEP 10x, 2 sets, 4\" 10x, 3 sets, 4\" w/ contralateral hip flexion   Gait training Worked on ambulating with a proper heel strike and toe off, 100 ft Worked on ambulating with a proper heel strike and toe off, 100 ft Worked on ambulating with a proper heel strike and toe off, 100 ft     Manual Therapy: (20 min) (Done to improve mobility, reduce tone, guarding and pain)  ·  soft tissue mobilization to scar tissue especially along the calcaneous, gastroc and soleus  · Tibiotalar/Talocrural joint PA and AP mobilization, grade III-IV  · calcaneous distraction, medial and lateral mobilization, grade III-IV  · Mid foot mobilization into pronation, done in supine and standing      Modalities:( 15 minutes) (Done to reduce swelling and pain)  · Cryotherapy with vaso-pneumatic compression, low; 34deg; supine w/ LE elevated    Treatment/Session Summary:    · Response to Treatment: patient tolerated the session well. Her redness and swelling were much better today. Continuing to work on strength and mobility which are showing gradual improvements. · Communication/Consultation:   · Equipment provided today:  None today  · Recommendations/Intent for next treatment session: Next visit will focus on progressing established plan of care.   Treatment Plan of Care Effective Dates:  7-10-19 to 9-10-19  Total Treatment Billable Duration: 65 min  (TE: 25 min,  Manual: 20 min, Vaso 15min)    PT Patient Time In/Time Out  Time In: 0974  Time Out: 8675    Signed By: Buster Toure, PT, DPT     2019

## 2019-09-04 ENCOUNTER — HOSPITAL ENCOUNTER (OUTPATIENT)
Dept: PHYSICAL THERAPY | Age: 59
Discharge: HOME | End: 2019-09-04
Payer: COMMERCIAL

## 2019-09-04 PROCEDURE — 97140 MANUAL THERAPY 1/> REGIONS: CPT

## 2019-09-04 PROCEDURE — 97016 VASOPNEUMATIC DEVICE THERAPY: CPT

## 2019-09-04 PROCEDURE — 97110 THERAPEUTIC EXERCISES: CPT

## 2019-09-04 NOTE — THERAPY RECERTIFICATION
Abhishek Falk  : 1960  Primary: [facility]*  [facility]:  [facility] [address], [place] [address]  [phone]   [phone]         OUTPATIENT PHYSICAL THERAPY:Recertification 3/3/8453    ICD-10: Treatment Diagnosis: ACHILLES TENDONITIS M76.61; DIFFICULTY WALKING, NOT ELSEWHERE CLASSIFIED R26.2    Precautions/Allergies: non reported  Fall Risk Score: 0 (? 5 = High Risk)  MD Orders: evaluate and treat  TREATMENT PLAN:  Effective Dates: 2019 TO 2019. Frequency/Duration: 2 times a week for 60 Day(s) MEDICAL/REFERRING DIAGNOSIS:Achilles tendinitis, right leg [M76.61]  DATE OF ONSET: Surgery date 5-15-19  REFERRING PHYSICIAN:Edmund Dinero MD  RETURN PHYSICIAN APPOINTMENT: did not specify       ASSESSMENT:  Ms. Jose Crespo has come for a total of 11 visits since starting physical therapy on 7-10-19. During that time she reports a 60-70% overall improvement and objectively displays improved ROM, decreased swelling and improved strength. Skilled physical therapy is recommended to continue progressing her plan of care in order to return her to full function with minimal symptoms. PROBLEM LIST (Impacting functional limitations):  1. Pitting edema  2. Decreased ROM  3. Decreased strength  4. Antalgic gait pattern INTERVENTIONS PLANNED:  1. Home Exercise Program (HEP)  2. Manual Therapy  3. Therapeutic Exercise/Strengthening  4. Cryotherapy with vaso-pneumatic compression   GOALS: (Goals have been discussed and agreed upon with patient.)  SHORT-TERM FUNCTIONAL GOALS: Time Frame: 2-4 wks  1. Patient will report 25-50% reduction in overall symptoms (MET)  2. Patient will be compliant with HEP and plan of care (MET)  3. Patient will have 10 deg of active DF (MET)  4. Patient will improve on the LEFS by 3-5 points (MET)  5. Patient will have 25 deg of PF (MET)  DISCHARGE GOALS: Time Frame: 6-8 wk  1.   Patient will report % reduction in overall symptoms in order to be able to have full function with decreased symptoms (ongoing)  2. Patient will report feeling comfortable progressing their own plan of care with the home program prescribed (ongoing)  3. Patient will be able to ambulate with symmetrical gait pattern for 30 minutes with minimal pain (not consistent)  4. Patient will score 60 or greater on the LEFS in order to demonstrate improved function with less pain (ongoing)   5. Patient will be able to perform 5 single leg heel raises on R indicating good achilles/calf strength  (ongoing)     Rehabilitation Potential For Stated Goals: [address] [facility]'s therapy, I certify that the treatment plan above will be carried out by a therapist or under their direction. Thank you for this referral,  Jose Manzanares, PT, DPT     Referring Physician Signature: Chio Heredia MD _______________________________ Date _____________            HISTORY:   History of Present Injury/Illness (Reason for Referral): reports that she initially hurt her achilles in January. She had her surgery in may 15. Three weeks post op she stumble and fell and displaced a stitch. She has since recovered. Numbness is mostly in the ball of the foot. Pain has been doing pretty well in her foot. Past Medical History/Comorbidities: R knee ACL, L knee meniscus   Social History/Living Environment: *  Prior Level of Function/Work/Activity:   Current Medications:       Current Outpatient Medications:     ascorbic acid, vitamin C, (VITAMIN C) 500 mg tablet, Take 1,000 mg by mouth daily. , Disp: , Rfl:     cholecalciferol (VITAMIN D3) 1,000 unit cap, Take 5,000 Units by mouth daily. , Disp: , Rfl:     cyanocobalamin, vitamin B-12, (VITAMIN B-12) 5,000 mcg subl, 5,000 mcg by SubLINGual route daily. , Disp: , Rfl:     OTHER,NON-FORMULARY,, Vit E 180 mg takes daily, Disp: , Rfl:     co-enzyme Q-10 (COQ-10) 100 mg capsule, Take 300 mg by mouth daily. , Disp: , Rfl:     estradiol (CLIMARA) 0.05 mg/24 hr, 1 Patch by TransDERmal route Every Saturday. , Disp: 12 Patch, Rfl: 3    fluticasone (FLONASE) 50 mcg/actuation nasal spray, 2 Sprays by Both Nostrils route daily. , Disp: 3 Bottle, Rfl: 3    levocetirizine (XYZAL) 5 mg tablet, TAKE 1 TABLET BY MOUTH EVERY DAY (Patient taking differently: daily. TAKE 1 TABLET BY MOUTH EVERY DAY  Indications: Allergic Rhinitis), Disp: 90 Tab, Rfl: 1  Date Last Reviewed:9/4/2019     Ambulatory/Rehab Services H2 Model Falls Risk Assessment    Risk Factors:       No Risk Factors Identified Ability to Rise from Chair:       (0)  Ability to rise in a single movement    Falls Prevention Plan:       No modifications necessary   Total: (5 or greater = High Risk): 0    ©2010 [facility] [employer]. All Rights Reserved. [facility] [employer] Patent #[id number].  Federal Law prohibits the replication, distribution or use without written permission from [facility] [employer]      Number of Personal Factors/Comorbidities that affect the Plan of Care: 0: LOW COMPLEXITY   EXAMINATION:   (Abbreviations: P-pain, NP- no pain, wnl-within normal limits)  Observation/Orthostatic Postural Assessment:    Relaxed standing posture:  · Incision has healed well, no drainage or redness noted    Palpation/tone/tissue texture:      Soft tissue:  · Gastroc/soleus/posterior tibialis: increased tone and tightness on R, no atrophy noted  · No edema noted in the foot        Foot Exam Date:  9-4-19       Left Right   Talocrural Joint: wnl Minor hypomobility    Rear foot: (neutral to relaxed: 4-6                      deg-normal) - Mild hypomobility into inversion and eversion    Mid-foot (navicular drop, <7-normal) - Hypomobile (continued)    Forefoot: (position, mobility) - n/a   First ray position/hallux limitus test: About 65 deg About 65 deg   Windlass test: - n/a   Foot intrinsic strength:  - fair       ROM:   Multisegmental ROM Date:  9-4-19 Flexion -   Extension -   Rotation L -   Rotation R -      Ankle ROM Date:  9-4-19       Right Left   DF 10 deg knee straight and bent (harder end feel) 10   PF 35 (harder end feel) 35   Inversion 30 40   Eversion 15 15            Strength: Foot and ankle Strength Date:  9-419       left right   DF n/a 4   PF - 4-   Inversion - 5   Eversion - 5                Special Tests:   Neurological Screen:   Myotomes: -     Dermatomes: decreased sensation in forefoot in plantar and dorsal aspect on R          Reflexes: -         Neural Tension Tests: n/a  Functional Mobility:    · Double leg squat: able to do a functional partial squat  · Gait Pattern: able to ambulate a more symmetrical gait pattern but still has decrease stance time on R   Balance:  Single leg   L: 5 sec  R:5 sec, moderate sway       CLINICAL DECISION MAKING:   Outcome Measure: Tool Used: Lower Extremity Functional Scale (LEFS)  Score:  Initial: 15/80 Most Recent: 34/80 (Date: 9-4-19 )   Interpretation of Score: 20 questions each scored on a 5 point scale with 0 representing \"extreme difficulty or unable to perform\" and 4 representing \"no difficulty\". The lower the score, the greater the functional disability. 80/80 represents no disability. Minimal detectable change is 9 points. Medical Necessity:   · Patient is expected to demonstrate progress in strength, range of motion and symptom levels to return to full function. Reason for Services/Other Comments:  · Patient continues to require skilled intervention due to ongoing symptoms. · Pain: Initial:  2/10 Post Session:  0/10 at rest ·   Compliance with Program/Exercises: Mostly Compliant   · Recommendations/Intent for next treatment session: \"Next visit will focus on progressing the patients plan of care\".     Future Appointments   Date Time Provider Ervin Phillips   9/6/2019  4:15 PM Esthela Villela PT, DPT [facility]   9/9/2019  4:15 PM Esthela Villela PT, MICHELET [facility]     Total Treatment Duration: 60 min, re-eval 5 min  PT Patient Time In/Time Out  Time In: 1401  Time Out: Randy 78 PT, DPT

## 2019-09-04 NOTE — PROGRESS NOTES
Carola Ale  : 1960  Primary: Mary Burn Of [facility]*  Secondary:  Therapy Center at [facility]  [address] , [address], [place] [address]  [phone]   [phone]        OUTPATIENT PHYSICAL THERAPY: Daily Treatment Note  2019       ICD-10: Treatment Diagnosis: ACHILLES TENDONITIS M76.61; DIFFICULTY WALKING, NOT ELSEWHERE CLASSIFIED R26.2     Precautions/Allergies: non reported  Fall Risk Score: 0 (? 5 = High Risk)  MD Orders: evaluate and treat  TREATMENT PLAN:  Effective Dates: 7/10/2019 TO 9/10/2019. Frequency/Duration: 2 times a week for 60 Day(s) MEDICAL/REFERRING DIAGNOSIS:Achilles tendinitis, right leg [M76.61]  DATE OF ONSET: Surgery date 5-15-19  REFERRING PHYSICIAN:Shakila Dinero MD  RETURN PHYSICIAN APPOINTMENT: did not specify        Pre-treatment Symptoms/Complaints: Patient reports that she is doing better. Still feels stiff but about 60-70% overall recovery thus far.   Pain: Initial:  Mild pain reported Post Session: mild pain/soreness reported   Medications Last Reviewed:  2019  Updated Objective Findings: mild redness noted (improved from earlier this week)   TREATMENT:   Therapeutic Exercise: (30min) (Done in order to improve mobility, strength, function and overall understanding of condition)   Date  19 Date  19 Date  19   Activity/Exercise      Education Discussed HEP Discussed HEP Discussed HEP   Bike/ Nu step 10 min 10 min 10 min   DF stretch Knee over foot, 20x  Incline board: 30 sec, 2x Knee over foot, 20x  Incline board: 30 sec, 2x Knee over foot, 20x  Incline board: 30 sec, 2x   isometrics 4 quadrants, 10x, 5 sec hold, max motion 4 quadrants, 10x, 5 sec hold, max motion 4 quadrants, 10 sec hold, 3x   Foot intrinsics   - -   PF · Supine, 20x, full motion  · Sitting,full motion, 20x  · Standin% WB on effected foot, 10x, 3 sets · Supine, 20x, full motion  · Sitting,full motion, 20x  · Standin% WB on effected foot, 10x, 3 sets · Supine, 20x, full motion  · Sitting,full motion, 20x  · Standin% WB on effected foot, 10x, 3 sets   SL stance Working on weight acceptance on R Reviewed  Done with step ups   Step up 10x, 2 sets, 4\" 10x, 3 sets, 4\" w/ contralateral hip flexion 10x, 3 sets, 4\" w/ contralateral hip flexion   Step down   10x, 3 sets, 2\",    Gait training Worked on ambulating with a proper heel strike and toe off, 100 ft Worked on ambulating with a proper heel strike and toe off, 100 ft Reviewed      Manual Therapy: (15 min) (Done to improve mobility, reduce tone, guarding and pain)  ·  Soft tissue mobilization to scar tissue especially along the calcaneous, gastroc and soleus  · Tibiotalar/Talocrural joint PA and AP mobilization, grade III-IV  · calcaneous distraction, medial and lateral mobilization, grade III-IV  · Mid foot mobilization into pronation, done in supine and standing      Modalities:( 15 minutes) (Done to reduce swelling and pain)  · Cryotherapy with vaso-pneumatic compression, low; 34deg; supine w/ LE elevated    Treatment/Session Summary:    · Response to Treatment: patient tolerated the session well. Progressing well with motion and strength. Her gait pattern is better but still has decreased stance time on R LE.  · Communication/Consultation:   · Equipment provided today:  None today  · Recommendations/Intent for next treatment session: Next visit will focus on progressing established plan of care.   Treatment Plan of Care Effective Dates:  19 TO 19  Total Treatment Billable Duration: 60 min  (TE: 30 min,  Manual: 15 min, Vaso 15min), 5 min- re-gucci    PT Patient Time In/Time Out  Time In:   Time Out: 2910    Signed By: Urszula Lantigua, PT, DPT     2019

## 2019-09-06 ENCOUNTER — HOSPITAL ENCOUNTER (OUTPATIENT)
Dept: PHYSICAL THERAPY | Age: 59
Discharge: HOME | End: 2019-09-06
Payer: COMMERCIAL

## 2019-09-06 PROCEDURE — 97016 VASOPNEUMATIC DEVICE THERAPY: CPT

## 2019-09-06 PROCEDURE — 97140 MANUAL THERAPY 1/> REGIONS: CPT

## 2019-09-06 PROCEDURE — 97110 THERAPEUTIC EXERCISES: CPT

## 2019-09-06 NOTE — PROGRESS NOTES
Shanel Shaina  : 1960  Primary: Suri Pollock Of Stephanie Vickjovon*  Secondary:  [facility]  [facility] [address], [place] [address]  [phone]   [phone]        OUTPATIENT PHYSICAL THERAPY: Daily Treatment Note  2019       ICD-10: Treatment Diagnosis: ACHILLES TENDONITIS M76.61; DIFFICULTY WALKING, NOT ELSEWHERE CLASSIFIED R26.2     Precautions/Allergies: non reported  Fall Risk Score: 0 (? 5 = High Risk)  MD Orders: evaluate and treat  TREATMENT PLAN:  Effective Dates: 7/10/2019 TO 9/10/2019. Frequency/Duration: 2 times a week for 60 Day(s) MEDICAL/REFERRING DIAGNOSIS:Achilles tendinitis, right leg [M76.61]  DATE OF ONSET: Surgery date 5-15-19  REFERRING PHYSICIAN:Adrian Dinero MD  RETURN PHYSICIAN APPOINTMENT: did not specify        Pre-treatment Symptoms/Complaints: Patient reports that she felt increased soreness after the last session. States that some of the redness returned.    Pain: Initial:  Mild to moderate pain reported Post Session: reports feeling good after cryotherapy   Medications Last Reviewed:  2019  Updated Objective Findings: mild redness noted (improved from earlier this week)   TREATMENT:   Therapeutic Exercise: (20min) (Done in order to improve mobility, strength, function and overall understanding of condition)   Date  19 Date  19 Date  19   Activity/Exercise      Education Discussed HEP Discussed HEP Discussed HEP   Bike/ Nu step 10 min 10 min 10 min   DF stretch Knee over foot, 20x  Incline board: 30 sec, 2x Knee over foot, 20x  Incline board: 30 sec, 2x Knee over foot, 20x  Incline board: 30 sec, 2x   isometrics 4 quadrants, 10x, 5 sec hold, max motion 4 quadrants, 10 sec hold, 3x 4 quadrants, 10x, 3x   Foot intrinsics  - -    PF · Supine, 20x, full motion  · Sitting,full motion, 20x  · Standin% WB on effected foot, 10x, 3 sets · Supine, 20x, full motion  · Sitting,full motion, 20x  · Standing: 50% WB on effected foot, 10x, 3 sets · Supine, 20x, full motion  · Sitting,full motion, 20x  · Standin% WB on effected foot, 10x, 3 sets   SL stance Reviewed  Done with step ups -   Step up 10x, 3 sets, 4\" w/ contralateral hip flexion 10x, 3 sets, 4\" w/ contralateral hip flexion -   Step down  10x, 3 sets, 2\",  -   Gait training Worked on ambulating with a proper heel strike and toe off, 100 ft Reviewed  -     Manual Therapy: (25 min) (Done to improve mobility, reduce tone, guarding and pain)  ·  Soft tissue mobilization to scar tissue especially along the calcaneous, gastroc and soleus  · Tibiotalar/Talocrural joint PA and AP mobilization, grade III-IV  · calcaneous distraction, medial and lateral mobilization, grade III-IV  · Mid foot mobilization into pronation, done in supine and standing      Modalities:( 10 minutes) (Done to reduce swelling and pain)  · Cryotherapy with vaso-pneumatic compression, low; 34deg; supine w/ LE elevated    Treatment/Session Summary:    · Response to Treatment: patient tolerated the session well. We reduced her treatment intensity from last session in order to ensure she fully recovers. Continuing to work on DF and PF strength. · Communication/Consultation:   · Equipment provided today:  None today  · Recommendations/Intent for next treatment session: Next visit will focus on progressing established plan of care.   Treatment Plan of Care Effective Dates:  19 TO 19  Total Treatment Billable Duration: 60 min  (TE: 20 min,  Manual: 25 min, Vaso 10min)    PT Patient Time In/Time Out  Time In: 0209  Time Out: 3919    Signed By: Hao Emerson, PT, DPT     2019

## 2019-09-09 ENCOUNTER — HOSPITAL ENCOUNTER (OUTPATIENT)
Dept: PHYSICAL THERAPY | Age: 59
Discharge: HOME | End: 2019-09-09
Payer: COMMERCIAL

## 2019-09-09 PROCEDURE — 97016 VASOPNEUMATIC DEVICE THERAPY: CPT

## 2019-09-09 PROCEDURE — 97110 THERAPEUTIC EXERCISES: CPT

## 2019-09-09 PROCEDURE — 97140 MANUAL THERAPY 1/> REGIONS: CPT

## 2019-09-20 ENCOUNTER — HOSPITAL ENCOUNTER (OUTPATIENT)
Dept: PHYSICAL THERAPY | Age: 59
Discharge: HOME | End: 2019-09-20
Payer: COMMERCIAL

## 2019-09-20 PROCEDURE — 97110 THERAPEUTIC EXERCISES: CPT

## 2019-09-20 PROCEDURE — 97016 VASOPNEUMATIC DEVICE THERAPY: CPT

## 2019-09-20 PROCEDURE — 97140 MANUAL THERAPY 1/> REGIONS: CPT

## 2019-09-20 NOTE — PROGRESS NOTES
Jose Ramon Bile  : 1960  Primary: Trudy Eth Of Stephanie Staton*  Secondary:  [facility]  [address] , [address], [place] [address]  [phone]   [phone]        OUTPATIENT PHYSICAL THERAPY: Daily Treatment Note  2019       ICD-10: Treatment Diagnosis: ACHILLES TENDONITIS M76.61; DIFFICULTY WALKING, NOT ELSEWHERE CLASSIFIED R26.2     Precautions/Allergies: non reported  Fall Risk Score: 0 (? 5 = High Risk)  MD Orders: evaluate and treat  TREATMENT PLAN:  Effective Dates: 7/10/2019 TO 9/10/2019. Frequency/Duration: 2 times a week for 60 Day(s) MEDICAL/REFERRING DIAGNOSIS:Achilles tendinitis, right leg [M76.61]  DATE OF ONSET: Surgery date 5-15-19  REFERRING PHYSICIAN:Morgan Dinero MD  RETURN PHYSICIAN APPOINTMENT: did not specify        Pre-treatment Symptoms/Complaints: Patient reports that she is doing better with walking but still suffers from swelling.   Pain: Initial:  Mild pain at rest Post Session: reports feeling good after cryotherapy   Medications Last Reviewed:  2019  Updated Objective Findings: not today   TREATMENT:   Therapeutic Exercise: (25min) (Done in order to improve mobility, strength, function and overall understanding of condition)   Date  19 Date  19 Date  19   Activity/Exercise      Education Discussed HEP Discussed HEP Discussed being more aggressive at home with mobility    Bike/ Nu step 10 min 10 min 10 min   DF stretch Knee over foot, 20x  Incline board: 30 sec, 2x Knee over foot, 20x  Incline board: 30 sec, 2x Knee over foot, 20x  Incline board: 30 sec, 2x   isometrics 4 quadrants, 10x, 3x 4 quadrants, 10x,     Foot intrinsics   HEP    PF · Supine, 20x, full motion  · Sitting,full motion, 20x  · Standin% WB on effected foot, 10x, 3 sets · Supine, 20x, full motion  · Sitting,full motion, 20x  · Standin% WB on effected foot, 10x, 3 sets · Sitting,full motion, 20x  · Standin% WB on effected foot, 10x, 2 sets  · Up on 2, weight shift R, lower on R, 10x   SL stance - Done with step ups Done with step ups   Step up - 10x, 3 sets, 6\", w/ contralateral hip flexion  10x, 3 sets, 4\", w/ contralateral hip flexion    Step down - - -   Gait training - - Reviewed      Manual Therapy: (20 min) (Done to improve mobility, reduce tone, guarding and pain)  ·  Soft tissue mobilization to scar tissue especially along the calcaneous, gastroc and soleus  · Tibiotalar/Talocrural joint PA and AP mobilization, grade III-IV  · calcaneous distraction, medial and lateral mobilization, grade III-IV  · Mid foot mobilization into pronation, done in supine and standing      Modalities:( 15 minutes) (Done to reduce swelling and pain)  · Cryotherapy with vaso-pneumatic compression, low; 34deg; supine w/ LE elevated    Treatment/Session Summary:    · Response to Treatment:Patient tolerated the session well. Closed chain DF continues to be our most limiting factor. We are continuing to work on this as well as progress strength gradually. · Communication/Consultation:   · Equipment provided today:  None today  · Recommendations/Intent for next treatment session: Next visit will focus on progressing established plan of care.   Treatment Plan of Care Effective Dates:  9-4-19 TO 12-4-19  Total Treatment Billable Duration: 60 min  (TE: 25 min,  Manual: 20 min, Vaso 15min)    PT Patient Time In/Time Out  Time In: 1780  Time Out: 5937    Signed By: Purnima Martins PT, DPT     September 20, 2019

## 2019-09-23 ENCOUNTER — HOSPITAL ENCOUNTER (OUTPATIENT)
Dept: PHYSICAL THERAPY | Age: 59
Discharge: HOME | End: 2019-09-23
Payer: COMMERCIAL

## 2019-09-23 PROCEDURE — 97110 THERAPEUTIC EXERCISES: CPT

## 2019-09-23 PROCEDURE — 97140 MANUAL THERAPY 1/> REGIONS: CPT

## 2019-09-23 PROCEDURE — 97016 VASOPNEUMATIC DEVICE THERAPY: CPT

## 2019-09-23 NOTE — PROGRESS NOTES
Mela Rutherford  : 1960  Primary: Cathy Yost Of Stephanie Staton*  Secondary:  [facility]  [address] , [address], [place] [address]  [phone]   [phone]        OUTPATIENT PHYSICAL THERAPY: Daily Treatment Note  2019       ICD-10: Treatment Diagnosis: ACHILLES TENDONITIS M76.61; DIFFICULTY WALKING, NOT ELSEWHERE CLASSIFIED R26.2     Precautions/Allergies: non reported  Fall Risk Score: 0 (? 5 = High Risk)  MD Orders: evaluate and treat  TREATMENT PLAN:  Effective Dates: 7/10/2019 TO 9/10/2019. Frequency/Duration: 2 times a week for 60 Day(s) MEDICAL/REFERRING DIAGNOSIS:Achilles tendinitis, right leg [M76.61]  DATE OF ONSET: Surgery date 5-15-19  REFERRING PHYSICIAN:Darrell Dinero MD  RETURN PHYSICIAN APPOINTMENT: did not specify        Pre-treatment Symptoms/Complaints: Patient reports that she is doing pretty good.  States she bought compression hose that have helped with the swelling  Pain: Initial:  Mild pain at rest Post Session: reports feeling good after cryotherapy   Medications Last Reviewed:  2019  Updated Objective Findings: not today   TREATMENT:   Therapeutic Exercise: (25min) (Done in order to improve mobility, strength, function and overall understanding of condition)   Date  19 Date  19 Date  19   Activity/Exercise      Education Discussed HEP Discussed being more aggressive at home with mobility  Discussed HEP   Bike/ Nu step 10 min 10 min 10 min warm up   DF stretch Knee over foot, 20x  Incline board: 30 sec, 2x Knee over foot, 20x  Incline board: 30 sec, 2x Knee over foot, 20x  Incline board: 30 sec, 2x   isometrics 4 quadrants, 10x,      Foot intrinsics  HEP  HEP   PF · Supine, 20x, full motion  · Sitting,full motion, 20x  · Standin% WB on effected foot, 10x, 3 sets · Sitting,full motion, 20x  · Standin% WB on effected foot, 10x, 2 sets  · Up on 2, weight shift R, lower on R, 10x · Sitting, full motion ,20x  · Up on 2, weight shift R, Lower slowly, 10x, 3 sets   SL stance Done with step ups Done with step ups 5 sec goal, 20x   Step up 10x, 3 sets, 6\", w/ contralateral hip flexion  10x, 3 sets, 4\", w/ contralateral hip flexion  10x, 3 sets, 4\", w/ contralateral hip flexion    Step down - - -   Gait training - Reviewed  Reviewed      Manual Therapy: (20 min) (Done to improve mobility, reduce tone, guarding and pain)  ·  Soft tissue mobilization to scar tissue especially along the calcaneous, gastroc and soleus  · Tibiotalar/Talocrural joint PA and AP mobilization, grade III-IV  · calcaneous distraction and medial mobilization, grade III-IV  · Mid foot mobilization into pronation, done in supine hook lying       Modalities:( 15 minutes) (Done to reduce swelling and pain)  · Cryotherapy with vaso-pneumatic compression, low; 34deg; supine w/ LE elevated    Treatment/Session Summary:    · Response to Treatment:Patient tolerated the session well. Continuing to gradually progress strength and ROM. Her gait pattern is better as well but she still has low endurance and her gait starts to become antalgic when she fatigues. Discussed her home program.   · Communication/Consultation:   · Equipment provided today:  None today  · Recommendations/Intent for next treatment session: Next visit will focus on progressing established plan of care.   Treatment Plan of Care Effective Dates:  9-4-19 TO 12-4-19  Total Treatment Billable Duration: 60 min  (TE: 25 min,  Manual: 20 min, Vaso 15min)    PT Patient Time In/Time Out  Time In: 0798  Time Out: 5478    Signed By: Sanjay Moreno PT, DPT     September 23, 2019

## 2019-09-26 ENCOUNTER — HOSPITAL ENCOUNTER (OUTPATIENT)
Dept: PHYSICAL THERAPY | Age: 59
Discharge: HOME | End: 2019-09-26
Payer: COMMERCIAL

## 2019-09-26 PROCEDURE — 97016 VASOPNEUMATIC DEVICE THERAPY: CPT

## 2019-09-26 PROCEDURE — 97140 MANUAL THERAPY 1/> REGIONS: CPT

## 2019-09-26 PROCEDURE — 97110 THERAPEUTIC EXERCISES: CPT

## 2019-09-26 NOTE — PROGRESS NOTES
Bran Forrester  : 1960  Primary: Joleen Carter Of Stephanie Staton*  [facility]:  [facility], [address], [place] [address]  [phone]   [phone]        OUTPATIENT PHYSICAL THERAPY: Daily Treatment Note  2019       ICD-10: Treatment Diagnosis: ACHILLES TENDONITIS M76.61; DIFFICULTY WALKING, NOT ELSEWHERE CLASSIFIED R26.2     Precautions/Allergies: non reported  Fall Risk Score: 0 (? 5 = High Risk)  MD Orders: evaluate and treat  TREATMENT PLAN:  Effective Dates: 7/10/2019 TO 9/10/2019. Frequency/Duration: 2 times a week for 60 Day(s) MEDICAL/REFERRING DIAGNOSIS:Achilles tendinitis, right leg [M76.61]  DATE OF ONSET: Surgery date 5-15-19  REFERRING PHYSICIAN:Rajwinder Dinero MD  RETURN PHYSICIAN APPOINTMENT: did not specify        Pre-treatment Symptoms/Complaints: Patient reports that she is doing pretty good. States she still has soreness/ pain in the heel but not as bad as before.   Pain: Initial:  Mild pain at rest Post Session: reports feeling good after cryotherapy   Medications Last Reviewed:  2019  Updated Objective Findings: not today   TREATMENT:   Therapeutic Exercise: (25min) (Done in order to improve mobility, strength, function and overall understanding of condition)   Date  19 Date  19 Date  19   Activity/Exercise      Education Discussed being more aggressive at home with mobility  Discussed HEP Discussed HEP   Bike/ Nu step 10 min 10 min warm up 10 min warm up   DF stretch Knee over foot, 20x  Incline board: 30 sec, 2x Knee over foot, 20x  Incline board: 30 sec, 2x Knee over foot, 20x  Incline board: 30 sec, 2x   isometrics   -   Foot intrinsics   HEP HEP   PF · Sitting,full motion, 20x  · Standin% WB on effected foot, 10x, 2 sets  · Up on 2, weight shift R, lower on R, 10x · Sitting, full motion ,20x  · Up on 2, weight shift R, Lower slowly, 10x, 3 sets · Sitting, full motion ,20x  · Up on 2, weight shift R, Lower slowly, 10x, 3 sets   SL stance Done with step ups 5 sec goal, 20x 5 sec goal, 25x   Step down   2\", 10x, 3 sets, ipsilaterla support   Step up 10x, 3 sets, 4\", w/ contralateral hip flexion  10x, 3 sets, 4\", w/ contralateral hip flexion  -   Gait training Reviewed  Reviewed       Manual Therapy: (20 min) (Done to improve mobility, reduce tone, guarding and pain)  ·  Soft tissue mobilization to scar tissue especially along the calcaneous, gastroc and soleus  · Tibiotalar/Talocrural joint PA and AP mobilization, grade III-IV  · calcaneous distraction and medial mobilization, grade III-IV  · Mid foot mobilization into pronation, done in supine hook lying       Modalities:( 15 minutes) (Done to reduce swelling and pain)  · Cryotherapy with vaso-pneumatic compression, low; 34deg; supine w/ LE elevated    Treatment/Session Summary:    · Response to Treatment:Patient tolerated the session well. She is gradually progressing with PF strength and was abl to tolerate step downs for the first time today which is a good sign of progression. Discussed her home program and plan of care  · Communication/Consultation:   · Equipment provided today:  None today  · Recommendations/Intent for next treatment session: Next visit will focus on progressing established plan of care.   Treatment Plan of Care Effective Dates:  9-4-19 TO 12-4-19  Total Treatment Billable Duration: 60 min  (TE: 25 min,  Manual: 20 min, Vaso 15min)    PT Patient Time In/Time Out  Time In: 8883  Time Out: 5709    Signed By: Robert Luther, PT, DPT     September 26, 2019

## 2019-09-30 ENCOUNTER — HOSPITAL ENCOUNTER (OUTPATIENT)
Dept: PHYSICAL THERAPY | Age: 59
Discharge: HOME | End: 2019-09-30
Payer: COMMERCIAL

## 2019-09-30 PROCEDURE — 97110 THERAPEUTIC EXERCISES: CPT

## 2019-09-30 PROCEDURE — 97140 MANUAL THERAPY 1/> REGIONS: CPT

## 2019-09-30 PROCEDURE — 97016 VASOPNEUMATIC DEVICE THERAPY: CPT

## 2019-09-30 NOTE — PROGRESS NOTES
James Law  : 1960  Primary: Jinnyjaxson Polo Of Stephanie Staton*  Secondary:  [facility]  [address] , [address], [place] [address]  [phone]   [phone]        OUTPATIENT PHYSICAL THERAPY: Daily Treatment Note  2019       ICD-10: Treatment Diagnosis: ACHILLES TENDONITIS M76.61; DIFFICULTY WALKING, NOT ELSEWHERE CLASSIFIED R26.2     Precautions/Allergies: non reported  Fall Risk Score: 0 (? 5 = High Risk)  MD Orders: evaluate and treat  TREATMENT PLAN:  Effective Dates: 7/10/2019 TO 9/10/2019. Frequency/Duration: 2 times a week for 60 Day(s) MEDICAL/REFERRING DIAGNOSIS:Achilles tendinitis, right leg [M76.61]  DATE OF ONSET: Surgery date 5-15-19  REFERRING PHYSICIAN:Dione Dinero MD  RETURN PHYSICIAN APPOINTMENT: did not specify        Pre-treatment Symptoms/Complaints: Patient reports that she started wearing an ankle brace instead of the compression garment and thinks that may be more helpful.    Pain: Initial:  Mild pain at rest Post Session: reports minimal pain   Medications Last Reviewed:  2019  Updated Objective Findings: not today   TREATMENT:   Therapeutic Exercise: (30min) (Done in order to improve mobility, strength, function and overall understanding of condition)   Date  19 Date  19 Date  19   Activity/Exercise      Education Discussed HEP Discussed HEP Discussed HEP   Bike/ Nu step 10 min warm up 10 min warm up 10 min warm up   DF stretch Knee over foot, 20x  Incline board: 30 sec, 2x Knee over foot, 20x  Incline board: 30 sec, 2x Knee over foot, 20x  Incline board: 30 sec, 2x   Foot intrinsics  HEP HEP    PF · Sitting, full motion ,20x  · Up on 2, weight shift R, Lower slowly, 10x, 3 sets · Sitting, full motion ,20x  · Up on 2, weight shift R, Lower slowly, 10x, 3 sets · Sitting, full motion ,20x  · Up on 2, weight shift R, Lower slowly, 10x, 3 sets   SL stance 5 sec goal, 20x 5 sec goal, 5x 5 sec goal, 10x Step down  2\", 10x, 3 sets, ipsilateral support 2\", 10x, 3 sets, ipsilateral support   Step up 10x, 3 sets, 4\", w/ contralateral hip flexion  - -   Gait training Reviewed   discussed     Manual Therapy: (20 min) (Done to improve mobility, reduce tone, guarding and pain)  ·  Soft tissue mobilization to scar tissue especially along the calcaneous, achilles tendon distraaction, gastroc and soleus  · Tibiotalar/Talocrural joint PA and AP mobilization, grade III-IV  · calcaneous distraction and medial mobilization, grade III-IV   · Mid foot mobilization into pronation, done in supine hook lying       Modalities:( 15 minutes) (Done to reduce swelling and pain)  · Cryotherapy with vaso-pneumatic compression, low; 34deg; supine w/ LE elevated    Treatment/Session Summary:    · Response to Treatment:Patient tolerated the session well. She demonstrated improved DF at initial presentation today indicating she has kept her improvements from last session. Strength is progressing. · Communication/Consultation:   · Equipment provided today:  None today  · Recommendations/Intent for next treatment session: Next visit will focus on progressing established plan of care.   Treatment Plan of Care Effective Dates:  9-4-19 TO 12-4-19  Total Treatment Billable Duration: 60 min  (TE:30min,  Manual: 20min, Vaso 15min)    PT Patient Time In/Time Out  Time In: 6934  Time Out: 9106    Signed By: Perfecto Fonseca, PT, DPT     September 30, 2019

## 2019-10-03 ENCOUNTER — HOSPITAL ENCOUNTER (OUTPATIENT)
Dept: PHYSICAL THERAPY | Age: 59
Discharge: HOME | End: 2019-10-03
Payer: COMMERCIAL

## 2019-10-03 PROCEDURE — 97110 THERAPEUTIC EXERCISES: CPT

## 2019-10-03 PROCEDURE — 97140 MANUAL THERAPY 1/> REGIONS: CPT

## 2019-10-03 PROCEDURE — 97016 VASOPNEUMATIC DEVICE THERAPY: CPT

## 2019-10-03 NOTE — PROGRESS NOTES
Pedro Zhang  : 1960  Primary: Rashid Molina Of Stephanie Staton*  Secondary:  [facility]  [address] , [address], [place] [address]  [phone]   [phone]        OUTPATIENT PHYSICAL THERAPY: Daily Treatment Note  10/4/2019       ICD-10: Treatment Diagnosis: ACHILLES TENDONITIS M76.61; DIFFICULTY WALKING, NOT ELSEWHERE CLASSIFIED R26.2     Precautions/Allergies: non reported  Fall Risk Score: 0 (? 5 = High Risk)  MD Orders: evaluate and treat  TREATMENT PLAN:  Effective Dates: 7/10/2019 TO 9/10/2019. Frequency/Duration: 2 times a week for 60 Day(s) MEDICAL/REFERRING DIAGNOSIS:Achilles tendinitis, right leg [M76.61]  DATE OF ONSET: Surgery date 5-15-19  REFERRING PHYSICIAN:Isha Dinero MD  RETURN PHYSICIAN APPOINTMENT: did not specify        Pre-treatment Symptoms/Complaints: Patient reports that her foot is tired and sore today.  She has been on it for more than usual.   Pain: Initial:  Mild pain at rest Post Session: reports minimal pain   Medications Last Reviewed:  10/4/2019  Updated Objective Findings: not today   TREATMENT:   Therapeutic Exercise: (30min) (Done in order to improve mobility, strength, function and overall understanding of condition)   Date  19 Date  19 Date  10-3-19   Activity/Exercise      Education Discussed HEP Discussed HEP Discussed HEP   Bike/ Nu step 10 min warm up 10 min warm up 10 min warm up   DF stretch Knee over foot, 20x  Incline board: 30 sec, 2x Knee over foot, 20x  Incline board: 30 sec, 2x Knee over foot, 20x  Incline board: 30 sec, 2x   Foot intrinsics  HEP     PF · Sitting, full motion ,20x  · Up on 2, weight shift R, Lower slowly, 10x, 3 sets · Sitting, full motion ,20x  · Up on 2, weight shift R, Lower slowly, 10x, 3 sets · Sitting, full motion ,20x  · Up on 2, weight shift R, Lower slowly, 10x, 3 sets   SL stance 5 sec goal, 5x 5 sec goal, 10x 5 sec goal, 10x   Step down 2\", 10x, 3 sets, ipsilateral support 2\", 10x, 3 sets, ipsilateral support    Step up - - 2\", 10x, 2 sets, double support as needed, started from a max DF position    Gait training  discussed      Manual Therapy: (20 min) (Done to improve mobility, reduce tone, guarding and pain)  ·  Soft tissue mobilization to scar tissue especially along the calcaneous, achilles tendon distraaction, gastroc and soleus  · Tibiotalar/Talocrural joint PA and AP mobilization, grade III-IV  · calcaneous distraction and medial mobilization, grade III-IV   · Mid foot mobilization into pronation, done in supine hook lying       Modalities:( 15 minutes) (Done to reduce swelling and pain)  · Cryotherapy with vaso-pneumatic compression, low; 34deg; supine w/ LE elevated    Treatment/Session Summary:    · Response to Treatment:Patient tolerated the session well. She still has tightness into DF that we are continuing to work on. She was recommended swimming but she does not want to get into the water. Also continuing her to encourage to continue pushing herself more at home. · Communication/Consultation:   · Equipment provided today:  None today  · Recommendations/Intent for next treatment session: Next visit will focus on progressing established plan of care.   Treatment Plan of Care Effective Dates:  9-4-19 TO 12-4-19  Total Treatment Billable Duration: 60 min  (TE:30min,  Manual: 20min, Vaso 15min)    PT Patient Time In/Time Out  Time In: 9910  Time Out: 1933    Signed By: General Mera PT, DPT     October 4, 2019

## 2019-10-07 ENCOUNTER — HOSPITAL ENCOUNTER (OUTPATIENT)
Dept: PHYSICAL THERAPY | Age: 59
Discharge: HOME | End: 2019-10-07
Payer: COMMERCIAL

## 2019-10-07 PROCEDURE — 97140 MANUAL THERAPY 1/> REGIONS: CPT

## 2019-10-07 PROCEDURE — 97110 THERAPEUTIC EXERCISES: CPT

## 2019-10-07 PROCEDURE — 97016 VASOPNEUMATIC DEVICE THERAPY: CPT

## 2019-10-07 NOTE — PROGRESS NOTES
Mac Joselin  : 1960  Primary: Lydia Kay Of Stephanie Staton*  Secondary:  [facility] at [facility]  [address] , [address], [place] [address]  [phone]   [phone]        OUTPATIENT PHYSICAL THERAPY: Daily Treatment Note  10/8/2019       ICD-10: Treatment Diagnosis: ACHILLES TENDONITIS M76.61; DIFFICULTY WALKING, NOT ELSEWHERE CLASSIFIED R26.2     Precautions/Allergies: non reported  Fall Risk Score: 0 (? 5 = High Risk)  MD Orders: evaluate and treat  TREATMENT PLAN:  Effective Dates: 7/10/2019 TO 9/10/2019. Frequency/Duration: 2 times a week for 60 Day(s) MEDICAL/REFERRING DIAGNOSIS:Achilles tendinitis, right leg [M76.61]  DATE OF ONSET: Surgery date 5-15-19  REFERRING PHYSICIAN:Jennifer Dinero MD  RETURN PHYSICIAN APPOINTMENT: did not specify        Pre-treatment Symptoms/Complaints: Patient reports that she is doing pretty good today but has an ingrown toe nail that is bothering her.   Pain: Initial:  Mild pain at rest Post Session: reports minimal pain   Medications Last Reviewed:  10/8/2019  Updated Objective Findings: not today   TREATMENT:   Therapeutic Exercise: (30min) (Done in order to improve mobility, strength, function and overall understanding of condition)   Date  19 Date  10-3-19 Date  10-7-19   Activity/Exercise      Education Discussed HEP Discussed HEP Discussed HEP   Bike/ Nu step 10 min warm up 10 min warm up 10 min warm up   DF stretch Knee over foot, 20x  Incline board: 30 sec, 2x Knee over foot, 20x  Incline board: 30 sec, 2x Knee over foot, 20x  Incline board: 30 sec, 2x   Foot intrinsics       PF · Sitting, full motion ,20x  · Up on 2, weight shift R, Lower slowly, 10x, 3 sets · Sitting, full motion ,20x  · Up on 2, weight shift R, Lower slowly, 10x, 3 sets · Sitting, full motion, 10x, end range hold, 10 sec  · Up on 2, weight shift R, Lower slowly, 10x, 3 sets   SL stance 5 sec goal, 10x 5 sec goal, 10x 5 sec goal, contralateral hip flex/exten   Step down 2\", 10x, 3 sets, ipsilateral support     Step up - 2\", 10x, 2 sets, double support as needed, started from a max DF position  4\", 10x, 2 sets, double support as needed, started from a max DF position    Gait training discussed       Manual Therapy: (20 min) (Done to improve mobility, reduce tone, guarding and pain)  ·  Soft tissue mobilization to scar tissue especially along the calcaneous, achilles tendon distraaction, gastroc and soleus  · Tibiotalar/Talocrural joint PA and AP mobilization, grade III-IV  · calcaneous distraction and medial mobilization, grade III-IV   · Mid foot mobilization into pronation, done in supine hook lying       Modalities:( 10 minutes) (Done to reduce swelling and pain)  · Cryotherapy with vaso-pneumatic compression, low; 34deg; supine w/ LE elevated    Treatment/Session Summary:    · Response to Treatment:Patient tolerated the session well. Progressing with DF mobility and is getting stronger in her Plantarflexors but needs to continue to push it harder at home. Discussed her home program.    · Communication/Consultation:   · Equipment provided today:  None today  · Recommendations/Intent for next treatment session: Next visit will focus on progressing established plan of care.   Treatment Plan of Care Effective Dates:  9-4-19 TO 12-4-19  Total Treatment Billable Duration: 60 min  (TE:30min,  Manual: 20min, Vaso 10min)    PT Patient Time In/Time Out  Time In: 3705  Time Out: 1505    Signed By: Perfecto Fonseca, PT, DPT     October 8, 2019

## 2019-10-10 ENCOUNTER — HOSPITAL ENCOUNTER (OUTPATIENT)
Dept: PHYSICAL THERAPY | Age: 59
Discharge: HOME | End: 2019-10-10
Payer: COMMERCIAL

## 2019-10-10 PROCEDURE — 97016 VASOPNEUMATIC DEVICE THERAPY: CPT

## 2019-10-10 PROCEDURE — 97110 THERAPEUTIC EXERCISES: CPT

## 2019-10-10 PROCEDURE — 97140 MANUAL THERAPY 1/> REGIONS: CPT

## 2019-10-10 NOTE — PROGRESS NOTES
Parra Jw  : 1960  Primary: Jacky Orozco Of Stephnaie Staton*  [facility]  [facility] [address] [place] [address]  [phone]   [phone]        OUTPATIENT PHYSICAL THERAPY: Daily Treatment Note  10/10/2019       ICD-10: Treatment Diagnosis: ACHILLES TENDONITIS M76.61; DIFFICULTY WALKING, NOT ELSEWHERE CLASSIFIED R26.2     Precautions/Allergies: non reported  Fall Risk Score: 0 (? 5 = High Risk)  MD Orders: evaluate and treat  TREATMENT PLAN:  Effective Dates: 7/10/2019 TO 9/10/2019. Frequency/Duration: 2 times a week for 60 Day(s) MEDICAL/REFERRING DIAGNOSIS:Achilles tendinitis, right leg [M76.61]  DATE OF ONSET: Surgery date 5-15-19  REFERRING PHYSICIAN:Shelby Dinero MD  RETURN PHYSICIAN APPOINTMENT: did not specify        Pre-treatment Symptoms/Complaints: Patient reports that she is doing better.  States that she is working   Pain: Initial:  Mild pain at rest Post Session: reports minimal pain   Medications Last Reviewed:  10/10/2019  Updated Objective Findings: not today   TREATMENT:   Therapeutic Exercise: (30min) (Done in order to improve mobility, strength, function and overall understanding of condition)   Date  10-3-19 Date  10-7-19 Date  10-10-19   Activity/Exercise      Education Discussed HEP Discussed HEP Discussed HEP   Bike/ Nu step 10 min warm up 10 min warm up 10 min warm up   DF stretch Knee over foot, 20x  Incline board: 30 sec, 2x Knee over foot, 20x  Incline board: 30 sec, 2x 60 sec, incline board   Foot intrinsics       PF · Sitting, full motion ,20x  · Up on 2, weight shift R, Lower slowly, 10x, 3 sets · Sitting, full motion, 10x, end range hold, 10 sec  · Up on 2, weight shift R, Lower slowly, 10x, 3 sets · Sitting, full motion, 10x, end range hold, 10 sec  · Up on 2, weight shift R, Lower slowly, 10x, 3 sets   SL stance 5 sec goal, 10x 5 sec goal, contralateral hip flex/exten Step over, 20x, 3 sets   Step down   2\", 10,x 2 sets   Step up 2\", 10x, 2 sets, double support as needed, started from a max DF position  4\", 10x, 2 sets, double support as needed, started from a max DF position  4\", 10x, 2 sets, double support as needed, started from a max DF position    Gait training   -     Manual Therapy: (20 min) (Done to improve mobility, reduce tone, guarding and pain)  ·  Soft tissue mobilization to scar tissue especially along the calcaneous, achilles tendon distraaction, gastroc and soleus  · Tibiotalar/Talocrural joint PA and AP mobilization, grade III-IV  · calcaneous distraction and medial mobilization, grade III-IV   · Mid foot mobilization into pronation, done in supine hook lying       Modalities:( 10 minutes) (Done to reduce swelling and pain)  · Cryotherapy with vaso-pneumatic compression, low; 34deg; supine w/ LE elevated    Treatment/Session Summary:    · Response to Treatment:Patient tolerated the session well. Progressing gradually with strength and ROM. Able to tolerate step downs today without her shoes which is a good sign of improvement. · Communication/Consultation:   · Equipment provided today:  None today  · Recommendations/Intent for next treatment session: Next visit will focus on progressing established plan of care.   Treatment Plan of Care Effective Dates:  9-4-19 TO 12-4-19  Total Treatment Billable Duration: 60 min  (TE:30min,  Manual: 20min, Vaso 10min)    PT Patient Time In/Time Out  Time In: 1999  Time Out: 6768    Signed By: Radha Molina, PT, DPT     October 10, 2019

## 2019-10-14 ENCOUNTER — HOSPITAL ENCOUNTER (OUTPATIENT)
Dept: PHYSICAL THERAPY | Age: 59
Discharge: HOME | End: 2019-10-14
Payer: COMMERCIAL

## 2019-10-14 PROCEDURE — 97016 VASOPNEUMATIC DEVICE THERAPY: CPT

## 2019-10-14 PROCEDURE — 97140 MANUAL THERAPY 1/> REGIONS: CPT

## 2019-10-14 PROCEDURE — 97110 THERAPEUTIC EXERCISES: CPT

## 2019-10-14 NOTE — PROGRESS NOTES
Pickett   : 1960  Primary: Marlin Perez Of Stephanie Staton*  Secondary:  [facility]  [address] , [address], [place] [address]  [phone]   [phone]        OUTPATIENT PHYSICAL THERAPY: Daily Treatment Note  10/14/2019       ICD-10: Treatment Diagnosis: ACHILLES TENDONITIS M76.61; DIFFICULTY WALKING, NOT ELSEWHERE CLASSIFIED R26.2     Precautions/Allergies: non reported  Fall Risk Score: 0 (? 5 = High Risk)  MD Orders: evaluate and treat  TREATMENT PLAN:  Effective Dates:2019 TO 2019. Frequency/Duration: 2 times a week for 60 Day(s) MEDICAL/REFERRING DIAGNOSIS:Achilles tendinitis, right leg [M76.61]  DATE OF ONSET: Surgery date 5-15-19  REFERRING PHYSICIAN:Wandy Dinero MD  RETURN PHYSICIAN APPOINTMENT: did not specify        Pre-treatment Symptoms/Complaints: Patient reports that she saw Dr. Jenny Marie who said that she was doing well and to continue with therapy.   Pain: Initial:  Mild pain at rest Post Session: reports minimal pain   Medications Last Reviewed:  10/14/2019  Updated Objective Findings: not today   TREATMENT:   Therapeutic Exercise: (30min) (Done in order to improve mobility, strength, function and overall understanding of condition)   Date  10-7-19 Date  10-10-19 Date  10-14-19   Activity/Exercise      Education Discussed HEP Discussed HEP Discussed HEP   Bike/ Nu step 10 min warm up 10 min warm up 10 min warm up   DF stretch Knee over foot, 20x  Incline board: 30 sec, 2x 60 sec, incline board 60 sec, incline board   Foot intrinsics    HEP   PF · Sitting, full motion, 10x, end range hold, 10 sec  · Up on 2, weight shift R, Lower slowly, 10x, 3 sets · Sitting, full motion, 10x, end range hold, 10 sec  · Up on 2, weight shift R, Lower slowly, 10x, 3 sets · Sittinx full motion  · Up on 2, weight shift R, Lower slowly, 10x  · L LE propped on step, SL raise, 10x, 2 sets   SL stance 5 sec goal, contralateral hip flex/exten Step over, 20x, 3 sets Step over, 20x, 2 sets   Step down  2\", 10,x 2 sets 2\", 10x, 2 sets   Step up 4\", 10x, 2 sets, double support as needed, started from a max DF position  4\", 10x, 2 sets, double support as needed, started from a max DF position  4\", 10x, 2 sets, double support as needed, started from a max DF position    Gait training  -      Manual Therapy: (20 min) (Done to improve mobility, reduce tone, guarding and pain)  ·  Soft tissue mobilization to scar tissue especially along the calcaneous, achilles tendon distraaction, gastroc and soleus  · Tibiotalar/Talocrural joint PA and AP mobilization, grade III-IV  · calcaneous distraction and medial mobilization, grade III-IV   · Mid foot mobilization into pronation, done in supine hook lying       Modalities:( 10 minutes) (Done to reduce swelling and pain)  · Cryotherapy with vaso-pneumatic compression, low; 34deg; supine w/ LE elevated    Treatment/Session Summary:    · Response to Treatment:Patient tolerated the session well. Able to progress with achilles strengthening today. Discussed to continue working hard at home. · Communication/Consultation:   · Equipment provided today:  None today  · Recommendations/Intent for next treatment session: Next visit will focus on progressing established plan of care.   Treatment Plan of Care Effective Dates:  9-4-19 TO 12-4-19  Total Treatment Billable Duration: 60 min  (TE:30min,  Manual: 20min, Vaso 10min)    PT Patient Time In/Time Out  Time In: 2972  Time Out: 5340    Signed By: Paula Santos, PT, DPT     October 14, 2019

## 2019-10-17 ENCOUNTER — APPOINTMENT (OUTPATIENT)
Dept: PHYSICAL THERAPY | Age: 59
End: 2019-10-17
Payer: COMMERCIAL

## 2019-10-18 ENCOUNTER — HOSPITAL ENCOUNTER (OUTPATIENT)
Dept: PHYSICAL THERAPY | Age: 59
Discharge: HOME | End: 2019-10-18
Payer: COMMERCIAL

## 2019-10-18 PROCEDURE — 97140 MANUAL THERAPY 1/> REGIONS: CPT

## 2019-10-18 PROCEDURE — 97016 VASOPNEUMATIC DEVICE THERAPY: CPT

## 2019-10-18 PROCEDURE — 97110 THERAPEUTIC EXERCISES: CPT

## 2019-10-18 NOTE — PROGRESS NOTES
Yamilex Grullon  : 1960  Primary: Laurence Bunn Of Stephanie Staton*  Secondary:  [facility]  [facility] [address], [place] [address]  [phone]   [phone]        OUTPATIENT PHYSICAL THERAPY: Daily Treatment Note  10/18/2019       ICD-10: Treatment Diagnosis: ACHILLES TENDONITIS M76.61; DIFFICULTY WALKING, NOT ELSEWHERE CLASSIFIED R26.2     Precautions/Allergies: non reported  Fall Risk Score: 0 (? 5 = High Risk)  MD Orders: evaluate and treat  TREATMENT PLAN:  Effective Dates:2019 TO 2019. Frequency/Duration: 2 times a week for 60 Day(s) MEDICAL/REFERRING DIAGNOSIS:Achilles tendinitis, right leg [M76.61]  DATE OF ONSET: Surgery date 5-15-19  REFERRING PHYSICIAN:Tollison, Fredrich Severe, MD  RETURN PHYSICIAN APPOINTMENT: did not specify        Pre-treatment Symptoms/Complaints: Patient reports that she is doing well. Started walking barefoot more at home.   Pain: Initial:  Mild pain at rest Post Session: reports feeling pretty good   Medications Last Reviewed:  10/18/2019  Updated Objective Findings: not today   TREATMENT:   Therapeutic Exercise: (30min) (Done in order to improve mobility, strength, function and overall understanding of condition)   Date  10-10-19 Date  10-14-19 Date  10-18-19   Activity/Exercise      Education Discussed HEP Discussed HEP Discussed HEP   Bike/ Nu step 10 min warm up 10 min warm up 10 min warm up   DF stretch 60 sec, incline board 60 sec, incline board 60 sec, incline board   Foot intrinsics   HEP HEP   PF · Sitting, full motion, 10x, end range hold, 10 sec  · Up on 2, weight shift R, Lower slowly, 10x, 3 sets · Sittinx full motion  · Up on 2, weight shift R, Lower slowly, 10x  · L LE propped on step, SL raise, 10x, 2 sets · Sittinx full motion  · Up on 2, weight shift R, Lower slowly, 10x  · L LE propped on step, SL raise, 10x, 2 sets   SL stance Step over, 20x, 3 sets Step over, 20x, 2 sets Step over, 20x Step down 2\", 10,x 2 sets 2\", 10x, 2 sets 2\", 10x, 3 sets   Step up 4\", 10x, 2 sets, double support as needed, started from a max DF position  4\", 10x, 2 sets, double support as needed, started from a max DF position  4\", 10x, 3sets, ipsilateral support only   Gait training -  -     Manual Therapy: (20 min) (Done to improve mobility, reduce tone, guarding and pain)  ·  Soft tissue mobilization to scar tissue especially along the calcaneous, achilles tendon distraaction, gastroc and soleus  · Tibiotalar/Talocrural joint PA and AP mobilization, grade III-IV  · calcaneous distraction and medial mobilization, grade III-IV   · Mid foot mobilization into pronation, done in supine hook lying       Modalities:( 10 minutes) (Done to reduce swelling and pain)  · Cryotherapy with vaso-pneumatic compression, medium compression; 34deg; supine w/ LE elevated    Treatment/Session Summary:    · Response to Treatment:Patient tolerated the session well. Progressing gradually with strength and ROM. Tolerating weightbearing without shoes better but . · Communication/Consultation:   · Equipment provided today:  None today  · Recommendations/Intent for next treatment session: Next visit will focus on progressing established plan of care.   Treatment Plan of Care Effective Dates:  9-4-19 TO 12-4-19  Total Treatment Billable Duration: 60 min  (TE:30min,  Manual: 20min, Vaso 10min)    PT Patient Time In/Time Out  Time In: 9255  Time Out: 9524    Signed By: Ashlyn Barba PT, DPT     October 18, 2019

## 2019-11-01 ENCOUNTER — HOSPITAL ENCOUNTER (OUTPATIENT)
Dept: PHYSICAL THERAPY | Age: 59
Discharge: HOME | End: 2019-11-01
Payer: COMMERCIAL

## 2019-11-01 PROCEDURE — 97016 VASOPNEUMATIC DEVICE THERAPY: CPT

## 2019-11-01 PROCEDURE — 97110 THERAPEUTIC EXERCISES: CPT

## 2019-11-01 PROCEDURE — 97140 MANUAL THERAPY 1/> REGIONS: CPT

## 2019-11-01 NOTE — PROGRESS NOTES
Yamilex Hu  : 1960  Primary: Laurence Bunn Of Stephanie Staton*  Secondary:  [facility] at [facility]  [address] , [address], [place] [address]  [phone]   [phone]        OUTPATIENT PHYSICAL THERAPY: Daily Treatment Note  2019   PROGRESS NOTE NEXT VISIT    ICD-10: Treatment Diagnosis: ACHILLES TENDONITIS M76.61; DIFFICULTY WALKING, NOT ELSEWHERE CLASSIFIED R26.2     Precautions/Allergies: non reported  Fall Risk Score: 0 (? 5 = High Risk)  MD Orders: evaluate and treat  TREATMENT PLAN:  Effective Dates:2019 TO 2019. Frequency/Duration: 2 times a week for 60 Day(s) MEDICAL/REFERRING DIAGNOSIS:Achilles tendinitis, right leg [M76.61]  DATE OF ONSET: Surgery date 5-15-19  REFERRING PHYSICIAN:Tollison, Fredrich Severe, MD  RETURN PHYSICIAN APPOINTMENT: did not specify        Pre-treatment Symptoms/Complaints: Patient reports that she walked a lot in the mud while she was in [place] because of all the rain they had there. This made her ankle sore.    Pain: Initial:  Mild pain at rest Post Session: reports feeling pretty good   Medications Last Reviewed:  2019  Updated Objective Findings: not today   TREATMENT:   Therapeutic Exercise: (30min) (Done in order to improve mobility, strength, function and overall understanding of condition)   Date  10-14-19 Date  10-18-19 Date  19   Activity/Exercise      Education Discussed HEP Discussed HEP Discussed HEP   Bike/ Nu step 10 min warm up 10 min warm up 10 min warm up   DF stretch 60 sec, incline board 60 sec, incline board 60 sec, incline board, 2x   Foot intrinsics  HEP HEP    PF · Sittinx full motion  · Up on 2, weight shift R, Lower slowly, 10x  · L LE propped on step, SL raise, 10x, 2 sets · Sittinx full motion  · Up on 2, weight shift R, Lower slowly, 10x  · L LE propped on step, SL raise, 10x, 2 sets · Sittinx full motion  · Up on 2, weight shift R, Lower slowly, 10x  · L LE propped on step, SL raise, 10x, 2 sets   SL stance Step over, 20x, 2 sets Step over, 20x Step over, 20x   Step down 2\", 10x, 2 sets 2\", 10x, 3 sets -   Step up 4\", 10x, 2 sets, double support as needed, started from a max DF position  4\", 10x, 3sets, ipsilateral support only 4\", 10x, 3 sets   Gait training  - -     Manual Therapy: (20 min) (Done to improve mobility, reduce tone, guarding and pain)  ·  Soft tissue mobilization to scar tissue especially along the calcaneous, achilles tendon distraaction, gastroc and soleus  · Tibiotalar/Talocrural joint PA and AP mobilization, grade III-IV  · calcaneous distraction and medial mobilization, grade III-IV   · Mid foot mobilization into pronation, done in supine hook lying       Modalities:( 10 minutes) (Done to reduce swelling and pain)  · Cryotherapy with vaso-pneumatic compression, medium compression; 34deg; supine w/ LE elevated    Treatment/Session Summary:    · Response to Treatment:Patient tolerated the session well. Her motion was actually pretty good today despite not being seen for over a week. We are continuing to progress strength and balance. · Communication/Consultation:   · Equipment provided today:  None today  · Recommendations/Intent for next treatment session: Next visit will focus on progressing established plan of care.   Treatment Plan of Care Effective Dates:  9-4-19 TO 12-4-19  Total Treatment Billable Duration: 60 min  (TE:30min,  Manual: 20min, Vaso 10min)    PT Patient Time In/Time Out  Time In: 5298  Time Out: 4760    Signed By: Suzy Kendrick, PT, DPT     November 1, 2019

## 2019-11-04 ENCOUNTER — HOSPITAL ENCOUNTER (OUTPATIENT)
Dept: PHYSICAL THERAPY | Age: 59
Discharge: HOME | End: 2019-11-04
Payer: COMMERCIAL

## 2019-11-04 PROCEDURE — 97016 VASOPNEUMATIC DEVICE THERAPY: CPT

## 2019-11-04 PROCEDURE — 97140 MANUAL THERAPY 1/> REGIONS: CPT

## 2019-11-04 PROCEDURE — 97110 THERAPEUTIC EXERCISES: CPT

## 2019-11-04 NOTE — THERAPY RECERTIFICATION
Cadence Patella  : 1960  Primary: Cory Melo Of [facility]*  Secondary:  [facility] [place] [address], [address], [place] [address]  [phone]   [phone]         OUTPATIENT PHYSICAL THERAPY:Recertification     ICD-10: Treatment Diagnosis: ACHILLES TENDONITIS M76.61; DIFFICULTY WALKING, NOT ELSEWHERE CLASSIFIED R26.2    Precautions/Allergies: non reported  Fall Risk Score: 0 (? 5 = High Risk)  MD Orders: evaluate and treat  TREATMENT PLAN:  Effective Dates: 19 TO 20 Frequency/Duration: 2 times a week for 60 Day(s) MEDICAL/REFERRING DIAGNOSIS:Achilles tendinitis, right leg [M76.61]  DATE OF ONSET: Surgery date 5-15-19  REFERRING PHYSICIAN:Dakota Dinero MD  RETURN PHYSICIAN APPOINTMENT: did not specify       ASSESSMENT:  Ms. Wing Smith has come for a total of 24 visits since starting physical therapy on 7-10-19. During that time she continues to make steady progress and now has good ankle ROM and improved strength. She still has sensitivity at the distal end of the achilles and is unable to do a single leg heel raise; however, she is showing signs towards improvement in both. She is also suffering from knee and back pain which have limited her recovery of the foot evident by the lower LEFS score but we are working through that. Skilled physical therapy is recommended to continue progressing her plan of care in order to return her to full function with minimal symptoms. Thank you. PROBLEM LIST (Impacting functional limitations):  1. Pitting edema  2. Decreased ROM  3. Decreased strength  4. Antalgic gait pattern INTERVENTIONS PLANNED:  1. Home Exercise Program (HEP)  2. Manual Therapy  3. Therapeutic Exercise/Strengthening  4. Cryotherapy with vaso-pneumatic compression   GOALS: (Goals have been discussed and agreed upon with patient.)  SHORT-TERM FUNCTIONAL GOALS: Time Frame: 2-4 wks  1.   Patient will report 25-50% reduction in overall symptoms (MET)  2. Patient will be compliant with HEP and plan of care (MET)  3. Patient will have 10 deg of active DF (MET)  4. Patient will improve on the LEFS by 3-5 points (MET)  5. Patient will have 25 deg of PF (MET)  DISCHARGE GOALS: Time Frame: 6-8 wk  1. Patient will report % reduction in overall symptoms in order to be able to have full function with decreased symptoms (ongoing)  2. Patient will report feeling comfortable progressing their own plan of care with the home program prescribed (ongoing)  3. Patient will be able to ambulate with symmetrical gait pattern for 30 minutes with minimal pain (not consistent)  4. Patient will score 60 or greater on the LEFS in order to demonstrate improved function with less pain (ongoing)   5. Patient will be able to perform 5 single leg heel raises on R indicating good achilles/calf strength  (ongoing)     Rehabilitation Potential For Stated Goals: [address]  [facility] therapy, I certify that the treatment plan above will be carried out by a therapist or under their direction. Thank you for this referral,  Kandi Smith, PT, DPT     Referring Physician Signature: Alie Alejandro MD _______________________________ Date _____________            HISTORY:   History of Present Injury/Illness (Reason for Referral): reports that she initially hurt her achilles in January. She had her surgery in may 15. Three weeks post op she stumble and fell and displaced a stitch. She has since recovered. Numbness is mostly in the ball of the foot. Pain has been doing pretty well in her foot. Past Medical History/Comorbidities: R knee ACL, L knee meniscus   Social History/Living Environment: *  Prior Level of Function/Work/Activity:   Current Medications:       Current Outpatient Medications:     ascorbic acid, vitamin C, (VITAMIN C) 500 mg tablet, Take 1,000 mg by mouth daily. , Disp: , Rfl:     cholecalciferol (VITAMIN D3) 1,000 unit cap, Take 5,000 Units by mouth daily. , Disp: , Rfl:     cyanocobalamin, vitamin B-12, (VITAMIN B-12) 5,000 mcg subl, 5,000 mcg by SubLINGual route daily. , Disp: , Rfl:     OTHER,NON-FORMULARY,, Vit E 180 mg takes daily, Disp: , Rfl:     co-enzyme Q-10 (COQ-10) 100 mg capsule, Take 300 mg by mouth daily. , Disp: , Rfl:     estradiol (CLIMARA) 0.05 mg/24 hr, 1 Patch by TransDERmal route Every Saturday. , Disp: 12 Patch, Rfl: 3    fluticasone (FLONASE) 50 mcg/actuation nasal spray, 2 Sprays by Both Nostrils route daily. , Disp: 3 Bottle, Rfl: 3    levocetirizine (XYZAL) 5 mg tablet, TAKE 1 TABLET BY MOUTH EVERY DAY (Patient taking differently: daily. TAKE 1 TABLET BY MOUTH EVERY DAY  Indications: Allergic Rhinitis), Disp: 80 Tab, Rfl: 1  Date Last Reviewed:11/5/2019     Ambulatory/Rehab Services H2 Model Falls Risk Assessment    Risk Factors:       No Risk Factors Identified Ability to Rise from Chair:       (0)  Ability to rise in a single movement    Falls Prevention Plan:       No modifications necessary   Total: (5 or greater = High Risk): 0    ©2010 [facility]. All Rights Reserved. [facility] Patent #[id number].  Federal Law prohibits the replication, distribution or use without written permission from [facility] [employer]      Number of Personal Factors/Comorbidities that affect the Plan of Care: 0: LOW COMPLEXITY   EXAMINATION:   (Abbreviations: P-pain, NP- no pain, wnl-within normal limits)  Observation/Orthostatic Postural Assessment:    Relaxed standing posture:  · Incision has healed well, no drainage or redness noted    Palpation/tone/tissue texture:      Soft tissue:  · Gastroc/soleus/posterior tibialis: increased tone and tightness on R, no atrophy noted  · No edema noted in the foot        Foot Exam Date:  11/5/2019       Left Right   Talocrural Joint: wnl Minor hypomobility    Rear foot: (neutral to relaxed: 4-6                      deg-normal) - Mild hypomobility into inversion and eversion    Mid-foot (navicular drop, <7-normal) - Hypomobile (improving)    Forefoot: (position, mobility) - n/a   First ray position/hallux limitus test: About 65 deg About 65 deg   Windlass test: - n/a   Foot intrinsic strength:  - fair       ROM:   Multisegmental ROM Date:  11/5/2019       Flexion -   Extension -   Rotation L -   Rotation R -      Ankle ROM Date:  11/5/2019       Right Left   DF 12 deg knee straight  15   PF 35 (soft tissue tightness end feel) 35   Inversion 30 40   Eversion 15 15            Strength: Foot and ankle Strength Date:  11/5/2019       left right   DF n/a 5   PF - 4-   Inversion - 5   Eversion - 5                Special Tests:   Neurological Screen:   Myotomes: -     Dermatomes: decreased sensation in forefoot in plantar and dorsal aspect on R          Reflexes: -         Neural Tension Tests: n/a  Functional Mobility:    · Double leg squat: able to do a functional partial squat  · Gait Pattern: able to ambulate a more symmetrical gait pattern but still has decrease stance time on R   Balance:  Single leg   L: 5 sec  R:5 sec, moderate sway       CLINICAL DECISION MAKING:   Outcome Measure: Tool Used: Lower Extremity Functional Scale (LEFS)  Score:  Initial: 15/80 34/80 (Date: 9-4-19 ) Most Recent: 20/80 (Date 11-4-19)   Interpretation of Score: 20 questions each scored on a 5 point scale with 0 representing \"extreme difficulty or unable to perform\" and 4 representing \"no difficulty\". The lower the score, the greater the functional disability. 80/80 represents no disability. Minimal detectable change is 9 points. Medical Necessity:   · Patient is expected to demonstrate progress in strength, range of motion and symptom levels to return to full function. Reason for Services/Other Comments:  · Patient continues to require skilled intervention due to ongoing symptoms.        · Pain: Initial:  Minor heel pain, no achilles pain, more pain in the knee and back Post Session: 0/10 at rest in the ankle  ·   Compliance with Program/Exercises: Mostly Compliant   · Recommendations/Intent for next treatment session: \"Next visit will focus on progressing the patients plan of care\".     Future Appointments   Date Time Provider Ervin Jacqueline   11/7/2019  3:45 PM Leitha Mater, PT, DPT [facility]   11/11/2019  4:15 PM Leitha Mater, PT, DPT [facility]   11/14/2019  3:45 PM Leitha Mater, PT, DPT [facility]   11/18/2019  4:15 PM Leitha Mater, PT, DPT [facility]   11/21/2019  3:45 PM Leitha Mater, PT, DPT [facility]   11/25/2019  4:15 PM Leitha Mater, PT, DPT [facility]   11/29/2019  4:15 PM Leitha Mater, PT, DPT [facility]   12/3/2019 11:30 AM [facility]   12/5/2019  3:45 PM Leitha Mater, PT, DPT [facility]   12/9/2019  4:15 PM Leitha Mater, PT, DPT [facility]   12/12/2019  3:45 PM Leitha Mater, PT, DPT [facility]     Total Treatment Duration: 55 min, re-eval 5 min   PT Patient Time In/Time Out  Time In: 8025  Time Out: Viktoriya 72 PT, DPT

## 2019-11-04 NOTE — PROGRESS NOTES
Nikolai Hardin  : 1960  Primary: Parvin Gupta Of [facility]on*  Secondary:  [facility]  [address] , [address], [place] [address]  [phone]   [phone]        OUTPATIENT PHYSICAL THERAPY: Daily Treatment Note  2019       ICD-10: Treatment Diagnosis: ACHILLES TENDONITIS M76.61; DIFFICULTY WALKING, NOT ELSEWHERE CLASSIFIED R26.2     Precautions/Allergies: non reported  Fall Risk Score: 0 (? 5 = High Risk)  MD Orders: evaluate and treat  TREATMENT PLAN:  Effective Dates:2019 TO 2019. Frequency/Duration: 2 times a week for 60 Day(s) MEDICAL/REFERRING DIAGNOSIS:Achilles tendinitis, right leg [M76.61]  DATE OF ONSET: Surgery date 5-15-19  REFERRING PHYSICIAN:Nii Dinero MD  RETURN PHYSICIAN APPOINTMENT: did not specify        Pre-treatment Symptoms/Complaints: Patient reports that she is doing pretty good. States her knee and back are giving her more trouble now than her foot.   Pain: Initial:  Mild pain at rest Post Session: reports feeling pretty good   Medications Last Reviewed:  2019  Updated Objective Findings: not today   TREATMENT:   Therapeutic Exercise: (30min) (Done in order to improve mobility, strength, function and overall understanding of condition)   Date  10-18-19 Date  19 Date  19   Activity/Exercise      Education Discussed HEP Discussed HEP Discussed HEP   Bike/ Nu step 10 min warm up 10 min warm up 10 min warm up   DF stretch 60 sec, incline board 60 sec, incline board, 2x 60 sec, 2 sets, incline board   Foot intrinsics  HEP  HEP   PF · [place] full motion  · Up on 2, weight shift R, Lower slowly, 10x  · L LE propped on step, SL raise, 10x, 2 sets · [place] full motion  · Up on 2, weight shift R, Lower slowly, 10x  · L LE propped on step, SL raise, 10x, 2 sets · Supine, full motion, 20x, end range holds, 10 sec  · L LE propped on step, SL raise, 10x, 3 sets  ·    SL stance Step over, 20x Step over, 20x Step over, 20x, 2 sets   Step down 2\", 10x, 3 sets - 2\", 10x, 3 sets   Step up 4\", 10x, 3sets, ipsilateral support only 4\", 10x, 3 sets Had increased knee pain so it was stopped   Gait training - - -     Manual Therapy: (15 min) (Done to improve mobility, reduce tone, guarding and pain)  ·  Soft tissue mobilization to scar tissue especially along the calcaneous, achilles tendon distraaction, gastroc and soleus  · Tibiotalar/Talocrural joint PA and AP mobilization, grade III-IV  · calcaneous distraction and medial mobilization, grade III-IV   · Mid foot mobilization into pronation, done in supine hook lying       Modalities:( 10 minutes) (Done to reduce swelling and pain)  · Cryotherapy with vaso-pneumatic compression, medium compression; 34deg; supine w/ LE elevated    Treatment/Session Summary:    · Response to Treatment:Patient tolerated the session well. Progressing very well with motion and now strength is progressing as well; however, she is still unable to do SL heel raise. Discussed her home program.   · Communication/Consultation:   · Equipment provided today:  None today  · Recommendations/Intent for next treatment session: Next visit will focus on progressing established plan of care.   Treatment Plan of Care Effective Dates:  9-4-19 TO 12-4-19  Total Treatment Billable Duration: 60 min  (TE:30min,  Manual: 15 min, Vaso 10min)    PT Patient Time In/Time Out  Time In: 6817  Time Out: 1295    Signed By: Jt Ahn, PT, DPT     November 4, 2019

## 2019-11-07 ENCOUNTER — HOSPITAL ENCOUNTER (OUTPATIENT)
Dept: PHYSICAL THERAPY | Age: 59
Discharge: HOME | End: 2019-11-07
Payer: COMMERCIAL

## 2019-11-07 PROCEDURE — 97016 VASOPNEUMATIC DEVICE THERAPY: CPT

## 2019-11-07 PROCEDURE — 97140 MANUAL THERAPY 1/> REGIONS: CPT

## 2019-11-07 PROCEDURE — 97110 THERAPEUTIC EXERCISES: CPT

## 2019-11-11 ENCOUNTER — HOSPITAL ENCOUNTER (OUTPATIENT)
Dept: PHYSICAL THERAPY | Age: 59
Discharge: HOME | End: 2019-11-11
Payer: COMMERCIAL

## 2019-11-11 PROCEDURE — 97140 MANUAL THERAPY 1/> REGIONS: CPT

## 2019-11-11 PROCEDURE — 97110 THERAPEUTIC EXERCISES: CPT

## 2019-11-11 PROCEDURE — 97016 VASOPNEUMATIC DEVICE THERAPY: CPT

## 2019-11-11 NOTE — PROGRESS NOTES
Cadence Patella  : 1960  Primary: Cory Melo Of [facility] Staton*  Secondary:  [facility]  [address] , [address], [place] [address]  [phone]   [phone]        OUTPATIENT PHYSICAL THERAPY: Daily Treatment Note  2019       ICD-10: Treatment Diagnosis: ACHILLES TENDONITIS M76.61; DIFFICULTY WALKING, NOT ELSEWHERE CLASSIFIED R26.2     Precautions/Allergies: non reported  Fall Risk Score: 0 (? 5 = High Risk)  MD Orders: evaluate and treat  TREATMENT PLAN:  Effective Dates:2019 TO 2019. Frequency/Duration: 2 times a week for 60 Day(s) MEDICAL/REFERRING DIAGNOSIS:Achilles tendinitis, right leg [M76.61]  DATE OF ONSET: Surgery date 5-15-19  REFERRING PHYSICIAN:Dakota Dinero MD  RETURN PHYSICIAN APPOINTMENT: did not specify        Pre-treatment Symptoms/Complaints: Patient reports that he is doing pretty good. States she has an MRI of her back and knee scheduled soon.    Pain: Initial:  Mild pain at rest Post Session: reports feeling pretty good   Medications Last Reviewed:  2019  Updated Objective Findings: not today   TREATMENT:   Therapeutic Exercise: (35 min) (Done in order to improve mobility, strength, function and overall understanding of condition)   Date  19 Date  19 Date  19   Activity/Exercise      Education Discussed HEP Discussed HEP Discussed HEP   Bike/ Nu step 10 min warm up 10 min warm up 10 min warm up   DF stretch 60 sec, 2 sets, incline board 60 sec, 2 sets, incline board 60 sec, 2 sets   Foot intrinsics  HEP     PF · Supine, full motion, 20x, end range holds, 10 sec  · L LE propped on step, SL raise, 10x, 3 sets  ·  · Supine, full motion, 20x, end range holds, 10 sec  · L LE propped on step, SL raise, 10x, 3 sets · Supine, full motion, 20x, end range holds, 10 sec  · Sitting, 20x, max motion  · L LE propped on step, SL raise, 10x, 3 sets   SL stance Step over, 20x, 2 sets Step over, 20x, 2 sets Step over, 20x, 2 sets   Step down 2\", 10x, 3 sets 2\", 10x, 3 sets 2\", 10x, 3 sets   Step up Had increased knee pain so it was stopped  -   Gait training - -      Manual Therapy: (15 min) (Done to improve mobility, reduce tone, guarding and pain)  · Soft tissue mobilization to scar tissue especially along the calcaneous, achilles tendon distraction, gastroc and soleus  · Tibiotalar/Talocrural joint PA and AP mobilization, grade III-IV  · Talus lateral and medial glide  · calcaneous distraction and medial mobilization, grade III-IV   · Mid foot mobilization into pronation, done in supine hook lying       Modalities:( 10 minutes) (Done to reduce swelling and pain)  · Cryotherapy with vaso-pneumatic compression, medium compression; 34deg; supine w/ LE elevated    Treatment/Session Summary:    · Response to Treatment:Patient tolerated the session well. Her PF strength is showing signs of improvement. Continuing to work on progressing her plan of care. · Communication/Consultation:   · Equipment provided today:  None today  · Recommendations/Intent for next treatment session: Next visit will focus on progressing established plan of care.   Treatment Plan of Care Effective Dates:  9-4-19 TO 12-4-19  Total Treatment Billable Duration: 60 min  (TE:35min,  Manual: 15 min, Vaso 10min)    PT Patient Time In/Time Out  Time In: 1937  Time Out: 8048    Signed By: Preston Heaton, PT, DPT     November 11, 2019

## 2019-11-18 ENCOUNTER — HOSPITAL ENCOUNTER (OUTPATIENT)
Dept: PHYSICAL THERAPY | Age: 59
Discharge: HOME | End: 2019-11-18
Payer: COMMERCIAL

## 2019-11-18 PROCEDURE — 97016 VASOPNEUMATIC DEVICE THERAPY: CPT

## 2019-11-18 PROCEDURE — 97110 THERAPEUTIC EXERCISES: CPT

## 2019-11-18 PROCEDURE — 97140 MANUAL THERAPY 1/> REGIONS: CPT

## 2019-11-18 NOTE — PROGRESS NOTES
Cadence Patella  : 1960  Primary: Cory Melo Of [facility] Staton*  Secondary:  [facility]  [address] , [address], [place] [address]  [phone]   [phone]        OUTPATIENT PHYSICAL THERAPY: Daily Treatment Note  2019       ICD-10: Treatment Diagnosis: ACHILLES TENDONITIS M76.61; DIFFICULTY WALKING, NOT ELSEWHERE CLASSIFIED R26.2     Precautions/Allergies: non reported  Fall Risk Score: 0 (? 5 = High Risk)  MD Orders: evaluate and treat  TREATMENT PLAN:  Effective Dates:2019 TO 2019. Frequency/Duration: 2 times a week for 60 Day(s) MEDICAL/REFERRING DIAGNOSIS:Achilles tendinitis, right leg [M76.61]  DATE OF ONSET: Surgery date 5-15-19  REFERRING PHYSICIAN:Dakota Dinero MD  RETURN PHYSICIAN APPOINTMENT: did not specify        Pre-treatment Symptoms/Complaints: Patient reports that he is doing pretty good.  States she has an MRI of her back tomorrow morning  Pain: Initial:  Mild pain at rest Post Session: reports feeling pretty good   Medications Last Reviewed:  2019  Updated Objective Findings: not today   TREATMENT:   Therapeutic Exercise: (35 min) (Done in order to improve mobility, strength, function and overall understanding of condition)   Date  19 Date  19 Date  19 Date  19   Activity/Exercise       Education Discussed HEP Discussed HEP Discussed HEP Discussed HEP   Bike/ Nu step 10 min warm up 10 min warm up 10 min warm up 10 min warm up   DF stretch 60 sec, 2 sets, incline board 60 sec, 2 sets, incline board 60 sec, 2 sets 60 sec, knees straight and bent   Foot intrinsics  HEP      PF · Supine, full motion, 20x, end range holds, 10 sec  · L LE propped on step, SL raise, 10x, 3 sets  ·  · Supine, full motion, 20x, end range holds, 10 sec  · L LE propped on step, SL raise, 10x, 3 sets · Supine, full motion, 20x, end range holds, 10 sec  · Sitting, 20x, max motion  · L LE propped on step, SL raise, 10x, 3 sets · Supine, full motion, 20x, end range holds, 10 sec  · Sitting, 20x, max motion  · L LE propped on step, SL raise, 10x, 3 sets   SL stance Step over, 20x, 2 sets Step over, 20x, 2 sets Step over, 20x, 2 sets Step over, 10x, 3 sets   Step down 2\", 10x, 3 sets 2\", 10x, 3 sets 2\", 10x, 3 sets 4\", 10x, 2 sets   Step up Had increased knee pain so it was stopped  - -   Gait training - -       Manual Therapy: (15 min) (Done to improve mobility, reduce tone, guarding and pain)  · Soft tissue mobilization to scar tissue especially along the calcaneous, achilles tendon distraction, gastroc and soleus  · Tibiotalar/Talocrural joint PA and AP mobilization, grade III-IV  · Talus lateral and medial glide  · calcaneous distraction and medial mobilization, grade III-IV   · Mid foot mobilization into pronation, done in supine hook lying       Modalities:( 10 minutes) (Done to reduce swelling and pain)  · Cryotherapy with vaso-pneumatic compression, medium compression; 34deg; supine w/ LE elevated    Treatment/Session Summary:    · Response to Treatment:Patient tolerated the session well. Continue to push her ROM, strength and balance. · Communication/Consultation:   · Equipment provided today:  None today  · Recommendations/Intent for next treatment session: Next visit will focus on progressing established plan of care.   Treatment Plan of Care Effective Dates:  9-4-19 TO 12-4-19  Total Treatment Billable Duration: 60 min  (TE:35min,  Manual: 15 min, Vaso 10min)    PT Patient Time In/Time Out  Time In: 2989  Time Out: 3372    Signed By: Lexy Campos, PT, DPT     November 18, 2019

## 2019-11-21 ENCOUNTER — HOSPITAL ENCOUNTER (OUTPATIENT)
Dept: PHYSICAL THERAPY | Age: 59
Discharge: HOME | End: 2019-11-21
Payer: COMMERCIAL

## 2019-11-21 PROCEDURE — 97110 THERAPEUTIC EXERCISES: CPT

## 2019-11-21 PROCEDURE — 97140 MANUAL THERAPY 1/> REGIONS: CPT

## 2019-11-21 NOTE — PROGRESS NOTES
Dov Eliseo  : 1960  Primary: Salvador Reddy Of [facility]on*  Secondary:  [facility]  [address], [place] [address]  [phone]   [phone]        OUTPATIENT PHYSICAL THERAPY: Daily Treatment Note  2019       ICD-10: Treatment Diagnosis: ACHILLES TENDONITIS M76.61; DIFFICULTY WALKING, NOT ELSEWHERE CLASSIFIED R26.2     Precautions/Allergies: non reported  Fall Risk Score: 0 (? 5 = High Risk)  MD Orders: evaluate and treat  TREATMENT PLAN:  Effective Dates:2019 TO 2019. Frequency/Duration: 2 times a week for 60 Day(s) MEDICAL/REFERRING DIAGNOSIS:Achilles tendinitis, right leg [M76.61]  DATE OF ONSET: Surgery date 5-15-19  REFERRING PHYSICIAN:Gerardo Dinero MD  RETURN PHYSICIAN APPOINTMENT: did not specify        Pre-treatment Symptoms/Complaints: Patient reports that she had an MRI on her back and has injections scheduled on Monday for her back.  Foot feels pretty good  Pain: Initial: no pain in the foot Post Session: reports feeling good in the foot   Medications Last Reviewed:  2019  Updated Objective Findings: not today   TREATMENT:   Therapeutic Exercise: (35 min) (Done in order to improve mobility, strength, function and overall understanding of condition)   Date  19 Date  19 Date  19   Activity/Exercise      Education Discussed HEP Discussed HEP · Discussed HEP  · Taped R knee for patella distraction using kinesio tape   Bike/ Nu step 10 min warm up 10 min warm up 10 min warm up   DF stretch 60 sec, 2 sets 60 sec, knees straight and bent 60 sec, knees straight and bent   Foot intrinsics    -   PF · Supine, full motion, 20x, end range holds, 10 sec  · Sitting, 20x, max motion  · L LE propped on step, SL raise, 10x, 3 sets · Supine, full motion, 20x, end range holds, 10 sec  · Sitting, 20x, max motion  · L LE propped on step, SL raise, 10x, 3 sets · Supine, full motion, 20x, end range holds, 10 sec  · Sitting, 20x, max motion  · L LE propped on step, SL raise, 10x, 3 sets   SL stance Step over, 20x, 2 sets Step over, 10x, 3 sets Step over, 10x, 3 sets   Step down 2\", 10x, 3 sets 4\", 10x, 2 sets 4\", 10x, 3 sets   Step up - - -   Gait training   -     Manual Therapy: (15 min) (Done to improve mobility, reduce tone, guarding and pain)  · Soft tissue mobilization to scar tissue especially along the calcaneous, achilles tendon distraction, gastroc and soleus  · Tibiotalar/Talocrural joint PA and AP mobilization, grade III-IV  · Talus lateral and medial glide  · calcaneous distraction and medial mobilization, grade III-IV   · Mid foot mobilization into pronation, done in supine hook lying       Modalities:( - minutes) (Done to reduce swelling and pain)      Treatment/Session Summary:    · Response to Treatment:Patient tolerated the session well. Continues to demonstrate improvement in strength in the achilles. · Communication/Consultation:   · Equipment provided today:  None today  · Recommendations/Intent for next treatment session: Next visit will focus on progressing established plan of care.   Treatment Plan of Care Effective Dates:  9-4-19 TO 12-4-19  Total Treatment Billable Duration: 50 min  (TE:35min,  Manual: 15 min, )    PT Patient Time In/Time Out  Time In: 4180  Time Out: 1640    Signed By: Ran Ferreira, PT, DPT     November 21, 2019

## 2019-11-29 ENCOUNTER — APPOINTMENT (OUTPATIENT)
Dept: PHYSICAL THERAPY | Age: 59
End: 2019-11-29
Payer: COMMERCIAL

## 2019-12-05 ENCOUNTER — APPOINTMENT (OUTPATIENT)
Dept: PHYSICAL THERAPY | Age: 59
End: 2019-12-05
Payer: COMMERCIAL

## 2019-12-09 ENCOUNTER — HOSPITAL ENCOUNTER (OUTPATIENT)
Dept: PHYSICAL THERAPY | Age: 59
Discharge: HOME | End: 2019-12-09
Payer: COMMERCIAL

## 2019-12-09 PROCEDURE — 97110 THERAPEUTIC EXERCISES: CPT

## 2019-12-09 PROCEDURE — 97140 MANUAL THERAPY 1/> REGIONS: CPT

## 2019-12-09 NOTE — PROGRESS NOTES
Jules Browne  : 1960  Primary: [facility]MA Of Stephanie Staton*  [facility]:  [facility] [address], [place] [address]  [phone]   [phone]        OUTPATIENT PHYSICAL THERAPY: Daily Treatment Note  2019       ICD-10: Treatment Diagnosis: ACHILLES TENDONITIS M76.61; DIFFICULTY WALKING, NOT ELSEWHERE CLASSIFIED R26.2     Precautions/Allergies: non reported  Fall Risk Score: 0 (? 5 = High Risk)  MD Orders: evaluate and treat  TREATMENT PLAN:  Effective Dates:19 TO 20 Frequency/Duration: 2 times a week for 60 Day(s) MEDICAL/REFERRING DIAGNOSIS:Achilles tendinitis, right leg [M76.61]  DATE OF ONSET: Surgery date 5-15-19  REFERRING PHYSICIAN:Gustavo Dinero MD  RETURN PHYSICIAN APPOINTMENT: did not specify        Pre-treatment Symptoms/Complaints: Patient reports that she had an MRI on her back and has injections scheduled on Monday for her back. Foot feels pretty good. Feels like she can walk much better.    Pain: Initial: no pain in the foot Post Session: reports feeling good in the foot   Medications Last Reviewed:  2019  Updated Objective Findings: not today   TREATMENT:   Therapeutic Exercise: (30 min) (Done in order to improve mobility, strength, function and overall understanding of condition)   Date  19 Date  19 Date  19 Date  19   Activity/Exercise       Education Discussed HEP Discussed HEP · Discussed HEP  · Taped R knee for patella distraction using kinesio tape · Discussed HEP   Bike/ Nu step 10 min warm up 10 min warm up 10 min warm up 10 min warm up   DF stretch 60 sec, 2 sets 60 sec, knees straight and bent 60 sec, knees straight and bent 60 sec, knees straight   Foot intrinsics    -    PF · Supine, full motion, 20x, end range holds, 10 sec  · Sitting, 20x, max motion  · L LE propped on step, SL raise, 10x, 3 sets · Supine, full motion, 20x, end range holds, 10 sec  · Sitting, 20x, max motion  · L LE propped on step, SL raise, 10x, 3 sets · Supine, full motion, 20x, end range holds, 10 sec  · Sitting, 20x, max motion  · L LE propped on step, SL raise, 10x, 3 sets · L LE propped on step, SL raise, 10x, 3 sets   SL stance Step over, 20x, 2 sets Step over, 10x, 3 sets Step over, 10x, 3 sets Step over, 10x, 3 sets   Step down 2\", 10x, 3 sets 4\", 10x, 2 sets 4\", 10x, 3 sets 4\", 10x, 3 sets   Step up - - - -   Gait training   - -     Manual Therapy: (15 min) (Done to improve mobility, reduce tone, guarding and pain)  · Soft tissue mobilization to scar tissue especially along the calcaneous, achilles tendon distraction, gastroc and soleus  · Tibiotalar/Talocrural joint PA and AP mobilization, grade III-IV  · Talus lateral and medial glide  · calcaneous distraction and medial mobilization, grade III-IV   · Mid foot mobilization into pronation, done in supine hook lying       Modalities:( - minutes) (Done to reduce swelling and pain)      Treatment/Session Summary:    · Response to Treatment:Patient tolerated the session well. At this time she feels comfortable progressing her home program on her own and will be discharged until further notice.      Total Treatment Billable Duration: 45 min  (TE:30min,  Manual: 15 min)  (re-eval 5 min)    PT Patient Time In/Time Out  Time In: 1331  Time Out: 1303    Signed By: Delisa Oro, PT, DPT     December 9, 2019

## 2019-12-09 NOTE — THERAPY DISCHARGE
Nikolai Hardin  : 1960  Primary: Parvin Gupta Of Stephanie Staton*  [facility]:  [facility], [address], [place] [address]  [phone]   [phone]         OUTPATIENT PHYSICAL THERAPY:Discharge 2019    ICD-10: Treatment Diagnosis: ACHILLES TENDONITIS M76.61; DIFFICULTY WALKING, NOT ELSEWHERE CLASSIFIED R26.2    Precautions/Allergies: non reported  Fall Risk Score: 0 (? 5 = High Risk)  MD Orders: evaluate and treat MEDICAL/REFERRING DIAGNOSIS:Achilles tendinitis, right leg [M76.61]  DATE OF ONSET: Surgery date 5-15-19  REFERRING PHYSICIAN:Nii Dinero MD  RETURN PHYSICIAN APPOINTMENT: did not specify       ASSESSMENT:  Ms. Benjamín Fisher has come for a total of 29 visits since starting physical therapy on 7-10-19. During that time she progressed gradually and now is able to ambulate with a symmetrical gait pattern without pain, has good ROM of the ankle and foot, and has functional strength. She is still unable to perform a single leg heel raise but if she continues to progress her exercises than she should be able to do that in the near future. At this time she feels comfortable progressing on her own and will be discharged until further notice. Thank you. GOALS: (Goals have been discussed and agreed upon with patient.)  SHORT-TERM FUNCTIONAL GOALS: Time Frame: 2-4 wks  1. Patient will report 25-50% reduction in overall symptoms (MET)  2. Patient will be compliant with HEP and plan of care (MET)  3. Patient will have 10 deg of active DF (MET)  4. Patient will improve on the LEFS by 3-5 points (MET)  5. Patient will have 25 deg of PF (MET)  DISCHARGE GOALS: Time Frame: 6-8 wk  1. Patient will report % reduction in overall symptoms in order to be able to have full function with decreased symptoms (MET)  2. Patient will report feeling comfortable progressing their own plan of care with the home program prescribed (MET)  3.   Patient will be able to ambulate with symmetrical gait pattern for 30 minutes with minimal pain (MET)  4. Patient will score 60 or greater on the LEFS in order to demonstrate improved function with less pain ((MET)   5. Patient will be able to perform 5 single leg heel raises on R indicating good achilles/calf strength  (NOT MET)     Regarding Sarai Mcadams's therapy, I certify that the treatment plan above will be carried out by a therapist or under their direction. Thank you for this referral,  Lamar Ibanez, PT, DPT                 HISTORY:   History of Present Injury/Illness (Reason for Referral): reports that she initially hurt her achilles in January. She had her surgery in may 15. Three weeks post op she stumble and fell and displaced a stitch. She has since recovered. Numbness is mostly in the ball of the foot. Pain has been doing pretty well in her foot. Past Medical History/Comorbidities: R knee ACL, L knee meniscus   Social History/Living Environment: lives in a single family home  Prior Level of Function/Work/Activity:   Current Medications:       Current Outpatient Medications:     ascorbic acid, vitamin C, (VITAMIN C) 500 mg tablet, Take 1,000 mg by mouth daily. , Disp: , Rfl:     cholecalciferol (VITAMIN D3) 1,000 unit cap, Take 5,000 Units by mouth daily. , Disp: , Rfl:     cyanocobalamin, vitamin B-12, (VITAMIN B-12) 5,000 mcg subl, 5,000 mcg by SubLINGual route daily. , Disp: , Rfl:     OTHER,NON-FORMULARY,, Vit E 180 mg takes daily, Disp: , Rfl:     co-enzyme Q-10 (COQ-10) 100 mg capsule, Take 300 mg by mouth daily. , Disp: , Rfl:     estradiol (CLIMARA) 0.05 mg/24 hr, 1 Patch by TransDERmal route Every Saturday. , Disp: 12 Patch, Rfl: 3    fluticasone (FLONASE) 50 mcg/actuation nasal spray, 2 Sprays by Both Nostrils route daily. , Disp: 3 Bottle, Rfl: 3    levocetirizine (XYZAL) 5 mg tablet, TAKE 1 TABLET BY MOUTH EVERY DAY (Patient taking differently: daily.  TAKE 1 TABLET BY MOUTH EVERY DAY  Indications: Allergic Rhinitis), Disp: 90 Tab, Rfl: 1  Date Last Reviewed:12/10/2019     Ambulatory/Rehab Services H2 Model Falls Risk Assessment    Risk Factors:       No Risk Factors Identified Ability to Rise from Chair:       (0)  Ability to rise in a single movement    Falls Prevention Plan:       No modifications necessary   Total: (5 or greater = High Risk): 0    ©2010 [facility] [employer]. All Rights Reserved. [facility] [place] Patent #[id number]. Federal Law prohibits the replication, distribution or use without written permission from [facility] [employer]      Number of Personal Factors/Comorbidities that affect the Plan of Care: 0: LOW COMPLEXITY   EXAMINATION:   (Abbreviations: P-pain, NP- no pain, wnl-within normal limits)  Observation/Orthostatic Postural Assessment:    Relaxed standing posture:  · Incision has healed well, no drainage or redness noted    Palpation/tone/tissue texture:      Soft tissue:  · Gastroc/soleus/posterior tibialis: increased tone and tightness on R, no atrophy noted  · No edema noted in the foot        Foot Exam Date:  12/10/2019       Left Right   Talocrural Joint: wnl Minor hypomobility    Rear foot: (neutral to relaxed: 4-6                      deg-normal) - Mild hypomobility into inversion and eversion    Mid-foot (navicular drop, <7-normal) - Mild Hypomobility    Forefoot: (position, mobility) - n/a   First ray position/hallux limitus test: About 65 deg About 65 deg   Windlass test: - n/a   Foot intrinsic strength:  - fair       ROM:      Ankle ROM Date:  12/10/2019       Right Left   DF 14 deg knee straight  15   PF 35 (soft tissue tightness end feel) 35   Inversion 30 40   Eversion 15 15            Strength:      Foot and ankle Strength Date:  12/10/2019       left right   DF n/a 5   PF - 4-   Inversion - 5   Eversion - 5                Special Tests:   Neurological Screen:   Myotomes: -     Dermatomes: n/a today         Reflexes: -         Neural Tension Tests: n/a  Functional Mobility:    · Double leg squat: able to do a functional partial squat  · Stairs: able to ascend and descend from a 6\" step   · Gait Pattern: able to ambulate with a symmetrical gait pattern   Balance:  Single leg   L: 5 sec  R:5 sec       CLINICAL DECISION MAKING:   Outcome Measure: Tool Used: Lower Extremity Functional Scale (LEFS)  Interpretation of Score: 20 questions each scored on a 5 point scale with 0 representing \"extreme difficulty or unable to perform\" and 4 representing \"no difficulty\". The lower the score, the greater the functional disability. 80/80 represents no disability. Minimal detectable change is 9 points. Foot and Ankle Ability Measure:  Score: Most recent: 63/84 (12-9-19)   Interpretation of Score: The response to each item on the ADL subscale is scored from 4 to 0, with 4 being \"no difficulty\" and 0 being \"unable to do. \" N/A responses are not counted. The total # of responses is multiplied by 4 to get the highest potential score. If subject answers all 21 items, then the highest potential score is 84. If one item is left off then 80 is the highest potential score and so on.   MDC= 6 points over a 4 week period, Minimal Clinically Important Difference=8 points         · Pain: Initial:  No pain reported Post Session:no pain reported          Total Treatment Duration: 45 min, re-eval 5 min   PT Patient Time In/Time Out  Time In: 1620  Time Out: 9036 [facility] PT, DPT

## 2019-12-12 ENCOUNTER — APPOINTMENT (OUTPATIENT)
Dept: PHYSICAL THERAPY | Age: 59
End: 2019-12-12
Payer: COMMERCIAL

## 2020-03-05 ENCOUNTER — HOSPITAL ENCOUNTER (OUTPATIENT)
Dept: MAMMOGRAPHY | Age: 60
Discharge: HOME | End: 2020-03-05
Attending: FAMILY MEDICINE
Payer: COMMERCIAL

## 2020-03-05 DIAGNOSIS — Z78.0 POST-MENOPAUSAL: ICD-10-CM

## 2020-03-05 PROCEDURE — 77080 DXA BONE DENSITY AXIAL: CPT

## 2021-03-31 NOTE — PROGRESS NOTES
Sole Mali  : 1960  Primary: Jessica Ticoelidia Of Stephanie Staton*  Secondary:  [facility]  [address] , [address], [place] [address]  [phone]   [phone]        OUTPATIENT PHYSICAL THERAPY: Daily Treatment Note  2019       ICD-10: Treatment Diagnosis: ACHILLES TENDONITIS M76.61; DIFFICULTY WALKING, NOT ELSEWHERE CLASSIFIED R26.2     Precautions/Allergies: non reported  Fall Risk Score: 0 (? 5 = High Risk)  MD Orders: evaluate and treat  TREATMENT PLAN:  Effective Dates: 7/10/2019 TO 9/10/2019. Frequency/Duration: 2 times a week for 60 Day(s) MEDICAL/REFERRING DIAGNOSIS:Achilles tendinitis, right leg [M76.61]  DATE OF ONSET: Surgery date 5-15-19  REFERRING PHYSICIAN:James Dinero MD  RETURN PHYSICIAN APPOINTMENT: did not specify        Pre-treatment Symptoms/Complaints: Patient reports that she is a bit sore today. States she tried walking in a flat shoe for a while yesterday and the foot got irritated.    Pain: Initial:  Mild pain at rest Post Session: reports feeling good after cryotherapy   Medications Last Reviewed:  2019  Updated Objective Findings: not today   TREATMENT:   Therapeutic Exercise: (25min) (Done in order to improve mobility, strength, function and overall understanding of condition)   Date  19 Date  19 Date  19   Activity/Exercise      Education Discussed HEP Discussed HEP Discussed HEP   Bike/ Nu step 10 min 10 min 10 min   DF stretch Knee over foot, 20x  Incline board: 30 sec, 2x Knee over foot, 20x  Incline board: 30 sec, 2x Knee over foot, 20x  Incline board: 30 sec, 2x   isometrics 4 quadrants, 10 sec hold, 3x 4 quadrants, 10x, 3x 4 quadrants, 10x,    Foot intrinsics  -  HEP   PF · Supine, 20x, full motion  · Sitting,full motion, 20x  · Standin% WB on effected foot, 10x, 3 sets · Supine, 20x, full motion  · Sitting,full motion, 20x  · Standin% WB on effected foot, 10x, 3 sets · Supine, 20x, full motion  · Sitting,full motion, 20x  · Standin% WB on effected foot, 10x, 3 sets   SL stance Done with step ups - Done with step ups   Step up 10x, 3 sets, 4\" w/ contralateral hip flexion - 10x, 3 sets, 6\", w/ contralateral hip flexion    Step down 10x, 3 sets, 2\",  - -   Gait training Reviewed  - -     Manual Therapy: (20 min) (Done to improve mobility, reduce tone, guarding and pain)  ·  Soft tissue mobilization to scar tissue especially along the calcaneous, gastroc and soleus  · Tibiotalar/Talocrural joint PA and AP mobilization, grade III-IV  · calcaneous distraction, medial and lateral mobilization, grade III-IV  · Mid foot mobilization into pronation, done in supine and standing      Modalities:( 15 minutes) (Done to reduce swelling and pain)  · Cryotherapy with vaso-pneumatic compression, low; 34deg; supine w/ LE elevated    Treatment/Session Summary:    · Response to Treatment:Patient tolerated the session well. She is progressing gradually with ROM and strength. We are trying to be careful and stay underneath the swelling threshold. · Communication/Consultation:   · Equipment provided today:  None today  · Recommendations/Intent for next treatment session: Next visit will focus on progressing established plan of care.   Treatment Plan of Care Effective Dates:  19 TO 19  Total Treatment Billable Duration: 60 min  (TE: 25 min,  Manual: 20 min, Vaso 15min)    PT Patient Time In/Time Out  Time In: 3156  Time Out: 8364    Signed By: Myra Souza PT, DPT     2019 Quality 226: Preventive Care And Screening: Tobacco Use: Screening And Cessation Intervention: Patient screened for tobacco use and is an ex/non-smoker Quality 130: Documentation Of Current Medications In The Medical Record: Current Medications Documented Detail Level: Detailed Quality 431: Preventive Care And Screening: Unhealthy Alcohol Use - Screening: Patient screened for unhealthy alcohol use using a single question and scores less than 2 times per year Quality 110: Preventive Care And Screening: Influenza Immunization: Influenza Immunization Administered during Influenza season

## 2021-06-01 ENCOUNTER — TRANSCRIBE ORDER (OUTPATIENT)
Dept: REGISTRATION | Age: 61
End: 2021-06-01

## 2021-06-01 DIAGNOSIS — Z12.31 VISIT FOR SCREENING MAMMOGRAM: Primary | ICD-10-CM

## 2021-07-07 ENCOUNTER — HOSPITAL ENCOUNTER (OUTPATIENT)
Dept: MAMMOGRAPHY | Age: 61
Discharge: HOME | End: 2021-07-07
Attending: FAMILY MEDICINE
Payer: COMMERCIAL

## 2021-07-07 DIAGNOSIS — Z12.31 VISIT FOR SCREENING MAMMOGRAM: ICD-10-CM

## 2021-07-07 PROCEDURE — 77067 SCR MAMMO BI INCL CAD: CPT

## 2023-12-06 NOTE — PROGRESS NOTES
Pedro Zhang  : 1960  Primary: Rashid Molina Of Stephanie Staton*  Secondary:  [facility]  [address] , [address], [place] [address]  [phone]   [phone]        OUTPATIENT PHYSICAL THERAPY: Daily Treatment Note  2019       ICD-10: Treatment Diagnosis: ACHILLES TENDONITIS M76.61; DIFFICULTY WALKING, NOT ELSEWHERE CLASSIFIED R26.2     Precautions/Allergies: non reported  Fall Risk Score: 0 (? 5 = High Risk)  MD Orders: evaluate and treat  TREATMENT PLAN:  Effective Dates:2019 TO 2019. Frequency/Duration: 2 times a week for 60 Day(s) MEDICAL/REFERRING DIAGNOSIS:Achilles tendinitis, right leg [M76.61]  DATE OF ONSET: Surgery date 5-15-19  REFERRING PHYSICIAN:Isha Dinero MD  RETURN PHYSICIAN APPOINTMENT: did not specify        Pre-treatment Symptoms/Complaints: Patient reports that she feels stiff today.    Pain: Initial:  Mild pain at rest Post Session: reports feeling pretty good   Medications Last Reviewed:  2019  Updated Objective Findings: not today   TREATMENT:   Therapeutic Exercise: (35 min) (Done in order to improve mobility, strength, function and overall understanding of condition)   Date  19 Date  19 Date  19   Activity/Exercise      Education Discussed HEP Discussed HEP Discussed HEP   Bike/ Nu step 10 min warm up 10 min warm up 10 min warm up   DF stretch 60 sec, incline board, 2x 60 sec, 2 sets, incline board 60 sec, 2 sets, incline board   Foot intrinsics   HEP    PF · Sittinx full motion  · Up on 2, weight shift R, Lower slowly, 10x  · L LE propped on step, SL raise, 10x, 2 sets · Supine, full motion, 20x, end range holds, 10 sec  · L LE propped on step, SL raise, 10x, 3 sets  ·  · Supine, full motion, 20x, end range holds, 10 sec  · L LE propped on step, SL raise, 10x, 3 sets   SL stance Step over, 20x Step over, 20x, 2 sets Step over, 20x, 2 sets   Step down - 2\", 10x, 3 sets 2\", 10x, 3 sets RT notified of ordered POC testing.    Step up 4\", 10x, 3 sets Had increased knee pain so it was stopped    Gait training - - -     Manual Therapy: (15 min) (Done to improve mobility, reduce tone, guarding and pain)  · Soft tissue mobilization to scar tissue especially along the calcaneous, achilles tendon distraction, gastroc and soleus  · Tibiotalar/Talocrural joint PA and AP mobilization, grade III-IV  · Talus lateral and medial glide  · calcaneous distraction and medial mobilization, grade III-IV   · Mid foot mobilization into pronation, done in supine hook lying       Modalities:( 10 minutes) (Done to reduce swelling and pain)  · Cryotherapy with vaso-pneumatic compression, medium compression; 34deg; supine w/ LE elevated    Treatment/Session Summary:    · Response to Treatment:Patient tolerated the session well. She had increased talocrural tightness today but was able to improve with treatment. Discussed her home program.   · Communication/Consultation:   · Equipment provided today:  None today  · Recommendations/Intent for next treatment session: Next visit will focus on progressing established plan of care.   Treatment Plan of Care Effective Dates:  9-4-19 TO 12-4-19  Total Treatment Billable Duration: 60 min  (TE:35min,  Manual: 15 min, Vaso 10min)    PT Patient Time In/Time Out  Time In: 6354  Time Out: 5712    Signed By: Víctor Romano, PT, DPT     November 7, 2019

## 2024-01-11 ENCOUNTER — OFFICE VISIT (OUTPATIENT)
Dept: ORTHOPEDIC SURGERY | Age: 64
End: 2024-01-11
Payer: COMMERCIAL

## 2024-01-11 DIAGNOSIS — M43.16 SPONDYLOLISTHESIS OF LUMBAR REGION: Primary | ICD-10-CM

## 2024-01-11 DIAGNOSIS — M71.38 SYNOVIAL CYST OF LUMBAR FACET JOINT: ICD-10-CM

## 2024-01-11 DIAGNOSIS — M47.816 FACET ARTHROPATHY, LUMBAR: ICD-10-CM

## 2024-01-11 PROCEDURE — 99214 OFFICE O/P EST MOD 30 MIN: CPT | Performed by: PHYSICIAN ASSISTANT

## 2024-01-22 ENCOUNTER — OFFICE VISIT (OUTPATIENT)
Dept: ORTHOPEDIC SURGERY | Age: 64
End: 2024-01-22
Payer: COMMERCIAL

## 2024-01-22 DIAGNOSIS — M43.16 SPONDYLOLISTHESIS OF LUMBAR REGION: Primary | ICD-10-CM

## 2024-01-22 DIAGNOSIS — M51.36 DDD (DEGENERATIVE DISC DISEASE), LUMBAR: ICD-10-CM

## 2024-01-22 DIAGNOSIS — M47.816 FACET ARTHROPATHY, LUMBAR: ICD-10-CM

## 2024-01-22 PROCEDURE — 64493 INJ PARAVERT F JNT L/S 1 LEV: CPT | Performed by: PHYSICAL MEDICINE & REHABILITATION

## 2024-01-22 PROCEDURE — 64494 INJ PARAVERT F JNT L/S 2 LEV: CPT | Performed by: PHYSICAL MEDICINE & REHABILITATION

## 2024-01-22 PROCEDURE — 64495 INJ PARAVERT F JNT L/S 3 LEV: CPT | Performed by: PHYSICAL MEDICINE & REHABILITATION

## 2024-01-22 RX ORDER — TRIAMCINOLONE ACETONIDE 40 MG/ML
40 INJECTION, SUSPENSION INTRA-ARTICULAR; INTRAMUSCULAR ONCE
Status: COMPLETED | OUTPATIENT
Start: 2024-01-22 | End: 2024-01-22

## 2024-01-22 RX ADMIN — TRIAMCINOLONE ACETONIDE 40 MG: 40 INJECTION, SUSPENSION INTRA-ARTICULAR; INTRAMUSCULAR at 15:22

## 2024-01-22 NOTE — PROGRESS NOTES
Name: Daja Armas  YOB: 1960  Gender: female  MRN: [id number]    Procedure: Left  L3-L4, L4-L5, and L5-S1 facet joint injections     Precautions: Daja Armas deniesprior sensitivity to steroid, local anesthetic, iodine, or shellfish.     The procedure was discussed at length with her and informed consent was signed and placed in the chart. She was placed in a prone position on the fluoroscopy table and the skin was prepped and draped in a routine sterile fashion. The areas to be injected were each anesthetized with 1% lidocaine.    Next, a 25-gauge 3.5 inch spinal needle was carefully advanced under fluoroscopic guidance to the leftL5 - S1 facet joint. Aspiration was negative. Once proper placement was confirmed, 1 ml of 0.25% Marcaine and  0.5 mL 40 mg/mL kenalog were injected through the spinal needle.     The above procedure was then repeated through the leftL4 - L5 and left L3 - L4 facet joint.      Fluoroscopic guidance was used intermittently over a 10-minute period to insure proper needle placement and her safety. A hard copy of the fluoroscopic images has been placed in her chart and is saved on the C-arm hard drive. She was monitored for 30 minutes after the procedure and discharged home in a stable fashion with a routine follow up.     Procedural Diagnosis:     ICD-10-CM    1. Spondylolisthesis of lumbar region  M43.16 FL INJ LUMB/SAC FACET SINGLE LEVEL     XR INJ FACET LUMB SACRAL 2ND LVL     triamcinolone acetonide (KENALOG-40) injection 40 mg     Injection Authorization - Spine      2. Facet arthropathy, lumbar  M47.816 FL INJ LUMB/SAC FACET SINGLE LEVEL     XR INJ FACET LUMB SACRAL 2ND LVL     triamcinolone acetonide (KENALOG-40) injection 40 mg     Injection Authorization - Spine      3. DDD (degenerative disc disease), lumbar  M51.36 FL INJ LUMB/SAC FACET SINGLE LEVEL     XR INJ FACET LUMB SACRAL 2ND LVL     triamcinolone acetonide (KENALOG-40) injection 40 mg

## 2024-02-05 ENCOUNTER — OFFICE VISIT (OUTPATIENT)
Dept: ORTHOPEDIC SURGERY | Age: 64
End: 2024-02-05
Payer: COMMERCIAL

## 2024-02-05 DIAGNOSIS — M47.816 FACET ARTHROPATHY, LUMBAR: ICD-10-CM

## 2024-02-05 DIAGNOSIS — M51.36 DDD (DEGENERATIVE DISC DISEASE), LUMBAR: ICD-10-CM

## 2024-02-05 DIAGNOSIS — M43.16 SPONDYLOLISTHESIS OF LUMBAR REGION: Primary | ICD-10-CM

## 2024-02-05 PROCEDURE — 64493 INJ PARAVERT F JNT L/S 1 LEV: CPT | Performed by: PHYSICAL MEDICINE & REHABILITATION

## 2024-02-05 PROCEDURE — 64495 INJ PARAVERT F JNT L/S 3 LEV: CPT | Performed by: PHYSICAL MEDICINE & REHABILITATION

## 2024-02-05 PROCEDURE — 64494 INJ PARAVERT F JNT L/S 2 LEV: CPT | Performed by: PHYSICAL MEDICINE & REHABILITATION

## 2024-02-05 RX ORDER — TRIAMCINOLONE ACETONIDE 40 MG/ML
40 INJECTION, SUSPENSION INTRA-ARTICULAR; INTRAMUSCULAR ONCE
Status: COMPLETED | OUTPATIENT
Start: 2024-02-05 | End: 2024-02-05

## 2024-02-05 RX ADMIN — TRIAMCINOLONE ACETONIDE 40 MG: 40 INJECTION, SUSPENSION INTRA-ARTICULAR; INTRAMUSCULAR at 15:26

## 2024-02-05 NOTE — PROGRESS NOTES
Name: Daja Armas  YOB: 1960  Gender: female  MRN: [id number]    Procedure: Right  L3-L4, L4-L5, and L5-S1 facet joint injections     Precautions: Daja Armas deniesprior sensitivity to steroid, local anesthetic, iodine, or shellfish.     The procedure was discussed at length with her and informed consent was signed and placed in the chart. She was placed in a prone position on the fluoroscopy table and the skin was prepped and draped in a routine sterile fashion. The areas to be injected were each anesthetized with 1% lidocaine.    Next, a 25-gauge 3.5 inch spinal needle was carefully advanced under fluoroscopic guidance to the rightL5 - S1 facet joint. Aspiration was negative. Once proper placement was confirmed, 1 ml of 0.25% Marcaine and 0.5 mL 40 mg/mL kenalog were injected through the spinal needle.     The above procedure was then repeated through the rightL4 - L5 and right L3 - L4 facet joints.      Fluoroscopic guidance was used intermittently over a 10-minute period to insure proper needle placement and her safety. A hard copy of the fluoroscopic images has been placed in her chart and is saved on the C-arm hard drive. She was monitored for 30 minutes after the procedure and discharged home in a stable fashion with a routine follow up.     Procedural Diagnosis:     ICD-10-CM    1. Spondylolisthesis of lumbar region  M43.16 triamcinolone acetonide (KENALOG-40) injection 40 mg     FL INJ LUMB/SAC FACET SINGLE LEVEL     XR INJ FACET LUMB SACRAL 2ND LVL      2. Facet arthropathy, lumbar  M47.816 triamcinolone acetonide (KENALOG-40) injection 40 mg     FL INJ LUMB/SAC FACET SINGLE LEVEL     XR INJ FACET LUMB SACRAL 2ND LVL      3. DDD (degenerative disc disease), lumbar  M51.36 triamcinolone acetonide (KENALOG-40) injection 40 mg     FL INJ LUMB/SAC FACET SINGLE LEVEL     XR INJ FACET LUMB SACRAL 2ND LVL           PAVITHRA ARMAS MD  02/05/24

## 2024-02-19 NOTE — PROGRESS NOTES
[facility]  Consultation Note    Patient ID:  Name: Daja Armas  MRN: [id number]  AGE: 64 y.o.  : 1960    Date of Consultation:  2024    CC:   Chief Complaint   Patient presents with    Back Pain         HPI:  Ms. Armas is a 64-year-old female who presents today for evaluation of low back pain.  She saw our surgical spine team recently, who recommended nonsurgical treatment for her pain at this time.  She has a history of rheumatoid arthritis.  She has pain in the left low back.  She previously had some discomfort on the right side, but that has improved with right L3-4-5-S1 facet joint injections 2024.  She did get temporary relief of her usual left low back discomfort with left L3-4-5-S1 facet joint injection 2024.  However, the pain has returned.  It is aggravated with walking.  Her walking tolerance is limited by pain.  The pain does not radiate to the legs or feet.  There are no lower extremity paresthesias.  The pain does not awaken her at night.  She feels some relief when she leans toward the right to offload the left side.  There is no pain in the groin.    X-rays of the lumbar spine were taken by another provider 2023.  On my review of the films, she has scoliosis, multilevel DDD, spondylosis, facet arthropathy, anterolisthesis L3-4-5.  MRI lumbar spine was performed by another provider January 3, 2024.  On my review of the films, she has anterolisthesis L3-4-5-S1 with lumbar scoliosis and multilevel degenerative change.  At L3-4, she has a left-sided facet synovial cyst.  There is bilateral lateral recess narrowing at L3-4.     Past Medical History Includes:   Past Medical History:   Diagnosis Date    Allergic rhinitis     Anterior soft tissue impingement 9/10/2015    Arthritis     back    Chronic pain     back & left leg    Complete tear of right rotator cuff     DDD (degenerative disc disease), lumbar     pt states

## 2024-02-20 ENCOUNTER — OFFICE VISIT (OUTPATIENT)
Dept: ORTHOPEDIC SURGERY | Age: 64
End: 2024-02-20
Payer: COMMERCIAL

## 2024-02-20 DIAGNOSIS — M47.816 FACET ARTHROPATHY, LUMBAR: Primary | ICD-10-CM

## 2024-02-20 DIAGNOSIS — M51.36 DISC DEGENERATION, LUMBAR: ICD-10-CM

## 2024-02-20 DIAGNOSIS — M47.816 LUMBAR SPONDYLOSIS: ICD-10-CM

## 2024-02-20 PROCEDURE — 99214 OFFICE O/P EST MOD 30 MIN: CPT | Performed by: PHYSICAL MEDICINE & REHABILITATION

## 2024-02-29 ENCOUNTER — OFFICE VISIT (OUTPATIENT)
Dept: ORTHOPEDIC SURGERY | Age: 64
End: 2024-02-29
Payer: COMMERCIAL

## 2024-02-29 DIAGNOSIS — M47.816 FACET ARTHROPATHY, LUMBAR: Primary | ICD-10-CM

## 2024-02-29 PROCEDURE — 64494 INJ PARAVERT F JNT L/S 2 LEV: CPT | Performed by: PHYSICAL MEDICINE & REHABILITATION

## 2024-02-29 PROCEDURE — 64495 INJ PARAVERT F JNT L/S 3 LEV: CPT | Performed by: PHYSICAL MEDICINE & REHABILITATION

## 2024-02-29 PROCEDURE — 64493 INJ PARAVERT F JNT L/S 1 LEV: CPT | Performed by: PHYSICAL MEDICINE & REHABILITATION

## 2024-02-29 RX ORDER — TRIAMCINOLONE ACETONIDE 40 MG/ML
40 INJECTION, SUSPENSION INTRA-ARTICULAR; INTRAMUSCULAR ONCE
Status: COMPLETED | OUTPATIENT
Start: 2024-02-29 | End: 2024-02-29

## 2024-02-29 RX ADMIN — TRIAMCINOLONE ACETONIDE 40 MG: 40 INJECTION, SUSPENSION INTRA-ARTICULAR; INTRAMUSCULAR at 15:47

## 2024-02-29 NOTE — PROGRESS NOTES
Name: Daja Armas  YOB: 1960  Gender: female  MRN: [id number]    Procedure: Left  L3-L4, L4-L5, and L5-S1 facet joint injections     Precautions: Daja Armas deniesprior sensitivity to steroid, local anesthetic, iodine, or shellfish.     The procedure was discussed at length with her and informed consent was signed and placed in the chart. She was placed in a prone position on the fluoroscopy table and the skin was prepped and draped in a routine sterile fashion. The areas to be injected were each anesthetized with 1% lidocaine.    Next, a 25-gauge 3.5 inch spinal needle was carefully advanced under fluoroscopic guidance to the leftL5 - S1 facet joint. Aspiration was negative. Once proper placement was confirmed, 1 ml of 0.25% Marcaine and  40 mg kenalog were injected through the spinal needle.     The above procedure was then repeated through the leftL4 - L5 and left L3-L4 facet joints.      Fluoroscopic guidance was used intermittently over a 10-minute period to insure proper needle placement and her safety. A hard copy of the fluoroscopic images has been placed in her chart and is saved on the C-arm hard drive. She was monitored for 30 minutes after the procedure and discharged home in a stable fashion with a routine follow up.     Procedural Diagnosis:     ICD-10-CM    1. Facet arthropathy, lumbar  M47.816 FL INJ LUMB/SAC FACET SINGLE LEVEL     XR INJ FACET LUMB SACRAL 2ND LVL     triamcinolone acetonide (KENALOG-40) injection 40 mg     Injection Authorization - Spine           PAVITHRA ARMAS MD  02/29/24

## 2024-04-04 ENCOUNTER — OFFICE VISIT (OUTPATIENT)
Dept: ORTHOPEDIC SURGERY | Age: 64
End: 2024-04-04

## 2024-04-04 DIAGNOSIS — M47.816 FACET ARTHROPATHY, LUMBAR: Primary | ICD-10-CM

## 2024-04-04 RX ORDER — TRIAMCINOLONE ACETONIDE 40 MG/ML
40 INJECTION, SUSPENSION INTRA-ARTICULAR; INTRAMUSCULAR ONCE
Status: COMPLETED | OUTPATIENT
Start: 2024-04-04 | End: 2024-04-04

## 2024-04-04 RX ADMIN — TRIAMCINOLONE ACETONIDE 40 MG: 40 INJECTION, SUSPENSION INTRA-ARTICULAR; INTRAMUSCULAR at 14:13

## 2024-04-04 NOTE — PROGRESS NOTES
Name: Daja Armas  YOB: 1960  Gender: female  MRN: [id number]    PROCEDURE: Left  L3, L4, L5, and S1 medial branch nerve/facet joint radiofrequency denervation     Precautions:  Daja Armas denies prior sensitivity to steroid, local anesthetic, iodine, or shellfish.     The procedure was discussed at length with the patient and informed consent was signed and placed in the chart.  She had great relief of her usual low back pain from previous L3-L4, L4-L5, and L5-S1 left facet joint injections.    The patient was placed in a prone position on the fluoroscopy table and the skin was prepped and draped in a routine sterile fashion with Hibaclens.  The area where the medial branch nerve lies for Left L3 was identified under fluoroscopy, and the area to be injected was anesthetized with 1 mL of 1% Lidocaine.  A 20 gauge 10 cm Neurotherm radiofrequency needle was carefully advanced under fluoroscopic guidance to area where the medial branch nerve for Left L3 lies.  Bony contact was made.  Testing for motor and sensory was negative into the lower extremities and groin to 3 volts.  Motor onset was 0.5 volts. Aspiration was negative.      An injectate of 1 mL of 0.25% marcaine was injected.  Lesioning was carried out for 60 seconds at 80 degrees celsius. The probe was then rotated 180 degrees, and lesioning was repeated. The above procedure was repeated at the levels of the Left  L4, L5, and S1 medial branch nerves.      At the level of Left  L4 medial branch nerve, motor onset was 0.5 volts. Testing for motor and sensory was negative into the lower extremities and groin to 3.0 volts at Left L4 medial branch nerve.    At the level of Left L5 medial branch nerve, motor onset was 0.6 volts. Testing for motor and sensory was negative into the lower extremities and groin to 3.0 volts at Left  L5 medial branch nerve.    At the level of Left S1 medial branch nerve, motor onset was 0.8 volts. Testing for

## 2024-11-25 ENCOUNTER — TELEPHONE (OUTPATIENT)
Dept: ORTHOPEDIC SURGERY | Age: 64
End: 2024-11-25

## 2024-11-25 DIAGNOSIS — M47.816 FACET ARTHROPATHY, LUMBAR: Primary | ICD-10-CM

## 2024-11-26 NOTE — TELEPHONE ENCOUNTER
Call returned to patient and she would like to repeat the RFA again with Dr. Armas. Appt has been scheduled.

## 2024-12-09 ENCOUNTER — OFFICE VISIT (OUTPATIENT)
Dept: ORTHOPEDIC SURGERY | Age: 64
End: 2024-12-09
Payer: COMMERCIAL

## 2024-12-09 DIAGNOSIS — M47.816 FACET ARTHROPATHY, LUMBAR: Primary | ICD-10-CM

## 2024-12-09 PROCEDURE — 64635 DESTROY LUMB/SAC FACET JNT: CPT | Performed by: PHYSICAL MEDICINE & REHABILITATION

## 2024-12-09 PROCEDURE — 64636 DESTROY L/S FACET JNT ADDL: CPT | Performed by: PHYSICAL MEDICINE & REHABILITATION

## 2024-12-09 RX ORDER — TRIAMCINOLONE ACETONIDE 40 MG/ML
40 INJECTION, SUSPENSION INTRA-ARTICULAR; INTRAMUSCULAR ONCE
Status: COMPLETED | OUTPATIENT
Start: 2024-12-09 | End: 2024-12-09

## 2024-12-09 RX ADMIN — TRIAMCINOLONE ACETONIDE 40 MG: 40 INJECTION, SUSPENSION INTRA-ARTICULAR; INTRAMUSCULAR at 17:22

## 2024-12-16 ENCOUNTER — TELEPHONE (OUTPATIENT)
Dept: ORTHOPEDIC SURGERY | Age: 64
End: 2024-12-16

## 2024-12-16 DIAGNOSIS — M47.816 LUMBAR SPONDYLOSIS: Primary | ICD-10-CM

## 2024-12-16 DIAGNOSIS — M54.16 LUMBAR RADICULOPATHY: ICD-10-CM

## 2024-12-16 DIAGNOSIS — M43.16 SPONDYLOLISTHESIS OF LUMBAR REGION: ICD-10-CM

## 2024-12-16 DIAGNOSIS — M47.816 FACET ARTHROPATHY, LUMBAR: ICD-10-CM

## 2024-12-16 NOTE — TELEPHONE ENCOUNTER
Patient states that there is still one nerve that is hurting down her leg and wants to come in for another injection for that nerve.

## 2024-12-17 NOTE — TELEPHONE ENCOUNTER
Spoke with patient and she states that that the pain is starting lower back pain down the left hip into the back and side of the leg stopping at thigh.

## 2024-12-18 NOTE — TELEPHONE ENCOUNTER
Call returned to patient and she states that her pain is all left sided. Appointment for the injection has been scheduled.

## 2025-01-06 ENCOUNTER — OFFICE VISIT (OUTPATIENT)
Dept: ORTHOPEDIC SURGERY | Age: 65
End: 2025-01-06
Payer: COMMERCIAL

## 2025-01-06 DIAGNOSIS — M54.16 LUMBAR RADICULOPATHY: Primary | ICD-10-CM

## 2025-01-06 PROCEDURE — 64483 NJX AA&/STRD TFRM EPI L/S 1: CPT | Performed by: PHYSICAL MEDICINE & REHABILITATION

## 2025-01-06 RX ORDER — TRIAMCINOLONE ACETONIDE 40 MG/ML
40 INJECTION, SUSPENSION INTRA-ARTICULAR; INTRAMUSCULAR ONCE
Status: COMPLETED | OUTPATIENT
Start: 2025-01-06 | End: 2025-01-06

## 2025-01-06 RX ADMIN — TRIAMCINOLONE ACETONIDE 40 MG: 40 INJECTION, SUSPENSION INTRA-ARTICULAR; INTRAMUSCULAR at 16:16

## 2025-01-06 NOTE — PROGRESS NOTES
Name: Daja Armas  YOB: 1960  Gender: female  MRN: [id number]        Transforaminal JENY Procedure Note    Procedure: Left  L3-L4 transforaminal epidural steroid injections     Precautions: Daja Armas denies prior sensitivity to steroid, local anesthetic, iodine, or shellfish.       Consent:  Consent was obtained prior to the procedure. The procedure was discussed at length with Daja Armas. She was given the opportunity to ask questions regarding the procedure and its associated risks.  In addition to the potential risks associated with the procedure itself, the patient was informed both verbally and in writing of potential side effects of the use glucocorticoids.  The patient appeared to comprehend the informed consent and desired to have the procedure performed, and informed consent was signed.     She was placed in a prone position on the fluoroscopy table and the skin was prepped and draped in a routine sterile fashion. The areas to be injected were each anesthetized with 1 ml of 1% Lidocaine. A 22 gauge 5 inch spinal needle was carefully advanced under fluoroscopic guidance to the left L3-L4 transforaminal space  0.5 ml of 70% of Omnipaque was injected to confirm proper needle placement and absence of subdural or vascular flow Once proper placement was confirmed, 0.5ml of 0.25 marcaine and 40 mg kenalog were injected through the spinal needle.     Fluoroscopic guidance was used intermittently over a 10-minute period to insure proper needle placement and her safety. A hard copy of the fluoroscopic image has been placed in her chart and is saved on the C-arm hard drive. She was monitored for 30 minutes after the procedure and discharged home in a stable fashion with a routine follow up.    Procedural Diagnosis:     ICD-10-CM    1. Lumbar radiculopathy  M54.16 FL NERVE BLOCK LUMBOSACRAL 1ST     triamcinolone acetonide (KENALOG-40) injection 40 mg         PAVITHRA ARMAS,

## 2025-02-03 NOTE — PROGRESS NOTES
tissue impingement 9/10/2015    Arthritis     back    Chronic pain     back & left leg    Complete tear of right rotator cuff     DDD (degenerative disc disease), lumbar     pt states has 2 bulging disc in lower back that are pinching nerves    History of migraine     ALLERGY RELATED    Kidney stone     Morbid obesity 10/30/15    BMI 41.7    Nausea & vomiting     \"patch placed behind ear\" help prevent nausea    Severe motion sickness    ,   Past Surgical History:   Procedure Laterality Date    CHOLECYSTECTOMY  1981    FRAGMENT KIDNEY STONE/ [facility]      KNEE ARTHROSCOPY Right 1991    ORTHOPEDIC SURGERY Left 2018    torn meniscues tear left knee    ORTHOPEDIC SURGERY Right 2004    wrist    OTHER SURGICAL HISTORY  2011    cauterization of nerves to left leg    ROTATOR CUFF REPAIR Right 11/2015    SINUS SURGERY PROC UNLISTED  2014    Dr. Farooq    TONSILLECTOMY       Family History:   Family History   Problem Relation Age of Onset    Hypertension Mother     Diabetes Father     Diabetes Brother     Diabetes Brother     Diabetes Brother     Breast Cancer Neg Hx       Social History:   Social History     Tobacco Use    Smoking status: Never    Smokeless tobacco: Never   Substance Use Topics    Alcohol use: Not Currently       ALLERGIES:   Allergies   Allergen Reactions    Codeine Nausea And Vomiting and Nausea Only     Pt states \"a lot of pain medications maker her sick\"    Penicillins Nausea And Vomiting and Nausea Only     Pt states possible allergy to PCN        Patient Medications    Current Outpatient Medications   Medication Sig    tiZANidine (ZANAFLEX) 4 MG tablet Take 1 tablet by mouth 3 times daily as needed (pain)    amLODIPine (NORVASC) 10 MG tablet Take 1 tablet by mouth daily    certolizumab pegol (CIMZIA) 2 X 200 MG/ML PSKT injection     Docusate Sodium (DSS) 250 MG CAPS Take 250 mg by mouth every evening    DULoxetine (CYMBALTA) 30 MG extended release capsule TAKE 1 CAPSULE (30 MG TOTAL) BY MOUTH IN THE MORNING

## 2025-02-04 ENCOUNTER — OFFICE VISIT (OUTPATIENT)
Dept: ORTHOPEDIC SURGERY | Age: 65
End: 2025-02-04
Payer: COMMERCIAL

## 2025-02-04 VITALS — BODY MASS INDEX: 40.02 KG/M2 | HEIGHT: 67 IN | WEIGHT: 255 LBS

## 2025-02-04 DIAGNOSIS — M43.16 SPONDYLOLISTHESIS OF LUMBAR REGION: ICD-10-CM

## 2025-02-04 DIAGNOSIS — M54.16 LUMBAR RADICULOPATHY: Primary | ICD-10-CM

## 2025-02-04 DIAGNOSIS — M47.816 LUMBAR SPONDYLOSIS: ICD-10-CM

## 2025-02-04 DIAGNOSIS — M47.816 FACET ARTHROPATHY, LUMBAR: ICD-10-CM

## 2025-02-04 PROCEDURE — 99214 OFFICE O/P EST MOD 30 MIN: CPT | Performed by: PHYSICAL MEDICINE & REHABILITATION

## 2025-02-04 PROCEDURE — 1123F ACP DISCUSS/DSCN MKR DOCD: CPT | Performed by: PHYSICAL MEDICINE & REHABILITATION

## 2025-02-12 ENCOUNTER — PATIENT MESSAGE (OUTPATIENT)
Dept: ORTHOPEDIC SURGERY | Age: 65
End: 2025-02-12

## 2025-02-12 ENCOUNTER — TELEPHONE (OUTPATIENT)
Dept: ORTHOPEDIC SURGERY | Age: 65
End: 2025-02-12

## 2025-02-12 NOTE — TELEPHONE ENCOUNTER
Please advise if the MRI images and report have been received to schedule a virtual visit appointment.  Thank you.

## 2025-02-17 NOTE — PROGRESS NOTES
[facility]  Consultation Note  Virtual/Telephone Visit     Patient ID:  Name: Daja Armas  MRN: [id number]  AGE: 65 y.o.  : 1960    Date of Consultation:  2025    CC:   Chief Complaint   Patient presents with    Back Pain         HPI:  Ms. Armas is a 65-year-old female who presents today for follow-up of low back pain.  MRI lumbar spine was performed 2025.  I did review these films.  She has anterolisthesis L3-4-5-S1.  She has facet synovitis bilaterally at L2-3-4.  There is a disc protrusion at L3-4.  It is central and left paracentral and results and bilateral subarticular zone and lateral recess narrowing.  Not mentioned on the report, on my review of the films, it looks as though it may be affecting the L4 nerve root as it exits the spinal cord at L3-4.  She has bilateral L4-5 neuroforaminal narrowing, left greater than right. The facet synovial cyst that was present on the MRI lumbar spine  is not apparent on this MRI. We discussed these findings at length today.    Over the last few months, she has had some relief with lumbar injections, but the pain still is quite severe.  The pain is aggravated when she stands up straight.  She has been using a walker over the last several weeks because of the pain.  That is a significant decline in her function.  Her pain is also aggravated when she leans back on a couch.  It is mildly alleviated with heat.     Past Medical History Includes:   Past Medical History:   Diagnosis Date    Allergic rhinitis     Anterior soft tissue impingement 9/10/2015    Arthritis     back    Chronic pain     back & left leg    Complete tear of right rotator cuff     DDD (degenerative disc disease), lumbar     pt states has 2 bulging disc in lower back that are pinching nerves    History of migraine     ALLERGY RELATED    Kidney stone     Morbid obesity 10/30/15    BMI 41.7    Nausea & vomiting     \"patch placed behind ear\" help

## 2025-02-21 ENCOUNTER — TELEMEDICINE (OUTPATIENT)
Dept: ORTHOPEDIC SURGERY | Age: 65
End: 2025-02-21
Payer: COMMERCIAL

## 2025-02-21 DIAGNOSIS — M47.816 LUMBAR SPONDYLOSIS: Primary | ICD-10-CM

## 2025-02-21 DIAGNOSIS — M43.10 ACQUIRED SPONDYLOLISTHESIS: ICD-10-CM

## 2025-02-21 DIAGNOSIS — M54.16 LUMBAR RADICULOPATHY: ICD-10-CM

## 2025-02-21 DIAGNOSIS — M51.362 DEGENERATION OF INTERVERTEBRAL DISC OF LUMBAR REGION WITH DISCOGENIC BACK PAIN AND LOWER EXTREMITY PAIN: ICD-10-CM

## 2025-02-21 PROCEDURE — 99214 OFFICE O/P EST MOD 30 MIN: CPT | Performed by: PHYSICAL MEDICINE & REHABILITATION

## 2025-02-21 PROCEDURE — 1123F ACP DISCUSS/DSCN MKR DOCD: CPT | Performed by: PHYSICAL MEDICINE & REHABILITATION

## 2025-03-19 ENCOUNTER — APPOINTMENT (OUTPATIENT)
Dept: CT IMAGING | Age: 65
DRG: 871 | End: 2025-03-19
Payer: COMMERCIAL

## 2025-03-19 ENCOUNTER — HOSPITAL ENCOUNTER (INPATIENT)
Age: 65
LOS: 6 days | Discharge: HOME HEALTH | DRG: 871 | End: 2025-03-25
Attending: EMERGENCY MEDICINE | Admitting: FAMILY MEDICINE
Payer: COMMERCIAL

## 2025-03-19 ENCOUNTER — APPOINTMENT (OUTPATIENT)
Dept: GENERAL RADIOLOGY | Age: 65
DRG: 871 | End: 2025-03-19
Payer: COMMERCIAL

## 2025-03-19 DIAGNOSIS — J30.9 ALLERGIC RHINITIS, UNSPECIFIED SEASONALITY, UNSPECIFIED TRIGGER: ICD-10-CM

## 2025-03-19 DIAGNOSIS — G93.41 SEPSIS WITH ENCEPHALOPATHY WITHOUT SEPTIC SHOCK, DUE TO UNSPECIFIED ORGANISM (HCC): Primary | ICD-10-CM

## 2025-03-19 DIAGNOSIS — A41.9 SEPSIS WITH ENCEPHALOPATHY WITHOUT SEPTIC SHOCK, DUE TO UNSPECIFIED ORGANISM (HCC): Primary | ICD-10-CM

## 2025-03-19 DIAGNOSIS — B95.61 MSSA BACTEREMIA: ICD-10-CM

## 2025-03-19 DIAGNOSIS — R65.20 SEPSIS WITH ENCEPHALOPATHY WITHOUT SEPTIC SHOCK, DUE TO UNSPECIFIED ORGANISM (HCC): Primary | ICD-10-CM

## 2025-03-19 DIAGNOSIS — N39.0 ACUTE UTI: ICD-10-CM

## 2025-03-19 DIAGNOSIS — R78.81 MSSA BACTEREMIA: ICD-10-CM

## 2025-03-19 PROBLEM — K21.9 GERD (GASTROESOPHAGEAL REFLUX DISEASE): Status: ACTIVE | Noted: 2025-03-19

## 2025-03-19 PROBLEM — M51.362 DEGENERATION OF INTERVERTEBRAL DISC OF LUMBAR REGION WITH DISCOGENIC BACK PAIN AND LOWER EXTREMITY PAIN: Status: ACTIVE | Noted: 2025-03-19

## 2025-03-19 PROBLEM — E78.5 HYPERLIPIDEMIA: Status: ACTIVE | Noted: 2025-03-19

## 2025-03-19 PROBLEM — M43.10 ACQUIRED SPONDYLOLISTHESIS: Status: ACTIVE | Noted: 2025-03-19

## 2025-03-19 PROBLEM — M06.9 RA (RHEUMATOID ARTHRITIS) (HCC): Status: ACTIVE | Noted: 2025-03-19

## 2025-03-19 PROBLEM — E11.9 DM TYPE 2 (DIABETES MELLITUS, TYPE 2) (HCC): Status: ACTIVE | Noted: 2025-03-19

## 2025-03-19 PROBLEM — M47.816 LUMBAR SPONDYLOSIS: Status: ACTIVE | Noted: 2025-03-19

## 2025-03-19 PROBLEM — M54.16 LUMBAR RADICULOPATHY: Status: ACTIVE | Noted: 2025-03-19

## 2025-03-19 LAB
ALBUMIN SERPL-MCNC: 2.9 G/DL (ref 3.2–4.6)
ALBUMIN/GLOB SERPL: 0.8 (ref 1–1.9)
ALP SERPL-CCNC: 90 U/L (ref 35–104)
ALT SERPL-CCNC: 45 U/L (ref 8–45)
AMMONIA PLAS-SCNC: 23 UMOL/L (ref 11–51)
AMORPH CRY URNS QL MICRO: ABNORMAL
AMPHET UR QL SCN: NEGATIVE
ANION GAP SERPL CALC-SCNC: 18 MMOL/L (ref 7–16)
APAP SERPL-MCNC: <5 UG/ML (ref 10–30)
APPEARANCE UR: ABNORMAL
AST SERPL-CCNC: 132 U/L (ref 15–37)
BACTERIA URNS QL MICRO: ABNORMAL /HPF
BARBITURATES UR QL SCN: NEGATIVE
BASOPHILS # BLD: 0.03 K/UL (ref 0–0.2)
BASOPHILS NFR BLD: 0.2 % (ref 0–2)
BENZODIAZ UR QL: NEGATIVE
BILIRUB SERPL-MCNC: 0.4 MG/DL (ref 0–1.2)
BILIRUB UR QL: ABNORMAL
BUN SERPL-MCNC: 19 MG/DL (ref 8–23)
CALCIUM SERPL-MCNC: 8.6 MG/DL (ref 8.8–10.2)
CANNABINOIDS UR QL SCN: NEGATIVE
CASTS URNS QL MICRO: ABNORMAL /LPF
CHLORIDE SERPL-SCNC: 100 MMOL/L (ref 98–107)
CO2 SERPL-SCNC: 22 MMOL/L (ref 20–29)
COCAINE UR QL SCN: NEGATIVE
COLOR UR: ABNORMAL
CREAT SERPL-MCNC: 1.39 MG/DL (ref 0.6–1.1)
DIFFERENTIAL METHOD BLD: ABNORMAL
EKG ATRIAL RATE: 108 BPM
EKG DIAGNOSIS: NORMAL
EKG P AXIS: -1 DEGREES
EKG P-R INTERVAL: 137 MS
EKG Q-T INTERVAL: 366 MS
EKG QRS DURATION: 93 MS
EKG QTC CALCULATION (BAZETT): 491 MS
EKG R AXIS: 48 DEGREES
EKG T AXIS: 31 DEGREES
EKG VENTRICULAR RATE: 108 BPM
EOSINOPHIL # BLD: 0 K/UL (ref 0–0.8)
EOSINOPHIL NFR BLD: 0 % (ref 0.5–7.8)
EPI CELLS #/AREA URNS HPF: ABNORMAL /HPF
ERYTHROCYTE [DISTWIDTH] IN BLOOD BY AUTOMATED COUNT: 15.3 % (ref 11.9–14.6)
ETHANOL SERPL-MCNC: <11 MG/DL (ref 0–0.08)
FLUAV RNA SPEC QL NAA+PROBE: NOT DETECTED
FLUBV RNA SPEC QL NAA+PROBE: NOT DETECTED
GLOBULIN SER CALC-MCNC: 3.6 G/DL (ref 2.3–3.5)
GLUCOSE BLD STRIP.AUTO-MCNC: 130 MG/DL (ref 65–100)
GLUCOSE SERPL-MCNC: 156 MG/DL (ref 70–99)
GLUCOSE UR STRIP.AUTO-MCNC: >1000 MG/DL
HCT VFR BLD AUTO: 40.3 % (ref 35.8–46.3)
HGB BLD-MCNC: 13.6 G/DL (ref 11.7–15.4)
HGB UR QL STRIP: ABNORMAL
IMM GRANULOCYTES # BLD AUTO: 0.08 K/UL (ref 0–0.5)
IMM GRANULOCYTES NFR BLD AUTO: 0.6 % (ref 0–5)
KETONES UR QL STRIP.AUTO: 15 MG/DL
LACTATE SERPL-SCNC: 1.3 MMOL/L (ref 0.5–2)
LACTATE SERPL-SCNC: 3.3 MMOL/L (ref 0.5–2)
LEUKOCYTE ESTERASE UR QL STRIP.AUTO: ABNORMAL
LYMPHOCYTES # BLD: 1.19 K/UL (ref 0.5–4.6)
LYMPHOCYTES NFR BLD: 9.3 % (ref 13–44)
MCH RBC QN AUTO: 30.4 PG (ref 26.1–32.9)
MCHC RBC AUTO-ENTMCNC: 33.7 G/DL (ref 31.4–35)
MCV RBC AUTO: 90.2 FL (ref 82–102)
METHADONE UR QL: NEGATIVE
MONOCYTES # BLD: 1.37 K/UL (ref 0.1–1.3)
MONOCYTES NFR BLD: 10.7 % (ref 4–12)
MUCOUS THREADS URNS QL MICRO: 0 /LPF
NEUTS SEG # BLD: 10.14 K/UL (ref 1.7–8.2)
NEUTS SEG NFR BLD: 79.2 % (ref 43–78)
NITRITE UR QL STRIP.AUTO: NEGATIVE
NRBC # BLD: 0 K/UL (ref 0–0.2)
OPIATES UR QL: NEGATIVE
OTHER OBSERVATIONS: ABNORMAL
PCP UR QL: NEGATIVE
PH UR STRIP: 5.5 (ref 5–9)
PLATELET # BLD AUTO: 196 K/UL (ref 150–450)
PLATELET COMMENT: ADEQUATE
PMV BLD AUTO: 11.6 FL (ref 9.4–12.3)
POTASSIUM SERPL-SCNC: 3.9 MMOL/L (ref 3.5–5.1)
PROCALCITONIN SERPL-MCNC: 3.18 NG/ML (ref 0–0.1)
PROT SERPL-MCNC: 6.6 G/DL (ref 6.3–8.2)
PROT UR STRIP-MCNC: 300 MG/DL
RBC # BLD AUTO: 4.47 M/UL (ref 4.05–5.2)
RBC #/AREA URNS HPF: ABNORMAL /HPF
RBC MORPH BLD: ABNORMAL
SALICYLATES SERPL-MCNC: <0.5 MG/DL
SARS-COV-2 RDRP RESP QL NAA+PROBE: NOT DETECTED
SERVICE CMNT-IMP: ABNORMAL
SODIUM SERPL-SCNC: 140 MMOL/L (ref 136–145)
SOURCE: NORMAL
SP GR UR REFRACTOMETRY: 1.02 (ref 1–1.02)
TROPONIN T SERPL HS-MCNC: 34 NG/L (ref 0–14)
TSH W FREE THYROID IF ABNORMAL: 3.44 UIU/ML (ref 0.27–4.2)
UROBILINOGEN UR QL STRIP.AUTO: 1 EU/DL (ref 0.2–1)
WBC # BLD AUTO: 12.8 K/UL (ref 4.3–11.1)
WBC MORPH BLD: ABNORMAL
WBC URNS QL MICRO: >100 /HPF

## 2025-03-19 PROCEDURE — 83605 ASSAY OF LACTIC ACID: CPT

## 2025-03-19 PROCEDURE — 2580000003 HC RX 258: Performed by: FAMILY MEDICINE

## 2025-03-19 PROCEDURE — 6360000002 HC RX W HCPCS: Performed by: EMERGENCY MEDICINE

## 2025-03-19 PROCEDURE — 85025 COMPLETE CBC W/AUTO DIFF WBC: CPT

## 2025-03-19 PROCEDURE — 87086 URINE CULTURE/COLONY COUNT: CPT

## 2025-03-19 PROCEDURE — 6370000000 HC RX 637 (ALT 250 FOR IP): Performed by: FAMILY MEDICINE

## 2025-03-19 PROCEDURE — 84145 PROCALCITONIN (PCT): CPT

## 2025-03-19 PROCEDURE — 87040 BLOOD CULTURE FOR BACTERIA: CPT

## 2025-03-19 PROCEDURE — 81001 URINALYSIS AUTO W/SCOPE: CPT

## 2025-03-19 PROCEDURE — 80143 DRUG ASSAY ACETAMINOPHEN: CPT

## 2025-03-19 PROCEDURE — 84443 ASSAY THYROID STIM HORMONE: CPT

## 2025-03-19 PROCEDURE — 84484 ASSAY OF TROPONIN QUANT: CPT

## 2025-03-19 PROCEDURE — 6370000000 HC RX 637 (ALT 250 FOR IP): Performed by: EMERGENCY MEDICINE

## 2025-03-19 PROCEDURE — 87205 SMEAR GRAM STAIN: CPT

## 2025-03-19 PROCEDURE — 36415 COLL VENOUS BLD VENIPUNCTURE: CPT

## 2025-03-19 PROCEDURE — 71045 X-RAY EXAM CHEST 1 VIEW: CPT

## 2025-03-19 PROCEDURE — 2580000003 HC RX 258: Performed by: EMERGENCY MEDICINE

## 2025-03-19 PROCEDURE — 82077 ASSAY SPEC XCP UR&BREATH IA: CPT

## 2025-03-19 PROCEDURE — 93010 ELECTROCARDIOGRAM REPORT: CPT | Performed by: INTERNAL MEDICINE

## 2025-03-19 PROCEDURE — 87154 CUL TYP ID BLD PTHGN 6+ TRGT: CPT

## 2025-03-19 PROCEDURE — 93005 ELECTROCARDIOGRAM TRACING: CPT | Performed by: EMERGENCY MEDICINE

## 2025-03-19 PROCEDURE — 6360000002 HC RX W HCPCS: Performed by: FAMILY MEDICINE

## 2025-03-19 PROCEDURE — 87186 SC STD MICRODIL/AGAR DIL: CPT

## 2025-03-19 PROCEDURE — 80053 COMPREHEN METABOLIC PANEL: CPT

## 2025-03-19 PROCEDURE — 82140 ASSAY OF AMMONIA: CPT

## 2025-03-19 PROCEDURE — 99285 EMERGENCY DEPT VISIT HI MDM: CPT

## 2025-03-19 PROCEDURE — 2500000003 HC RX 250 WO HCPCS: Performed by: EMERGENCY MEDICINE

## 2025-03-19 PROCEDURE — 81015 MICROSCOPIC EXAM OF URINE: CPT

## 2025-03-19 PROCEDURE — 6370000000 HC RX 637 (ALT 250 FOR IP): Performed by: NURSE PRACTITIONER

## 2025-03-19 PROCEDURE — 70450 CT HEAD/BRAIN W/O DYE: CPT

## 2025-03-19 PROCEDURE — 87088 URINE BACTERIA CULTURE: CPT

## 2025-03-19 PROCEDURE — 80179 DRUG ASSAY SALICYLATE: CPT

## 2025-03-19 PROCEDURE — 1100000003 HC PRIVATE W/ TELEMETRY

## 2025-03-19 PROCEDURE — 87077 CULTURE AEROBIC IDENTIFY: CPT

## 2025-03-19 PROCEDURE — 2500000003 HC RX 250 WO HCPCS: Performed by: FAMILY MEDICINE

## 2025-03-19 PROCEDURE — 96374 THER/PROPH/DIAG INJ IV PUSH: CPT

## 2025-03-19 PROCEDURE — 96375 TX/PRO/DX INJ NEW DRUG ADDON: CPT

## 2025-03-19 PROCEDURE — 80307 DRUG TEST PRSMV CHEM ANLYZR: CPT

## 2025-03-19 PROCEDURE — 82962 GLUCOSE BLOOD TEST: CPT

## 2025-03-19 PROCEDURE — 87636 SARSCOV2 & INF A&B AMP PRB: CPT

## 2025-03-19 RX ORDER — PREGABALIN 75 MG/1
150 CAPSULE ORAL 2 TIMES DAILY
Status: DISCONTINUED | OUTPATIENT
Start: 2025-03-19 | End: 2025-03-25 | Stop reason: HOSPADM

## 2025-03-19 RX ORDER — ACETAMINOPHEN 650 MG/1
650 SUPPOSITORY RECTAL EVERY 6 HOURS PRN
Status: DISCONTINUED | OUTPATIENT
Start: 2025-03-19 | End: 2025-03-25 | Stop reason: HOSPADM

## 2025-03-19 RX ORDER — SODIUM CHLORIDE 9 MG/ML
INJECTION, SOLUTION INTRAVENOUS CONTINUOUS
Status: DISCONTINUED | OUTPATIENT
Start: 2025-03-19 | End: 2025-03-21

## 2025-03-19 RX ORDER — VITAMIN B COMPLEX
5000 TABLET ORAL DAILY
Status: DISCONTINUED | OUTPATIENT
Start: 2025-03-19 | End: 2025-03-25 | Stop reason: HOSPADM

## 2025-03-19 RX ORDER — PREDNISONE 5 MG/1
5 TABLET ORAL 2 TIMES DAILY
Status: DISCONTINUED | OUTPATIENT
Start: 2025-03-19 | End: 2025-03-25 | Stop reason: HOSPADM

## 2025-03-19 RX ORDER — CETIRIZINE HYDROCHLORIDE 10 MG/1
10 TABLET ORAL NIGHTLY
Status: DISCONTINUED | OUTPATIENT
Start: 2025-03-19 | End: 2025-03-25 | Stop reason: HOSPADM

## 2025-03-19 RX ORDER — ACETAMINOPHEN 325 MG/1
650 TABLET ORAL EVERY 6 HOURS PRN
Status: DISCONTINUED | OUTPATIENT
Start: 2025-03-19 | End: 2025-03-25 | Stop reason: HOSPADM

## 2025-03-19 RX ORDER — ENOXAPARIN SODIUM 100 MG/ML
30 INJECTION SUBCUTANEOUS 2 TIMES DAILY
Status: DISCONTINUED | OUTPATIENT
Start: 2025-03-19 | End: 2025-03-25 | Stop reason: HOSPADM

## 2025-03-19 RX ORDER — ACETAMINOPHEN 500 MG
1000 TABLET ORAL
Status: COMPLETED | OUTPATIENT
Start: 2025-03-19 | End: 2025-03-19

## 2025-03-19 RX ORDER — AMLODIPINE BESYLATE 10 MG/1
10 TABLET ORAL DAILY
Status: DISCONTINUED | OUTPATIENT
Start: 2025-03-20 | End: 2025-03-25 | Stop reason: HOSPADM

## 2025-03-19 RX ORDER — SODIUM CHLORIDE 0.9 % (FLUSH) 0.9 %
5-40 SYRINGE (ML) INJECTION PRN
Status: DISCONTINUED | OUTPATIENT
Start: 2025-03-19 | End: 2025-03-25 | Stop reason: HOSPADM

## 2025-03-19 RX ORDER — SODIUM CHLORIDE 9 MG/ML
INJECTION, SOLUTION INTRAVENOUS PRN
Status: DISCONTINUED | OUTPATIENT
Start: 2025-03-19 | End: 2025-03-25 | Stop reason: HOSPADM

## 2025-03-19 RX ORDER — FLUTICASONE PROPIONATE 50 MCG
2 SPRAY, SUSPENSION (ML) NASAL DAILY PRN
Status: DISCONTINUED | OUTPATIENT
Start: 2025-03-19 | End: 2025-03-25 | Stop reason: HOSPADM

## 2025-03-19 RX ORDER — LEVOTHYROXINE SODIUM 50 UG/1
50 TABLET ORAL
Status: DISCONTINUED | OUTPATIENT
Start: 2025-03-20 | End: 2025-03-25 | Stop reason: HOSPADM

## 2025-03-19 RX ORDER — DULOXETIN HYDROCHLORIDE 30 MG/1
30 CAPSULE, DELAYED RELEASE ORAL DAILY
Status: DISCONTINUED | OUTPATIENT
Start: 2025-03-19 | End: 2025-03-25 | Stop reason: HOSPADM

## 2025-03-19 RX ORDER — ONDANSETRON 4 MG/1
4 TABLET, ORALLY DISINTEGRATING ORAL EVERY 8 HOURS PRN
Status: DISCONTINUED | OUTPATIENT
Start: 2025-03-19 | End: 2025-03-25 | Stop reason: HOSPADM

## 2025-03-19 RX ORDER — ZOLPIDEM TARTRATE 10 MG/1
10 TABLET ORAL NIGHTLY PRN
Status: DISCONTINUED | OUTPATIENT
Start: 2025-03-19 | End: 2025-03-25 | Stop reason: HOSPADM

## 2025-03-19 RX ORDER — SODIUM CHLORIDE 0.9 % (FLUSH) 0.9 %
5-40 SYRINGE (ML) INJECTION EVERY 12 HOURS SCHEDULED
Status: DISCONTINUED | OUTPATIENT
Start: 2025-03-19 | End: 2025-03-25 | Stop reason: HOSPADM

## 2025-03-19 RX ORDER — ONDANSETRON 2 MG/ML
4 INJECTION INTRAMUSCULAR; INTRAVENOUS EVERY 6 HOURS PRN
Status: DISCONTINUED | OUTPATIENT
Start: 2025-03-19 | End: 2025-03-25 | Stop reason: HOSPADM

## 2025-03-19 RX ORDER — 0.9 % SODIUM CHLORIDE 0.9 %
30 INTRAVENOUS SOLUTION INTRAVENOUS ONCE
Status: COMPLETED | OUTPATIENT
Start: 2025-03-19 | End: 2025-03-19

## 2025-03-19 RX ORDER — POLYETHYLENE GLYCOL 3350 17 G/17G
17 POWDER, FOR SOLUTION ORAL DAILY PRN
Status: DISCONTINUED | OUTPATIENT
Start: 2025-03-19 | End: 2025-03-25 | Stop reason: HOSPADM

## 2025-03-19 RX ORDER — LEVOCETIRIZINE DIHYDROCHLORIDE 5 MG/1
5 TABLET, FILM COATED ORAL NIGHTLY
COMMUNITY

## 2025-03-19 RX ORDER — KETOTIFEN FUMARATE 0.35 MG/ML
1 SOLUTION/ DROPS OPHTHALMIC 2 TIMES DAILY
Status: DISCONTINUED | OUTPATIENT
Start: 2025-03-19 | End: 2025-03-25 | Stop reason: HOSPADM

## 2025-03-19 RX ADMIN — PREGABALIN 150 MG: 100 CAPSULE ORAL at 15:58

## 2025-03-19 RX ADMIN — ACETAMINOPHEN 1000 MG: 500 TABLET, FILM COATED ORAL at 13:25

## 2025-03-19 RX ADMIN — AZITHROMYCIN MONOHYDRATE 500 MG: 500 INJECTION, POWDER, LYOPHILIZED, FOR SOLUTION INTRAVENOUS at 13:39

## 2025-03-19 RX ADMIN — ENOXAPARIN SODIUM 30 MG: 100 INJECTION SUBCUTANEOUS at 16:05

## 2025-03-19 RX ADMIN — DULOXETINE HYDROCHLORIDE 30 MG: 30 CAPSULE, DELAYED RELEASE ORAL at 16:03

## 2025-03-19 RX ADMIN — VITAMIN D, TAB 1000IU (100/BT) 5000 UNITS: 25 TAB at 16:05

## 2025-03-19 RX ADMIN — ENOXAPARIN SODIUM 30 MG: 100 INJECTION SUBCUTANEOUS at 21:01

## 2025-03-19 RX ADMIN — SODIUM CHLORIDE, PRESERVATIVE FREE 10 ML: 5 INJECTION INTRAVENOUS at 21:01

## 2025-03-19 RX ADMIN — CETIRIZINE HYDROCHLORIDE 10 MG: 10 TABLET, FILM COATED ORAL at 21:01

## 2025-03-19 RX ADMIN — SODIUM CHLORIDE: 9 INJECTION, SOLUTION INTRAVENOUS at 16:14

## 2025-03-19 RX ADMIN — PREGABALIN 150 MG: 100 CAPSULE ORAL at 21:01

## 2025-03-19 RX ADMIN — CEFEPIME 2000 MG: 2 INJECTION, POWDER, FOR SOLUTION INTRAVENOUS at 16:18

## 2025-03-19 RX ADMIN — SODIUM CHLORIDE 1848 ML: 9 INJECTION, SOLUTION INTRAVENOUS at 13:43

## 2025-03-19 RX ADMIN — KETOTIFEN FUMARATE 1 DROP: 0.25 SOLUTION/ DROPS OPHTHALMIC at 21:01

## 2025-03-19 RX ADMIN — PREDNISONE 5 MG: 10 TABLET ORAL at 21:01

## 2025-03-19 RX ADMIN — PREDNISONE 5 MG: 10 TABLET ORAL at 16:03

## 2025-03-19 RX ADMIN — WATER 1000 MG: 1 INJECTION INTRAMUSCULAR; INTRAVENOUS; SUBCUTANEOUS at 13:34

## 2025-03-19 ASSESSMENT — LIFESTYLE VARIABLES
HOW MANY STANDARD DRINKS CONTAINING ALCOHOL DO YOU HAVE ON A TYPICAL DAY: PATIENT DOES NOT DRINK
HOW OFTEN DO YOU HAVE A DRINK CONTAINING ALCOHOL: NEVER

## 2025-03-19 ASSESSMENT — PAIN SCALES - GENERAL: PAINLEVEL_OUTOF10: 0

## 2025-03-19 NOTE — ED NOTES
TRANSFER - OUT REPORT:    Verbal report given to POONAM Vizcaino on Daja Armas  being transferred to [facility] for routine progression of patient care       Report consisted of patient's Situation, Background, Assessment and   Recommendations(SBAR).     Information from the following report(s) ED Encounter Summary and ED SBAR was reviewed with the receiving nurse.    [place] Fall Assessment:                           Lines:   Peripheral IV 03/19/25 Proximal;Right Forearm (Active)       Peripheral IV 03/19/25 Right Wrist (Active)        Opportunity for questions and clarification was provided.      Patient transported with:  Deidra Alvarez, RN  03/19/25 3555

## 2025-03-19 NOTE — ED PROVIDER NOTES
Emergency Department Provider Note       PCP: Bernard Ramos Jr., MD   Age: 65 y.o.   Sex: female     DISPOSITION Decision To Admit 03/19/2025 01:49:30 PM    ICD-10-CM    1. Sepsis with encephalopathy without septic shock, due to unspecified organism (HCC)  A41.9     R65.20     G93.41       2. Acute UTI  N39.0           Medical Decision Making     DDX:    Hypoglycemia, electrolyte abnormality, UTI, dehydration, hyperammonemia, acute liver failure, uremia, dementia, sepsis, pneumonia, hyponatremia, hypertensive encephalopathy,    CVA, TIA, brain tumor, subarachnoid hemorrhage, CNS vasculitis, meningitis, encephalitis,    Polysubstance abuse, alcohol withdrawal, acute alcohol intoxication,    Acute psychosis, psychiatric illness, hallucinations, suicidal ideations, homicidal ideations,    Multiple sclerosis, thyrotoxicosis, addisonian crisis,      ED Course as of 03/19/25 1351   Wed Mar 19, 2025   1346 The patient's stepdaughter is in the room at this time.  She said she got a call from her coworkers because the patient did not show up to work today.  Apparently she did call out yesterday.  At that point in time as when she had the police go over and check on her mom.  I explained to her that she needed to be admitted to the hospital for monitoring and she is agreeable this plan [KH]      ED Course User Index  [KH] Heber Mcmillan, DO     1 or more acute illnesses that pose a threat to life or bodily function.   Shared medical decision making was utilized in creating the patients health plan today.  I independently ordered and reviewed each unique test.    I reviewed external records: provider visit note from PCP.  I reviewed external records: provider visit note from outside specialist.  I reviewed external records: previous EKG including cardiologist interpretation.    I reviewed external records: previous lab results from outside ED.  I reviewed external records: previous imaging study including radiologist

## 2025-03-19 NOTE — ED TRIAGE NOTES
Pt arrives from home via ems w/ cc of altered mental status, emesis, urinary incontinence today. 250 NS given en route. , RR 24,  Pt A&O x 2

## 2025-03-19 NOTE — PLAN OF CARE
Problem: Safety - Adult  Goal: Free from fall injury  3/19/2025 1808 by Erlinda Sorenson RN  Outcome: Progressing  3/19/2025 1808 by Erlinda Sorenson RN  Outcome: Progressing     Problem: Chronic Conditions and Co-morbidities  Goal: Patient's chronic conditions and co-morbidity symptoms are monitored and maintained or improved  3/19/2025 1808 by Erlinda Sorenson RN  Outcome: Progressing  3/19/2025 1808 by Erlinda Sorenson RN  Outcome: Progressing     Problem: Pain  Goal: Verbalizes/displays adequate comfort level or baseline comfort level  3/19/2025 1808 by Erlinda Sorenson RN  Outcome: Progressing  3/19/2025 1808 by Erlinda Sorenson RN  Outcome: Progressing     Problem: Skin/Tissue Integrity  Goal: Skin integrity remains intact  Description: 1.  Monitor for areas of redness and/or skin breakdown  2.  Assess vascular access sites hourly  3.  Every 4-6 hours minimum:  Change oxygen saturation probe site  4.  Every 4-6 hours:  If on nasal continuous positive airway pressure, respiratory therapy assess nares and determine need for appliance change or resting period  3/19/2025 1808 by Erlinda Sorenson RN  Outcome: Progressing  3/19/2025 1808 by Erlinda Sorenson RN  Outcome: Progressing     Problem: ABCDS Injury Assessment  Goal: Absence of physical injury  3/19/2025 1808 by Erlinda Sorenson RN  Outcome: Progressing  3/19/2025 1808 by Erlinda Sorenson RN  Outcome: Progressing     Problem: Confusion  Goal: Confusion, delirium, dementia, or psychosis is improved or at baseline  Description: INTERVENTIONS:  1. Assess for possible contributors to thought disturbance, including medications, impaired vision or hearing, underlying metabolic abnormalities, dehydration, psychiatric diagnoses, and notify attending LIP  2. [place] high risk fall precautions, as indicated  3. Provide frequent short contacts to provide reality reorientation, refocusing and direction  4. Decrease environmental stimuli, including noise as appropriate  5. Monitor and intervene

## 2025-03-19 NOTE — H&P
Amphetamine, Urine Negative NEG      Methadone, Urine Negative NEG      THC, TH-Cannabinol, Urine Negative NEG      Opiates, Urine Negative NEG      Barbiturates, Urine Negative NEG     Urinalysis, Micro    Collection Time: 03/19/25 12:41 PM   Result Value Ref Range    WBC, UA >100 0 /hpf    RBC, UA  0 /hpf    Epithelial Cells, UA 0-3 0 /hpf    BACTERIA, URINE 3+ (H) 0 /hpf    Casts GRANULAR 0 /lpf    Mucus, UA 0 0 /lpf    Other observations RESULTS VERIFIED MANUALLY      Amorphous Crystal 1+ (H) 0   Comprehensive Metabolic Panel    Collection Time: 03/19/25 12:42 PM   Result Value Ref Range    Sodium 140 136 - 145 mmol/L    Potassium 3.9 3.5 - 5.1 mmol/L    Chloride 100 98 - 107 mmol/L    CO2 22 20 - 29 mmol/L    Anion Gap 18 (H) 7 - 16 mmol/L    Glucose 156 (H) 70 - 99 mg/dL    BUN 19 8 - 23 MG/DL    Creatinine 1.39 (H) 0.60 - 1.10 MG/DL    Est, Glom Filt Rate 42 (L) >60 ml/min/1.73m2    Calcium 8.6 (L) 8.8 - 10.2 MG/DL    Total Bilirubin 0.4 0.0 - 1.2 MG/DL    ALT 45 8 - 45 U/L     (H) 15 - 37 U/L    Alk Phosphatase 90 35 - 104 U/L    Total Protein 6.6 6.3 - 8.2 g/dL    Albumin 2.9 (L) 3.2 - 4.6 g/dL    Globulin 3.6 (H) 2.3 - 3.5 g/dL    Albumin/Globulin Ratio 0.8 (L) 1.0 - 1.9     CBC with Auto Differential    Collection Time: 03/19/25 12:42 PM   Result Value Ref Range    WBC 12.8 (H) 4.3 - 11.1 K/uL    RBC 4.47 4.05 - 5.2 M/uL    Hemoglobin 13.6 11.7 - 15.4 g/dL    Hematocrit 40.3 35.8 - 46.3 %    MCV 90.2 82 - 102 FL    MCH 30.4 26.1 - 32.9 PG    MCHC 33.7 31.4 - 35.0 g/dL    RDW 15.3 (H) 11.9 - 14.6 %    Platelets 196 150 - 450 K/uL    MPV 11.6 9.4 - 12.3 FL    nRBC 0.00 0.0 - 0.2 K/uL    Neutrophils % 79.2 (H) 43.0 - 78.0 %    Lymphocytes % 9.3 (L) 13.0 - 44.0 %    Monocytes % 10.7 4.0 - 12.0 %    Eosinophils % 0.0 (L) 0.5 - 7.8 %    Basophils % 0.2 0.0 - 2.0 %    Immature Granulocytes % 0.6 0.0 - 5.0 %    Neutrophils Absolute 10.14 (H) 1.70 - 8.20 K/UL    Lymphocytes Absolute 1.19 0.50 - 4.60

## 2025-03-20 LAB
ACB COMPLEX DNA BLD POS QL NAA+NON-PROBE: NOT DETECTED
ACCESSION NUMBER, LLC1M: ABNORMAL
ANION GAP SERPL CALC-SCNC: 11 MMOL/L (ref 7–16)
B FRAGILIS DNA BLD POS QL NAA+NON-PROBE: NOT DETECTED
BASOPHILS # BLD: 0.03 K/UL (ref 0–0.2)
BASOPHILS NFR BLD: 0.3 % (ref 0–2)
BIOFIRE TEST COMMENT: ABNORMAL
BUN SERPL-MCNC: 12 MG/DL (ref 8–23)
C ALBICANS DNA BLD POS QL NAA+NON-PROBE: NOT DETECTED
C AURIS DNA BLD POS QL NAA+NON-PROBE: NOT DETECTED
C GATTII+NEOFOR DNA BLD POS QL NAA+N-PRB: NOT DETECTED
C GLABRATA DNA BLD POS QL NAA+NON-PROBE: NOT DETECTED
C KRUSEI DNA BLD POS QL NAA+NON-PROBE: NOT DETECTED
C PARAP DNA BLD POS QL NAA+NON-PROBE: NOT DETECTED
C TROPICLS DNA BLD POS QL NAA+NON-PROBE: NOT DETECTED
CALCIUM SERPL-MCNC: 8.3 MG/DL (ref 8.8–10.2)
CHLORIDE SERPL-SCNC: 111 MMOL/L (ref 98–107)
CO2 SERPL-SCNC: 21 MMOL/L (ref 20–29)
CREAT SERPL-MCNC: 0.56 MG/DL (ref 0.6–1.1)
DIFFERENTIAL METHOD BLD: ABNORMAL
E CLOAC COMP DNA BLD POS NAA+NON-PROBE: NOT DETECTED
E COLI DNA BLD POS QL NAA+NON-PROBE: NOT DETECTED
E FAECALIS DNA BLD POS QL NAA+NON-PROBE: NOT DETECTED
E FAECIUM DNA BLD POS QL NAA+NON-PROBE: NOT DETECTED
ENTEROBACTERALES DNA BLD POS NAA+N-PRB: NOT DETECTED
EOSINOPHIL # BLD: 0 K/UL (ref 0–0.8)
EOSINOPHIL NFR BLD: 0 % (ref 0.5–7.8)
ERYTHROCYTE [DISTWIDTH] IN BLOOD BY AUTOMATED COUNT: 15.2 % (ref 11.9–14.6)
GLUCOSE BLD STRIP.AUTO-MCNC: 133 MG/DL (ref 65–100)
GLUCOSE BLD STRIP.AUTO-MCNC: 142 MG/DL (ref 65–100)
GLUCOSE BLD STRIP.AUTO-MCNC: 146 MG/DL (ref 65–100)
GLUCOSE BLD STRIP.AUTO-MCNC: 168 MG/DL (ref 65–100)
GLUCOSE SERPL-MCNC: 162 MG/DL (ref 70–99)
GP B STREP DNA BLD POS QL NAA+NON-PROBE: NOT DETECTED
HAEM INFLU DNA BLD POS QL NAA+NON-PROBE: NOT DETECTED
HCT VFR BLD AUTO: 37.7 % (ref 35.8–46.3)
HGB BLD-MCNC: 12.3 G/DL (ref 11.7–15.4)
IMM GRANULOCYTES # BLD AUTO: 0.03 K/UL (ref 0–0.5)
IMM GRANULOCYTES NFR BLD AUTO: 0.3 % (ref 0–5)
K OXYTOCA DNA BLD POS QL NAA+NON-PROBE: NOT DETECTED
KLEBSIELLA SP DNA BLD POS QL NAA+NON-PRB: NOT DETECTED
KLEBSIELLA SP DNA BLD POS QL NAA+NON-PRB: NOT DETECTED
L MONOCYTOG DNA BLD POS QL NAA+NON-PROBE: NOT DETECTED
LYMPHOCYTES # BLD: 1.2 K/UL (ref 0.5–4.6)
LYMPHOCYTES NFR BLD: 13.5 % (ref 13–44)
MAGNESIUM SERPL-MCNC: 1.9 MG/DL (ref 1.8–2.4)
MCH RBC QN AUTO: 30.6 PG (ref 26.1–32.9)
MCHC RBC AUTO-ENTMCNC: 32.6 G/DL (ref 31.4–35)
MCV RBC AUTO: 93.8 FL (ref 82–102)
MECA+MECC+MREJ ISLT/SPM QL: NOT DETECTED
MONOCYTES # BLD: 0.69 K/UL (ref 0.1–1.3)
MONOCYTES NFR BLD: 7.8 % (ref 4–12)
N MEN DNA BLD POS QL NAA+NON-PROBE: NOT DETECTED
NEUTS SEG # BLD: 6.94 K/UL (ref 1.7–8.2)
NEUTS SEG NFR BLD: 78.1 % (ref 43–78)
NRBC # BLD: 0 K/UL (ref 0–0.2)
P AERUGINOSA DNA BLD POS NAA+NON-PROBE: NOT DETECTED
PLATELET # BLD AUTO: 187 K/UL (ref 150–450)
PMV BLD AUTO: 11.1 FL (ref 9.4–12.3)
POTASSIUM SERPL-SCNC: 3.4 MMOL/L (ref 3.5–5.1)
PROTEUS SP DNA BLD POS QL NAA+NON-PROBE: NOT DETECTED
RBC # BLD AUTO: 4.02 M/UL (ref 4.05–5.2)
RESISTANT GENE TARGETS: ABNORMAL
S AUREUS DNA BLD POS QL NAA+NON-PROBE: DETECTED
S AUREUS+CONS DNA BLD POS NAA+NON-PROBE: DETECTED
S EPIDERMIDIS DNA BLD POS QL NAA+NON-PRB: NOT DETECTED
S LUGDUNENSIS DNA BLD POS QL NAA+NON-PRB: NOT DETECTED
S MALTOPHILIA DNA BLD POS QL NAA+NON-PRB: NOT DETECTED
S MARCESCENS DNA BLD POS NAA+NON-PROBE: NOT DETECTED
S PNEUM DNA BLD POS QL NAA+NON-PROBE: NOT DETECTED
S PYO DNA BLD POS QL NAA+NON-PROBE: NOT DETECTED
SALMONELLA DNA BLD POS QL NAA+NON-PROBE: NOT DETECTED
SERVICE CMNT-IMP: ABNORMAL
SODIUM SERPL-SCNC: 143 MMOL/L (ref 136–145)
STREPTOCOCCUS DNA BLD POS NAA+NON-PROBE: NOT DETECTED
WBC # BLD AUTO: 8.9 K/UL (ref 4.3–11.1)

## 2025-03-20 PROCEDURE — 83735 ASSAY OF MAGNESIUM: CPT

## 2025-03-20 PROCEDURE — 2580000003 HC RX 258: Performed by: FAMILY MEDICINE

## 2025-03-20 PROCEDURE — 85025 COMPLETE CBC W/AUTO DIFF WBC: CPT

## 2025-03-20 PROCEDURE — 2500000003 HC RX 250 WO HCPCS: Performed by: NURSE PRACTITIONER

## 2025-03-20 PROCEDURE — 97530 THERAPEUTIC ACTIVITIES: CPT

## 2025-03-20 PROCEDURE — 36415 COLL VENOUS BLD VENIPUNCTURE: CPT

## 2025-03-20 PROCEDURE — 5A0935A ASSISTANCE WITH RESPIRATORY VENTILATION, LESS THAN 24 CONSECUTIVE HOURS, HIGH NASAL FLOW/VELOCITY: ICD-10-PCS | Performed by: INTERNAL MEDICINE

## 2025-03-20 PROCEDURE — 6360000002 HC RX W HCPCS: Performed by: FAMILY MEDICINE

## 2025-03-20 PROCEDURE — 6370000000 HC RX 637 (ALT 250 FOR IP): Performed by: NURSE PRACTITIONER

## 2025-03-20 PROCEDURE — 1100000003 HC PRIVATE W/ TELEMETRY

## 2025-03-20 PROCEDURE — 82962 GLUCOSE BLOOD TEST: CPT

## 2025-03-20 PROCEDURE — 80048 BASIC METABOLIC PNL TOTAL CA: CPT

## 2025-03-20 PROCEDURE — 6360000002 HC RX W HCPCS: Performed by: NURSE PRACTITIONER

## 2025-03-20 PROCEDURE — 2500000003 HC RX 250 WO HCPCS: Performed by: FAMILY MEDICINE

## 2025-03-20 PROCEDURE — 6370000000 HC RX 637 (ALT 250 FOR IP): Performed by: FAMILY MEDICINE

## 2025-03-20 PROCEDURE — 97535 SELF CARE MNGMENT TRAINING: CPT

## 2025-03-20 PROCEDURE — 97161 PT EVAL LOW COMPLEX 20 MIN: CPT

## 2025-03-20 PROCEDURE — 97165 OT EVAL LOW COMPLEX 30 MIN: CPT

## 2025-03-20 RX ORDER — IBUPROFEN 400 MG/1
600 TABLET, FILM COATED ORAL ONCE
Status: COMPLETED | OUTPATIENT
Start: 2025-03-20 | End: 2025-03-20

## 2025-03-20 RX ORDER — POTASSIUM CHLORIDE 1500 MG/1
40 TABLET, EXTENDED RELEASE ORAL ONCE
Status: COMPLETED | OUTPATIENT
Start: 2025-03-20 | End: 2025-03-20

## 2025-03-20 RX ORDER — IBUPROFEN 400 MG/1
600 TABLET, FILM COATED ORAL 2 TIMES DAILY
Status: DISCONTINUED | OUTPATIENT
Start: 2025-03-20 | End: 2025-03-20

## 2025-03-20 RX ADMIN — DULOXETINE HYDROCHLORIDE 30 MG: 30 CAPSULE, DELAYED RELEASE ORAL at 08:29

## 2025-03-20 RX ADMIN — IBUPROFEN 600 MG: 400 TABLET, FILM COATED ORAL at 04:54

## 2025-03-20 RX ADMIN — FLUTICASONE PROPIONATE 2 SPRAY: 50 SPRAY, METERED NASAL at 20:20

## 2025-03-20 RX ADMIN — ONDANSETRON 4 MG: 4 TABLET, ORALLY DISINTEGRATING ORAL at 21:30

## 2025-03-20 RX ADMIN — LEVOTHYROXINE SODIUM 50 MCG: 0.05 TABLET ORAL at 08:28

## 2025-03-20 RX ADMIN — CEFEPIME 2000 MG: 2 INJECTION, POWDER, FOR SOLUTION INTRAVENOUS at 11:52

## 2025-03-20 RX ADMIN — POTASSIUM CHLORIDE 40 MEQ: 1500 TABLET, EXTENDED RELEASE ORAL at 08:29

## 2025-03-20 RX ADMIN — CETIRIZINE HYDROCHLORIDE 10 MG: 10 TABLET, FILM COATED ORAL at 20:20

## 2025-03-20 RX ADMIN — VITAMIN D, TAB 1000IU (100/BT) 5000 UNITS: 25 TAB at 08:28

## 2025-03-20 RX ADMIN — SODIUM CHLORIDE: 9 INJECTION, SOLUTION INTRAVENOUS at 10:18

## 2025-03-20 RX ADMIN — ENOXAPARIN SODIUM 30 MG: 100 INJECTION SUBCUTANEOUS at 08:34

## 2025-03-20 RX ADMIN — ZOLPIDEM TARTRATE 10 MG: 5 TABLET ORAL at 20:20

## 2025-03-20 RX ADMIN — PREGABALIN 150 MG: 100 CAPSULE ORAL at 08:28

## 2025-03-20 RX ADMIN — ENOXAPARIN SODIUM 30 MG: 100 INJECTION SUBCUTANEOUS at 20:19

## 2025-03-20 RX ADMIN — PREDNISONE 5 MG: 10 TABLET ORAL at 20:22

## 2025-03-20 RX ADMIN — CEFAZOLIN 2000 MG: 10 INJECTION, POWDER, FOR SOLUTION INTRAVENOUS at 15:44

## 2025-03-20 RX ADMIN — KETOTIFEN FUMARATE 1 DROP: 0.25 SOLUTION/ DROPS OPHTHALMIC at 20:21

## 2025-03-20 RX ADMIN — PREGABALIN 150 MG: 100 CAPSULE ORAL at 20:20

## 2025-03-20 RX ADMIN — SODIUM CHLORIDE: 9 INJECTION, SOLUTION INTRAVENOUS at 18:13

## 2025-03-20 RX ADMIN — PREDNISONE 5 MG: 10 TABLET ORAL at 08:29

## 2025-03-20 RX ADMIN — SODIUM CHLORIDE: 9 INJECTION, SOLUTION INTRAVENOUS at 00:50

## 2025-03-20 RX ADMIN — KETOTIFEN FUMARATE 1 DROP: 0.25 SOLUTION/ DROPS OPHTHALMIC at 08:35

## 2025-03-20 RX ADMIN — SODIUM CHLORIDE, PRESERVATIVE FREE 10 ML: 5 INJECTION INTRAVENOUS at 20:20

## 2025-03-20 ASSESSMENT — PAIN DESCRIPTION - DESCRIPTORS
DESCRIPTORS: ACHING
DESCRIPTORS: ACHING

## 2025-03-20 ASSESSMENT — PAIN DESCRIPTION - LOCATION: LOCATION: GENERALIZED

## 2025-03-20 ASSESSMENT — PAIN SCALES - GENERAL
PAINLEVEL_OUTOF10: 9
PAINLEVEL_OUTOF10: 5

## 2025-03-20 ASSESSMENT — PAIN - FUNCTIONAL ASSESSMENT: PAIN_FUNCTIONAL_ASSESSMENT: PREVENTS OR INTERFERES SOME ACTIVE ACTIVITIES AND ADLS

## 2025-03-20 NOTE — PLAN OF CARE
Patient has remained A&O x 3-4. few episodes of forgetfulness/mild confusion. Patient educated on new medication Ancef.   -NP made aware of positive blood cultures and antibiotics changed.   --amlodipine held for soft BP this AM.  -Patient denies pain, N/V/D.   -sat up in chair after working with therapy.  Patient rounded on hourly by myself or patient assistant and encouraged to call with any needs. No signs of distress noted. Bed low, locked, call bell within reach.   BSSR given to ***, RN  Erlinda Sorenson RN   Problem: Safety - Adult  Goal: Free from fall injury  Outcome: Progressing     Problem: Chronic Conditions and Co-morbidities  Goal: Patient's chronic conditions and co-morbidity symptoms are monitored and maintained or improved  Outcome: Progressing     Problem: Pain  Goal: Verbalizes/displays adequate comfort level or baseline comfort level  Outcome: Progressing     Problem: Skin/Tissue Integrity  Goal: Skin integrity remains intact  Description: 1.  Monitor for areas of redness and/or skin breakdown  2.  Assess vascular access sites hourly  3.  Every 4-6 hours minimum:  Change oxygen saturation probe site  4.  Every 4-6 hours:  If on nasal continuous positive airway pressure, respiratory therapy assess nares and determine need for appliance change or resting period  Outcome: Progressing     Problem: ABCDS Injury Assessment  Goal: Absence of physical injury  Outcome: Progressing     Problem: Confusion  Goal: Confusion, delirium, dementia, or psychosis is improved or at baseline  Description: INTERVENTIONS:  1. Assess for possible contributors to thought disturbance, including medications, impaired vision or hearing, underlying metabolic abnormalities, dehydration, psychiatric diagnoses, and notify attending LIP  2. [place] high risk fall precautions, as indicated  3. Provide frequent short contacts to provide reality reorientation, refocusing and direction  4. Decrease environmental stimuli, including

## 2025-03-20 NOTE — THERAPY EVALUATION
ACUTE PHYSICAL THERAPY GOALS:   (Developed with and agreed upon by patient and/or caregiver.)  Daja Armas  will demonstrate sup<>sit transfer with I using bedrails in 7 therapy sessions to improve bed mobility.  Daja Armas will demonstrate STS transfer with MI using BUE support and LRAD in 7 therapy sessions to improve transfers.  Daja Armas will ambulate with MI and LRAD x 150 ft in 7 therapy sessions to improve functional mobility.   Daja Armas will tolerate 15 min of TA/TE in 7 therapy sessions to improve strength and endurance.  Daja Armas will improve AMPAC score to 22 / 24 in 7 therapy sessions to demonstrate reduced fall risk.      PHYSICAL THERAPY Initial Assessment, Daily Note, and AM  (Link to Caseload Tracking: PT Visit Days : 1  Acknowledge Orders  Time In/Out  PT Charge Capture  Rehab Caseload Tracker    Daja Armas is a 65 y.o. female   PRIMARY DIAGNOSIS: Sepsis (HCC)  Acute UTI [N39.0]  Sepsis (HCC) [A41.9]  Sepsis with encephalopathy without septic shock, due to unspecified organism (HCC) [A41.9, R65.20, G93.41]       Reason for Referral: Generalized Muscle Weakness (M62.81)  Difficulty in walking, Not elsewhere classified (R26.2)  Other abnormalities of gait and mobility (R26.89)  Inpatient: Payor: JONEL / Plan: [employer] / Product Type: *No Product type* /     ASSESSMENT:     REHAB RECOMMENDATIONS:   Recommendation to date pending progress:  Setting:  Home Health Therapy    Equipment:    None     ASSESSMENT:  Ms. Armas is found supine in bed upon PT arrival in no apparent distress, agreeable to eval with IV and external catheter intact. Patient is a 65 y.o. year old female admitted on 3/19/2025 for sepsis with PMH of RA, DM II, HTN, HLD, GERD. she has c/o pain in back, ribs, and multiple joints d/t RA rated 5/10. Patient requires Jed for sup>sit with HOB elevated, cues for hand placement and sequencing, and CGA for STS using RW cues for hand placement and wt shifting. Next,

## 2025-03-20 NOTE — CARE COORDINATION
03/20/25 1526   Service Assessment   Patient Orientation Alert and Oriented   Cognition Alert   History Provided By Patient   Primary Caregiver Self   Support Systems Family Members;Children   Patient's Healthcare Decision Maker is: Legal Next of Kin   PCP Verified by CM Yes   Last Visit to PCP Within last year   Prior Functional Level Independent in ADLs/IADLs   Current Functional Level Independent in ADLs/IADLs   Can patient return to prior living arrangement Yes   Ability to make needs known: Good   Family able to assist with home care needs: Yes   Would you like for me to discuss the discharge plan with any other family members/significant others, and if so, who? Yes   Financial Resources Other (Comment)  (commercial, [employer])   Social/Functional History   Lives With Alone   Receives Help From Family   Prior Level of Assist for ADLs Independent   Prior Level of Assist for Homemaking Independent   Ambulation Assistance Independent   Prior Level of Assist for Transfers Independent   Active  Yes   Mode of Transportation Car   Services At/After Discharge   Confirm Follow Up Transport Family     CM met with pt at bedside, demographics and PCP verified. Pt lives alone with adult children close by, indpt prior to admit, drives, no hx of h/h, DME-rollator. Pt daughter will transport home, no needs identified at this time. CM will continue to follow.

## 2025-03-21 ENCOUNTER — APPOINTMENT (OUTPATIENT)
Dept: CT IMAGING | Age: 65
DRG: 871 | End: 2025-03-21
Payer: COMMERCIAL

## 2025-03-21 ENCOUNTER — APPOINTMENT (OUTPATIENT)
Dept: MRI IMAGING | Age: 65
DRG: 871 | End: 2025-03-21
Payer: COMMERCIAL

## 2025-03-21 ENCOUNTER — APPOINTMENT (OUTPATIENT)
Dept: GENERAL RADIOLOGY | Age: 65
DRG: 871 | End: 2025-03-21
Payer: COMMERCIAL

## 2025-03-21 LAB
ANION GAP SERPL CALC-SCNC: 13 MMOL/L (ref 7–16)
BACTERIA SPEC CULT: ABNORMAL
BASOPHILS # BLD: 0.02 K/UL (ref 0–0.2)
BASOPHILS NFR BLD: 0.2 % (ref 0–2)
BUN SERPL-MCNC: 12 MG/DL (ref 8–23)
CALCIUM SERPL-MCNC: 8.9 MG/DL (ref 8.8–10.2)
CHLORIDE SERPL-SCNC: 110 MMOL/L (ref 98–107)
CO2 SERPL-SCNC: 20 MMOL/L (ref 20–29)
CREAT SERPL-MCNC: 0.55 MG/DL (ref 0.6–1.1)
DIFFERENTIAL METHOD BLD: ABNORMAL
EOSINOPHIL # BLD: 0.01 K/UL (ref 0–0.8)
EOSINOPHIL NFR BLD: 0.1 % (ref 0.5–7.8)
ERYTHROCYTE [DISTWIDTH] IN BLOOD BY AUTOMATED COUNT: 15.6 % (ref 11.9–14.6)
GLUCOSE BLD STRIP.AUTO-MCNC: 113 MG/DL (ref 65–100)
GLUCOSE BLD STRIP.AUTO-MCNC: 154 MG/DL (ref 65–100)
GLUCOSE BLD STRIP.AUTO-MCNC: 97 MG/DL (ref 65–100)
GLUCOSE SERPL-MCNC: 140 MG/DL (ref 70–99)
HCT VFR BLD AUTO: 43.6 % (ref 35.8–46.3)
HGB BLD-MCNC: 13.7 G/DL (ref 11.7–15.4)
IMM GRANULOCYTES # BLD AUTO: 0.03 K/UL (ref 0–0.5)
IMM GRANULOCYTES NFR BLD AUTO: 0.3 % (ref 0–5)
LYMPHOCYTES # BLD: 1.5 K/UL (ref 0.5–4.6)
LYMPHOCYTES NFR BLD: 15.8 % (ref 13–44)
MCH RBC QN AUTO: 30 PG (ref 26.1–32.9)
MCHC RBC AUTO-ENTMCNC: 31.4 G/DL (ref 31.4–35)
MCV RBC AUTO: 95.4 FL (ref 82–102)
MONOCYTES # BLD: 0.97 K/UL (ref 0.1–1.3)
MONOCYTES NFR BLD: 10.2 % (ref 4–12)
NEUTS SEG # BLD: 6.99 K/UL (ref 1.7–8.2)
NEUTS SEG NFR BLD: 73.4 % (ref 43–78)
NRBC # BLD: 0 K/UL (ref 0–0.2)
PLATELET # BLD AUTO: 180 K/UL (ref 150–450)
PMV BLD AUTO: 11.3 FL (ref 9.4–12.3)
POTASSIUM SERPL-SCNC: 4.2 MMOL/L (ref 3.5–5.1)
RBC # BLD AUTO: 4.57 M/UL (ref 4.05–5.2)
SERVICE CMNT-IMP: ABNORMAL
SERVICE CMNT-IMP: NORMAL
SODIUM SERPL-SCNC: 144 MMOL/L (ref 136–145)
WBC # BLD AUTO: 9.5 K/UL (ref 4.3–11.1)

## 2025-03-21 PROCEDURE — 85025 COMPLETE CBC W/AUTO DIFF WBC: CPT

## 2025-03-21 PROCEDURE — 2500000003 HC RX 250 WO HCPCS: Performed by: FAMILY MEDICINE

## 2025-03-21 PROCEDURE — A9579 GAD-BASE MR CONTRAST NOS,1ML: HCPCS | Performed by: NURSE PRACTITIONER

## 2025-03-21 PROCEDURE — 2580000003 HC RX 258: Performed by: FAMILY MEDICINE

## 2025-03-21 PROCEDURE — 71045 X-RAY EXAM CHEST 1 VIEW: CPT

## 2025-03-21 PROCEDURE — 2500000003 HC RX 250 WO HCPCS: Performed by: NURSE PRACTITIONER

## 2025-03-21 PROCEDURE — 71250 CT THORAX DX C-: CPT

## 2025-03-21 PROCEDURE — 6360000004 HC RX CONTRAST MEDICATION: Performed by: NURSE PRACTITIONER

## 2025-03-21 PROCEDURE — 6370000000 HC RX 637 (ALT 250 FOR IP): Performed by: FAMILY MEDICINE

## 2025-03-21 PROCEDURE — 82962 GLUCOSE BLOOD TEST: CPT

## 2025-03-21 PROCEDURE — 6360000002 HC RX W HCPCS: Performed by: FAMILY MEDICINE

## 2025-03-21 PROCEDURE — 80048 BASIC METABOLIC PNL TOTAL CA: CPT

## 2025-03-21 PROCEDURE — 36415 COLL VENOUS BLD VENIPUNCTURE: CPT

## 2025-03-21 PROCEDURE — 6360000002 HC RX W HCPCS: Performed by: NURSE PRACTITIONER

## 2025-03-21 PROCEDURE — 6370000000 HC RX 637 (ALT 250 FOR IP): Performed by: NURSE PRACTITIONER

## 2025-03-21 PROCEDURE — 87040 BLOOD CULTURE FOR BACTERIA: CPT

## 2025-03-21 PROCEDURE — 72158 MRI LUMBAR SPINE W/O & W/DYE: CPT

## 2025-03-21 PROCEDURE — 1100000000 HC RM PRIVATE

## 2025-03-21 RX ADMIN — PREGABALIN 150 MG: 100 CAPSULE ORAL at 20:24

## 2025-03-21 RX ADMIN — AMLODIPINE BESYLATE 10 MG: 10 TABLET ORAL at 08:05

## 2025-03-21 RX ADMIN — CETIRIZINE HYDROCHLORIDE 10 MG: 10 TABLET, FILM COATED ORAL at 20:24

## 2025-03-21 RX ADMIN — CEFAZOLIN 2000 MG: 10 INJECTION, POWDER, FOR SOLUTION INTRAVENOUS at 00:29

## 2025-03-21 RX ADMIN — ONDANSETRON 4 MG: 4 TABLET, ORALLY DISINTEGRATING ORAL at 08:22

## 2025-03-21 RX ADMIN — PREDNISONE 5 MG: 10 TABLET ORAL at 08:06

## 2025-03-21 RX ADMIN — ENOXAPARIN SODIUM 30 MG: 100 INJECTION SUBCUTANEOUS at 08:06

## 2025-03-21 RX ADMIN — SODIUM CHLORIDE, PRESERVATIVE FREE 10 ML: 5 INJECTION INTRAVENOUS at 20:24

## 2025-03-21 RX ADMIN — SODIUM CHLORIDE: 9 INJECTION, SOLUTION INTRAVENOUS at 01:57

## 2025-03-21 RX ADMIN — KETOTIFEN FUMARATE 1 DROP: 0.25 SOLUTION/ DROPS OPHTHALMIC at 08:07

## 2025-03-21 RX ADMIN — ENOXAPARIN SODIUM 30 MG: 100 INJECTION SUBCUTANEOUS at 20:23

## 2025-03-21 RX ADMIN — SODIUM CHLORIDE, PRESERVATIVE FREE 10 ML: 5 INJECTION INTRAVENOUS at 08:06

## 2025-03-21 RX ADMIN — ZOLPIDEM TARTRATE 10 MG: 5 TABLET ORAL at 20:34

## 2025-03-21 RX ADMIN — KETOTIFEN FUMARATE 1 DROP: 0.25 SOLUTION/ DROPS OPHTHALMIC at 20:24

## 2025-03-21 RX ADMIN — LEVOTHYROXINE SODIUM 50 MCG: 0.05 TABLET ORAL at 08:05

## 2025-03-21 RX ADMIN — CEFAZOLIN 2000 MG: 10 INJECTION, POWDER, FOR SOLUTION INTRAVENOUS at 17:11

## 2025-03-21 RX ADMIN — DULOXETINE HYDROCHLORIDE 30 MG: 30 CAPSULE, DELAYED RELEASE ORAL at 08:05

## 2025-03-21 RX ADMIN — VITAMIN D, TAB 1000IU (100/BT) 5000 UNITS: 25 TAB at 08:05

## 2025-03-21 RX ADMIN — GADOTERIDOL 24 ML: 279.3 INJECTION, SOLUTION INTRAVENOUS at 19:34

## 2025-03-21 RX ADMIN — PREGABALIN 150 MG: 100 CAPSULE ORAL at 08:05

## 2025-03-21 RX ADMIN — PREDNISONE 5 MG: 10 TABLET ORAL at 20:24

## 2025-03-21 RX ADMIN — CEFAZOLIN 2000 MG: 10 INJECTION, POWDER, FOR SOLUTION INTRAVENOUS at 08:15

## 2025-03-21 RX ADMIN — ACETAMINOPHEN 650 MG: 325 TABLET ORAL at 10:58

## 2025-03-22 ENCOUNTER — APPOINTMENT (OUTPATIENT)
Dept: NON INVASIVE DIAGNOSTICS | Age: 65
DRG: 871 | End: 2025-03-22
Payer: COMMERCIAL

## 2025-03-22 LAB
ANION GAP SERPL CALC-SCNC: 10 MMOL/L (ref 7–16)
BACTERIA SPEC CULT: ABNORMAL
BASOPHILS # BLD: 0.03 K/UL (ref 0–0.2)
BASOPHILS NFR BLD: 0.4 % (ref 0–2)
BUN SERPL-MCNC: 9 MG/DL (ref 8–23)
CALCIUM SERPL-MCNC: 8.9 MG/DL (ref 8.8–10.2)
CHLORIDE SERPL-SCNC: 110 MMOL/L (ref 98–107)
CO2 SERPL-SCNC: 26 MMOL/L (ref 20–29)
CREAT SERPL-MCNC: 0.5 MG/DL (ref 0.6–1.1)
DIFFERENTIAL METHOD BLD: ABNORMAL
ECHO AO ASC DIAM: 3.1 CM
ECHO AO ASCENDING AORTA INDEX: 1.34 CM/M2
ECHO AO ROOT DIAM: 3.1 CM
ECHO AO ROOT INDEX: 1.34 CM/M2
ECHO AV AREA PEAK VELOCITY: 2.1 CM2
ECHO AV AREA VTI: 2.2 CM2
ECHO AV AREA/BSA PEAK VELOCITY: 0.9 CM2/M2
ECHO AV AREA/BSA VTI: 0.9 CM2/M2
ECHO AV MEAN GRADIENT: 6 MMHG
ECHO AV MEAN VELOCITY: 1.1 M/S
ECHO AV PEAK GRADIENT: 12 MMHG
ECHO AV PEAK GRADIENT: 12 MMHG
ECHO AV PEAK VELOCITY: 1.7 M/S
ECHO AV VELOCITY RATIO: 0.65
ECHO AV VTI: 30.6 CM
ECHO BSA: 2.34 M2
ECHO EST RA PRESSURE: 3 MMHG
ECHO IVC PROX: 1.4 CM
ECHO LA AREA 2C: 23.3 CM2
ECHO LA AREA 4C: 22.8 CM2
ECHO LA DIAMETER INDEX: 1.72 CM/M2
ECHO LA DIAMETER: 4 CM
ECHO LA MAJOR AXIS: 6.1 CM
ECHO LA MINOR AXIS: 5.9 CM
ECHO LA TO AORTIC ROOT RATIO: 1.29
ECHO LA VOL BP: 72 ML (ref 22–52)
ECHO LA VOL MOD A2C: 74 ML (ref 22–52)
ECHO LA VOL MOD A4C: 67 ML (ref 22–52)
ECHO LA VOL/BSA BIPLANE: 31 ML/M2 (ref 16–34)
ECHO LA VOLUME INDEX MOD A2C: 32 ML/M2 (ref 16–34)
ECHO LA VOLUME INDEX MOD A4C: 29 ML/M2 (ref 16–34)
ECHO LV E' LATERAL VELOCITY: 13.8 CM/S
ECHO LV E' SEPTAL VELOCITY: 9.25 CM/S
ECHO LV EDV A2C: 121 ML
ECHO LV EDV A4C: 137 ML
ECHO LV EDV INDEX A4C: 59 ML/M2
ECHO LV EDV NDEX A2C: 52 ML/M2
ECHO LV EF PHYSICIAN: 62 %
ECHO LV EJECTION FRACTION A2C: 61 %
ECHO LV EJECTION FRACTION A4C: 65 %
ECHO LV EJECTION FRACTION BIPLANE: 62 % (ref 55–100)
ECHO LV ESV A2C: 47 ML
ECHO LV ESV A4C: 49 ML
ECHO LV ESV INDEX A2C: 20 ML/M2
ECHO LV ESV INDEX A4C: 21 ML/M2
ECHO LV FRACTIONAL SHORTENING: 34 % (ref 28–44)
ECHO LV INTERNAL DIMENSION DIASTOLE INDEX: 2.03 CM/M2
ECHO LV INTERNAL DIMENSION DIASTOLIC: 4.7 CM (ref 3.9–5.3)
ECHO LV INTERNAL DIMENSION SYSTOLIC INDEX: 1.34 CM/M2
ECHO LV INTERNAL DIMENSION SYSTOLIC: 3.1 CM
ECHO LV IVSD: 1.1 CM (ref 0.6–0.9)
ECHO LV MASS 2D: 175.8 G (ref 67–162)
ECHO LV MASS INDEX 2D: 75.8 G/M2 (ref 43–95)
ECHO LV POSTERIOR WALL DIASTOLIC: 1 CM (ref 0.6–0.9)
ECHO LV RELATIVE WALL THICKNESS RATIO: 0.43
ECHO LVOT AREA: 3.1 CM2
ECHO LVOT AV VTI INDEX: 0.7
ECHO LVOT DIAM: 2 CM
ECHO LVOT MEAN GRADIENT: 3 MMHG
ECHO LVOT PEAK GRADIENT: 5 MMHG
ECHO LVOT PEAK VELOCITY: 1.1 M/S
ECHO LVOT STROKE VOLUME INDEX: 29.1 ML/M2
ECHO LVOT SV: 67.5 ML
ECHO LVOT VTI: 21.5 CM
ECHO MV A VELOCITY: 0.8 M/S
ECHO MV AREA VTI: 2.4 CM2
ECHO MV E DECELERATION TIME (DT): 201 MS
ECHO MV E VELOCITY: 1.05 M/S
ECHO MV E/A RATIO: 1.31
ECHO MV E/E' LATERAL: 7.61
ECHO MV E/E' RATIO (AVERAGED): 9.48
ECHO MV E/E' SEPTAL: 11.35
ECHO MV LVOT VTI INDEX: 1.32
ECHO MV MAX VELOCITY: 1.2 M/S
ECHO MV MEAN GRADIENT: 2 MMHG
ECHO MV MEAN VELOCITY: 0.7 M/S
ECHO MV PEAK GRADIENT: 6 MMHG
ECHO MV VTI: 28.3 CM
ECHO PV ACCELERATION TIME (AT): 55 MS
ECHO PV MAX VELOCITY: 1.3 M/S
ECHO PV PEAK GRADIENT: 7 MMHG
ECHO RIGHT VENTRICULAR SYSTOLIC PRESSURE (RVSP): 30 MMHG
ECHO RV BASAL DIMENSION: 3.5 CM
ECHO RV FREE WALL PEAK S': 11.4 CM/S
ECHO RV TAPSE: 2.1 CM (ref 1.7–?)
ECHO TV REGURGITANT MAX VELOCITY: 2.59 M/S
ECHO TV REGURGITANT PEAK GRADIENT: 27 MMHG
EOSINOPHIL # BLD: 0.02 K/UL (ref 0–0.8)
EOSINOPHIL NFR BLD: 0.3 % (ref 0.5–7.8)
ERYTHROCYTE [DISTWIDTH] IN BLOOD BY AUTOMATED COUNT: 15.7 % (ref 11.9–14.6)
GLUCOSE BLD STRIP.AUTO-MCNC: 121 MG/DL (ref 65–100)
GLUCOSE BLD STRIP.AUTO-MCNC: 131 MG/DL (ref 65–100)
GLUCOSE BLD STRIP.AUTO-MCNC: 161 MG/DL (ref 65–100)
GLUCOSE SERPL-MCNC: 123 MG/DL (ref 70–99)
GRAM STN SPEC: ABNORMAL
HCT VFR BLD AUTO: 39.4 % (ref 35.8–46.3)
HGB BLD-MCNC: 13.1 G/DL (ref 11.7–15.4)
IMM GRANULOCYTES # BLD AUTO: 0.06 K/UL (ref 0–0.5)
IMM GRANULOCYTES NFR BLD AUTO: 0.8 % (ref 0–5)
LYMPHOCYTES # BLD: 1.7 K/UL (ref 0.5–4.6)
LYMPHOCYTES NFR BLD: 21.7 % (ref 13–44)
MCH RBC QN AUTO: 30.4 PG (ref 26.1–32.9)
MCHC RBC AUTO-ENTMCNC: 33.2 G/DL (ref 31.4–35)
MCV RBC AUTO: 91.4 FL (ref 82–102)
MONOCYTES # BLD: 0.87 K/UL (ref 0.1–1.3)
MONOCYTES NFR BLD: 11.1 % (ref 4–12)
NEUTS SEG # BLD: 5.14 K/UL (ref 1.7–8.2)
NEUTS SEG NFR BLD: 65.7 % (ref 43–78)
NRBC # BLD: 0 K/UL (ref 0–0.2)
PLATELET # BLD AUTO: 199 K/UL (ref 150–450)
PMV BLD AUTO: 11.8 FL (ref 9.4–12.3)
POTASSIUM SERPL-SCNC: 4 MMOL/L (ref 3.5–5.1)
RBC # BLD AUTO: 4.31 M/UL (ref 4.05–5.2)
SERVICE CMNT-IMP: ABNORMAL
SODIUM SERPL-SCNC: 146 MMOL/L (ref 136–145)
WBC # BLD AUTO: 7.8 K/UL (ref 4.3–11.1)

## 2025-03-22 PROCEDURE — 85025 COMPLETE CBC W/AUTO DIFF WBC: CPT

## 2025-03-22 PROCEDURE — 6360000002 HC RX W HCPCS: Performed by: NURSE PRACTITIONER

## 2025-03-22 PROCEDURE — 6360000004 HC RX CONTRAST MEDICATION: Performed by: NURSE PRACTITIONER

## 2025-03-22 PROCEDURE — C8929 TTE W OR WO FOL WCON,DOPPLER: HCPCS

## 2025-03-22 PROCEDURE — 6370000000 HC RX 637 (ALT 250 FOR IP): Performed by: NURSE PRACTITIONER

## 2025-03-22 PROCEDURE — 6360000002 HC RX W HCPCS: Performed by: FAMILY MEDICINE

## 2025-03-22 PROCEDURE — 82962 GLUCOSE BLOOD TEST: CPT

## 2025-03-22 PROCEDURE — 2500000003 HC RX 250 WO HCPCS: Performed by: FAMILY MEDICINE

## 2025-03-22 PROCEDURE — 36415 COLL VENOUS BLD VENIPUNCTURE: CPT

## 2025-03-22 PROCEDURE — 6370000000 HC RX 637 (ALT 250 FOR IP): Performed by: FAMILY MEDICINE

## 2025-03-22 PROCEDURE — 93306 TTE W/DOPPLER COMPLETE: CPT | Performed by: INTERNAL MEDICINE

## 2025-03-22 PROCEDURE — 1100000000 HC RM PRIVATE

## 2025-03-22 PROCEDURE — 80048 BASIC METABOLIC PNL TOTAL CA: CPT

## 2025-03-22 PROCEDURE — 2500000003 HC RX 250 WO HCPCS: Performed by: NURSE PRACTITIONER

## 2025-03-22 RX ORDER — BENZONATATE 100 MG/1
100 CAPSULE ORAL 3 TIMES DAILY PRN
Status: DISCONTINUED | OUTPATIENT
Start: 2025-03-22 | End: 2025-03-25 | Stop reason: HOSPADM

## 2025-03-22 RX ORDER — HYDROCODONE BITARTRATE AND HOMATROPINE METHYLBROMIDE ORAL SOLUTION 5; 1.5 MG/5ML; MG/5ML
5 LIQUID ORAL EVERY 4 HOURS PRN
Refills: 0 | Status: DISCONTINUED | OUTPATIENT
Start: 2025-03-22 | End: 2025-03-25 | Stop reason: HOSPADM

## 2025-03-22 RX ADMIN — KETOTIFEN FUMARATE 1 DROP: 0.25 SOLUTION/ DROPS OPHTHALMIC at 19:53

## 2025-03-22 RX ADMIN — ENOXAPARIN SODIUM 30 MG: 100 INJECTION SUBCUTANEOUS at 19:52

## 2025-03-22 RX ADMIN — PREDNISONE 5 MG: 10 TABLET ORAL at 07:41

## 2025-03-22 RX ADMIN — AMLODIPINE BESYLATE 10 MG: 10 TABLET ORAL at 07:41

## 2025-03-22 RX ADMIN — CEFAZOLIN 2000 MG: 10 INJECTION, POWDER, FOR SOLUTION INTRAVENOUS at 07:41

## 2025-03-22 RX ADMIN — CEFAZOLIN 2000 MG: 10 INJECTION, POWDER, FOR SOLUTION INTRAVENOUS at 00:00

## 2025-03-22 RX ADMIN — KETOTIFEN FUMARATE 1 DROP: 0.25 SOLUTION/ DROPS OPHTHALMIC at 07:44

## 2025-03-22 RX ADMIN — PREGABALIN 150 MG: 100 CAPSULE ORAL at 19:53

## 2025-03-22 RX ADMIN — DULOXETINE HYDROCHLORIDE 30 MG: 30 CAPSULE, DELAYED RELEASE ORAL at 07:41

## 2025-03-22 RX ADMIN — LEVOTHYROXINE SODIUM 50 MCG: 0.05 TABLET ORAL at 06:40

## 2025-03-22 RX ADMIN — PREGABALIN 150 MG: 100 CAPSULE ORAL at 07:41

## 2025-03-22 RX ADMIN — SULFUR HEXAFLUORIDE 4 ML: KIT at 14:10

## 2025-03-22 RX ADMIN — SODIUM CHLORIDE, PRESERVATIVE FREE 10 ML: 5 INJECTION INTRAVENOUS at 07:43

## 2025-03-22 RX ADMIN — ACETAMINOPHEN 650 MG: 325 TABLET ORAL at 19:01

## 2025-03-22 RX ADMIN — ZOLPIDEM TARTRATE 10 MG: 5 TABLET ORAL at 22:58

## 2025-03-22 RX ADMIN — ACETAMINOPHEN 650 MG: 325 TABLET ORAL at 07:41

## 2025-03-22 RX ADMIN — PREDNISONE 5 MG: 10 TABLET ORAL at 19:53

## 2025-03-22 RX ADMIN — ENOXAPARIN SODIUM 30 MG: 100 INJECTION SUBCUTANEOUS at 07:40

## 2025-03-22 RX ADMIN — HYDROCODONE BITARTRATE AND HOMATROPINE METHYLBROMIDE 5 ML: 1.5; 5 SOLUTION ORAL at 20:03

## 2025-03-22 RX ADMIN — SODIUM CHLORIDE, PRESERVATIVE FREE 10 ML: 5 INJECTION INTRAVENOUS at 21:17

## 2025-03-22 RX ADMIN — VITAMIN D, TAB 1000IU (100/BT) 5000 UNITS: 25 TAB at 07:41

## 2025-03-22 RX ADMIN — CETIRIZINE HYDROCHLORIDE 10 MG: 10 TABLET, FILM COATED ORAL at 19:53

## 2025-03-22 RX ADMIN — CEFAZOLIN 2000 MG: 10 INJECTION, POWDER, FOR SOLUTION INTRAVENOUS at 16:04

## 2025-03-22 RX ADMIN — POLYETHYLENE GLYCOL 3350 17 G: 17 POWDER, FOR SOLUTION ORAL at 23:06

## 2025-03-22 RX ADMIN — BENZONATATE 100 MG: 100 CAPSULE ORAL at 16:13

## 2025-03-22 RX ADMIN — CEFAZOLIN 2000 MG: 10 INJECTION, POWDER, FOR SOLUTION INTRAVENOUS at 23:21

## 2025-03-22 ASSESSMENT — PAIN DESCRIPTION - DESCRIPTORS: DESCRIPTORS: ACHING;THROBBING

## 2025-03-22 ASSESSMENT — PAIN SCALES - GENERAL: PAINLEVEL_OUTOF10: 9

## 2025-03-22 ASSESSMENT — PAIN DESCRIPTION - ORIENTATION: ORIENTATION: ANTERIOR

## 2025-03-22 ASSESSMENT — PAIN - FUNCTIONAL ASSESSMENT: PAIN_FUNCTIONAL_ASSESSMENT: PREVENTS OR INTERFERES SOME ACTIVE ACTIVITIES AND ADLS

## 2025-03-22 ASSESSMENT — PAIN DESCRIPTION - LOCATION: LOCATION: HEAD

## 2025-03-23 ENCOUNTER — APPOINTMENT (OUTPATIENT)
Dept: GENERAL RADIOLOGY | Age: 65
DRG: 871 | End: 2025-03-23
Payer: COMMERCIAL

## 2025-03-23 LAB
ANION GAP SERPL CALC-SCNC: 11 MMOL/L (ref 7–16)
BACTERIA SPEC CULT: ABNORMAL
BACTERIA SPEC CULT: ABNORMAL
BASOPHILS # BLD: 0.04 K/UL (ref 0–0.2)
BASOPHILS NFR BLD: 0.4 % (ref 0–2)
BUN SERPL-MCNC: 8 MG/DL (ref 8–23)
CALCIUM SERPL-MCNC: 8.8 MG/DL (ref 8.8–10.2)
CHLORIDE SERPL-SCNC: 107 MMOL/L (ref 98–107)
CO2 SERPL-SCNC: 27 MMOL/L (ref 20–29)
CREAT SERPL-MCNC: 0.44 MG/DL (ref 0.6–1.1)
DIFFERENTIAL METHOD BLD: ABNORMAL
EOSINOPHIL # BLD: 0.08 K/UL (ref 0–0.8)
EOSINOPHIL NFR BLD: 0.8 % (ref 0.5–7.8)
ERYTHROCYTE [DISTWIDTH] IN BLOOD BY AUTOMATED COUNT: 15.4 % (ref 11.9–14.6)
GLUCOSE BLD STRIP.AUTO-MCNC: 105 MG/DL (ref 65–100)
GLUCOSE BLD STRIP.AUTO-MCNC: 139 MG/DL (ref 65–100)
GLUCOSE BLD STRIP.AUTO-MCNC: 182 MG/DL (ref 65–100)
GLUCOSE BLD STRIP.AUTO-MCNC: 82 MG/DL (ref 65–100)
GLUCOSE SERPL-MCNC: 102 MG/DL (ref 70–99)
GRAM STN SPEC: ABNORMAL
HCT VFR BLD AUTO: 35.6 % (ref 35.8–46.3)
HGB BLD-MCNC: 11.5 G/DL (ref 11.7–15.4)
IMM GRANULOCYTES # BLD AUTO: 0.15 K/UL (ref 0–0.5)
IMM GRANULOCYTES NFR BLD AUTO: 1.5 % (ref 0–5)
LYMPHOCYTES # BLD: 2.27 K/UL (ref 0.5–4.6)
LYMPHOCYTES NFR BLD: 23.3 % (ref 13–44)
MCH RBC QN AUTO: 30.3 PG (ref 26.1–32.9)
MCHC RBC AUTO-ENTMCNC: 32.3 G/DL (ref 31.4–35)
MCV RBC AUTO: 93.7 FL (ref 82–102)
MONOCYTES # BLD: 0.88 K/UL (ref 0.1–1.3)
MONOCYTES NFR BLD: 9 % (ref 4–12)
NEUTS SEG # BLD: 6.33 K/UL (ref 1.7–8.2)
NEUTS SEG NFR BLD: 65 % (ref 43–78)
NRBC # BLD: 0 K/UL (ref 0–0.2)
PLATELET # BLD AUTO: 241 K/UL (ref 150–450)
PMV BLD AUTO: 11.4 FL (ref 9.4–12.3)
POTASSIUM SERPL-SCNC: 3.7 MMOL/L (ref 3.5–5.1)
RBC # BLD AUTO: 3.8 M/UL (ref 4.05–5.2)
SERVICE CMNT-IMP: ABNORMAL
SERVICE CMNT-IMP: NORMAL
SODIUM SERPL-SCNC: 144 MMOL/L (ref 136–145)
WBC # BLD AUTO: 9.8 K/UL (ref 4.3–11.1)

## 2025-03-23 PROCEDURE — 1100000000 HC RM PRIVATE

## 2025-03-23 PROCEDURE — 2500000003 HC RX 250 WO HCPCS: Performed by: NURSE PRACTITIONER

## 2025-03-23 PROCEDURE — 6360000002 HC RX W HCPCS: Performed by: FAMILY MEDICINE

## 2025-03-23 PROCEDURE — 36415 COLL VENOUS BLD VENIPUNCTURE: CPT

## 2025-03-23 PROCEDURE — 82962 GLUCOSE BLOOD TEST: CPT

## 2025-03-23 PROCEDURE — 97530 THERAPEUTIC ACTIVITIES: CPT

## 2025-03-23 PROCEDURE — 85025 COMPLETE CBC W/AUTO DIFF WBC: CPT

## 2025-03-23 PROCEDURE — 6360000002 HC RX W HCPCS: Performed by: NURSE PRACTITIONER

## 2025-03-23 PROCEDURE — 2500000003 HC RX 250 WO HCPCS: Performed by: FAMILY MEDICINE

## 2025-03-23 PROCEDURE — 80048 BASIC METABOLIC PNL TOTAL CA: CPT

## 2025-03-23 PROCEDURE — 6370000000 HC RX 637 (ALT 250 FOR IP): Performed by: FAMILY MEDICINE

## 2025-03-23 PROCEDURE — 6370000000 HC RX 637 (ALT 250 FOR IP): Performed by: NURSE PRACTITIONER

## 2025-03-23 PROCEDURE — 71045 X-RAY EXAM CHEST 1 VIEW: CPT

## 2025-03-23 RX ADMIN — PREGABALIN 150 MG: 100 CAPSULE ORAL at 08:08

## 2025-03-23 RX ADMIN — KETOTIFEN FUMARATE 1 DROP: 0.25 SOLUTION/ DROPS OPHTHALMIC at 21:31

## 2025-03-23 RX ADMIN — CEFAZOLIN 2000 MG: 10 INJECTION, POWDER, FOR SOLUTION INTRAVENOUS at 15:38

## 2025-03-23 RX ADMIN — CEFAZOLIN 2000 MG: 10 INJECTION, POWDER, FOR SOLUTION INTRAVENOUS at 23:35

## 2025-03-23 RX ADMIN — ENOXAPARIN SODIUM 30 MG: 100 INJECTION SUBCUTANEOUS at 21:31

## 2025-03-23 RX ADMIN — HYDROCODONE BITARTRATE AND HOMATROPINE METHYLBROMIDE 5 ML: 1.5; 5 SOLUTION ORAL at 23:46

## 2025-03-23 RX ADMIN — ZOLPIDEM TARTRATE 10 MG: 5 TABLET ORAL at 23:35

## 2025-03-23 RX ADMIN — PREDNISONE 5 MG: 10 TABLET ORAL at 21:31

## 2025-03-23 RX ADMIN — DULOXETINE HYDROCHLORIDE 30 MG: 30 CAPSULE, DELAYED RELEASE ORAL at 08:08

## 2025-03-23 RX ADMIN — ACETAMINOPHEN 650 MG: 325 TABLET ORAL at 08:17

## 2025-03-23 RX ADMIN — CEFAZOLIN 2000 MG: 10 INJECTION, POWDER, FOR SOLUTION INTRAVENOUS at 08:08

## 2025-03-23 RX ADMIN — KETOTIFEN FUMARATE 1 DROP: 0.25 SOLUTION/ DROPS OPHTHALMIC at 08:11

## 2025-03-23 RX ADMIN — VITAMIN D, TAB 1000IU (100/BT) 5000 UNITS: 25 TAB at 08:08

## 2025-03-23 RX ADMIN — SODIUM CHLORIDE, PRESERVATIVE FREE 10 ML: 5 INJECTION INTRAVENOUS at 08:12

## 2025-03-23 RX ADMIN — HYDROCODONE BITARTRATE AND HOMATROPINE METHYLBROMIDE 5 ML: 1.5; 5 SOLUTION ORAL at 09:11

## 2025-03-23 RX ADMIN — HYDROCODONE BITARTRATE AND HOMATROPINE METHYLBROMIDE 5 ML: 1.5; 5 SOLUTION ORAL at 05:02

## 2025-03-23 RX ADMIN — PREGABALIN 150 MG: 100 CAPSULE ORAL at 21:31

## 2025-03-23 RX ADMIN — AMLODIPINE BESYLATE 10 MG: 10 TABLET ORAL at 08:08

## 2025-03-23 RX ADMIN — ENOXAPARIN SODIUM 30 MG: 100 INJECTION SUBCUTANEOUS at 08:09

## 2025-03-23 RX ADMIN — SODIUM CHLORIDE, PRESERVATIVE FREE 10 ML: 5 INJECTION INTRAVENOUS at 22:17

## 2025-03-23 RX ADMIN — LEVOTHYROXINE SODIUM 50 MCG: 0.05 TABLET ORAL at 05:02

## 2025-03-23 RX ADMIN — CETIRIZINE HYDROCHLORIDE 10 MG: 10 TABLET, FILM COATED ORAL at 21:31

## 2025-03-23 RX ADMIN — POLYETHYLENE GLYCOL 3350 17 G: 17 POWDER, FOR SOLUTION ORAL at 23:35

## 2025-03-23 RX ADMIN — PREDNISONE 5 MG: 10 TABLET ORAL at 08:08

## 2025-03-23 RX ADMIN — HYDROCODONE BITARTRATE AND HOMATROPINE METHYLBROMIDE 5 ML: 1.5; 5 SOLUTION ORAL at 17:08

## 2025-03-23 ASSESSMENT — PAIN DESCRIPTION - DESCRIPTORS: DESCRIPTORS: ACHING

## 2025-03-23 ASSESSMENT — PAIN SCALES - GENERAL
PAINLEVEL_OUTOF10: 3
PAINLEVEL_OUTOF10: 1
PAINLEVEL_OUTOF10: 0

## 2025-03-23 ASSESSMENT — PAIN DESCRIPTION - LOCATION: LOCATION: HEAD

## 2025-03-24 LAB
ALBUMIN SERPL-MCNC: 2.5 G/DL (ref 3.2–4.6)
ALBUMIN/GLOB SERPL: 0.6 (ref 1–1.9)
ALP SERPL-CCNC: 138 U/L (ref 35–104)
ALT SERPL-CCNC: 77 U/L (ref 8–45)
ANION GAP SERPL CALC-SCNC: 11 MMOL/L (ref 7–16)
AST SERPL-CCNC: 86 U/L (ref 15–37)
BASOPHILS # BLD: 0.04 K/UL (ref 0–0.2)
BASOPHILS NFR BLD: 0.5 % (ref 0–2)
BILIRUB DIRECT SERPL-MCNC: <0.2 MG/DL (ref 0–0.4)
BILIRUB SERPL-MCNC: 0.2 MG/DL (ref 0–1.2)
BUN SERPL-MCNC: 7 MG/DL (ref 8–23)
CALCIUM SERPL-MCNC: 9.2 MG/DL (ref 8.8–10.2)
CHLORIDE SERPL-SCNC: 106 MMOL/L (ref 98–107)
CO2 SERPL-SCNC: 27 MMOL/L (ref 20–29)
CREAT SERPL-MCNC: 0.42 MG/DL (ref 0.6–1.1)
CRP SERPL-MCNC: 7.7 MG/DL (ref 0–0.4)
DIFFERENTIAL METHOD BLD: ABNORMAL
EOSINOPHIL # BLD: 0.09 K/UL (ref 0–0.8)
EOSINOPHIL NFR BLD: 1.1 % (ref 0.5–7.8)
ERYTHROCYTE [DISTWIDTH] IN BLOOD BY AUTOMATED COUNT: 15.5 % (ref 11.9–14.6)
GLOBULIN SER CALC-MCNC: 4.1 G/DL (ref 2.3–3.5)
GLUCOSE BLD STRIP.AUTO-MCNC: 98 MG/DL (ref 65–100)
GLUCOSE SERPL-MCNC: 137 MG/DL (ref 70–99)
HCT VFR BLD AUTO: 36.8 % (ref 35.8–46.3)
HGB BLD-MCNC: 11.6 G/DL (ref 11.7–15.4)
IMM GRANULOCYTES # BLD AUTO: 0.14 K/UL (ref 0–0.5)
IMM GRANULOCYTES NFR BLD AUTO: 1.7 % (ref 0–5)
LYMPHOCYTES # BLD: 1.81 K/UL (ref 0.5–4.6)
LYMPHOCYTES NFR BLD: 22.4 % (ref 13–44)
MCH RBC QN AUTO: 30 PG (ref 26.1–32.9)
MCHC RBC AUTO-ENTMCNC: 31.5 G/DL (ref 31.4–35)
MCV RBC AUTO: 95.1 FL (ref 82–102)
MONOCYTES # BLD: 0.87 K/UL (ref 0.1–1.3)
MONOCYTES NFR BLD: 10.7 % (ref 4–12)
NEUTS SEG # BLD: 5.15 K/UL (ref 1.7–8.2)
NEUTS SEG NFR BLD: 63.6 % (ref 43–78)
NRBC # BLD: 0 K/UL (ref 0–0.2)
PLATELET # BLD AUTO: 262 K/UL (ref 150–450)
PLATELET COMMENT: ADEQUATE
PMV BLD AUTO: 12.1 FL (ref 9.4–12.3)
POTASSIUM SERPL-SCNC: 4 MMOL/L (ref 3.5–5.1)
PROT SERPL-MCNC: 6.6 G/DL (ref 6.3–8.2)
RBC # BLD AUTO: 3.87 M/UL (ref 4.05–5.2)
RBC MORPH BLD: ABNORMAL
RBC MORPH BLD: ABNORMAL
SERVICE CMNT-IMP: NORMAL
SODIUM SERPL-SCNC: 144 MMOL/L (ref 136–145)
WBC # BLD AUTO: 8.1 K/UL (ref 4.3–11.1)
WBC MORPH BLD: ABNORMAL

## 2025-03-24 PROCEDURE — 36415 COLL VENOUS BLD VENIPUNCTURE: CPT

## 2025-03-24 PROCEDURE — 36569 INSJ PICC 5 YR+ W/O IMAGING: CPT

## 2025-03-24 PROCEDURE — 2500000003 HC RX 250 WO HCPCS: Performed by: NURSE PRACTITIONER

## 2025-03-24 PROCEDURE — 80048 BASIC METABOLIC PNL TOTAL CA: CPT

## 2025-03-24 PROCEDURE — 80076 HEPATIC FUNCTION PANEL: CPT

## 2025-03-24 PROCEDURE — 85025 COMPLETE CBC W/AUTO DIFF WBC: CPT

## 2025-03-24 PROCEDURE — 86140 C-REACTIVE PROTEIN: CPT

## 2025-03-24 PROCEDURE — 1100000000 HC RM PRIVATE

## 2025-03-24 PROCEDURE — 6360000002 HC RX W HCPCS: Performed by: FAMILY MEDICINE

## 2025-03-24 PROCEDURE — 6370000000 HC RX 637 (ALT 250 FOR IP): Performed by: FAMILY MEDICINE

## 2025-03-24 PROCEDURE — 82962 GLUCOSE BLOOD TEST: CPT

## 2025-03-24 PROCEDURE — 97530 THERAPEUTIC ACTIVITIES: CPT

## 2025-03-24 PROCEDURE — 2500000003 HC RX 250 WO HCPCS: Performed by: FAMILY MEDICINE

## 2025-03-24 PROCEDURE — 02HV33Z INSERTION OF INFUSION DEVICE INTO SUPERIOR VENA CAVA, PERCUTANEOUS APPROACH: ICD-10-PCS | Performed by: INTERNAL MEDICINE

## 2025-03-24 PROCEDURE — 6360000002 HC RX W HCPCS: Performed by: NURSE PRACTITIONER

## 2025-03-24 PROCEDURE — 6370000000 HC RX 637 (ALT 250 FOR IP): Performed by: NURSE PRACTITIONER

## 2025-03-24 PROCEDURE — C1751 CATH, INF, PER/CENT/MIDLINE: HCPCS

## 2025-03-24 RX ADMIN — KETOTIFEN FUMARATE 1 DROP: 0.25 SOLUTION/ DROPS OPHTHALMIC at 19:48

## 2025-03-24 RX ADMIN — CEFAZOLIN 2000 MG: 10 INJECTION, POWDER, FOR SOLUTION INTRAVENOUS at 17:34

## 2025-03-24 RX ADMIN — PREGABALIN 150 MG: 100 CAPSULE ORAL at 19:47

## 2025-03-24 RX ADMIN — LEVOTHYROXINE SODIUM 50 MCG: 0.05 TABLET ORAL at 06:00

## 2025-03-24 RX ADMIN — DULOXETINE HYDROCHLORIDE 30 MG: 30 CAPSULE, DELAYED RELEASE ORAL at 08:59

## 2025-03-24 RX ADMIN — PREDNISONE 5 MG: 10 TABLET ORAL at 08:59

## 2025-03-24 RX ADMIN — SODIUM CHLORIDE, PRESERVATIVE FREE 10 ML: 5 INJECTION INTRAVENOUS at 21:25

## 2025-03-24 RX ADMIN — CETIRIZINE HYDROCHLORIDE 10 MG: 10 TABLET, FILM COATED ORAL at 19:48

## 2025-03-24 RX ADMIN — KETOTIFEN FUMARATE 1 DROP: 0.25 SOLUTION/ DROPS OPHTHALMIC at 09:02

## 2025-03-24 RX ADMIN — PREDNISONE 5 MG: 10 TABLET ORAL at 19:47

## 2025-03-24 RX ADMIN — AMLODIPINE BESYLATE 10 MG: 10 TABLET ORAL at 08:59

## 2025-03-24 RX ADMIN — PREGABALIN 150 MG: 100 CAPSULE ORAL at 08:59

## 2025-03-24 RX ADMIN — ENOXAPARIN SODIUM 30 MG: 100 INJECTION SUBCUTANEOUS at 19:48

## 2025-03-24 RX ADMIN — SODIUM CHLORIDE, PRESERVATIVE FREE 10 ML: 5 INJECTION INTRAVENOUS at 08:58

## 2025-03-24 RX ADMIN — CEFAZOLIN 2000 MG: 10 INJECTION, POWDER, FOR SOLUTION INTRAVENOUS at 08:58

## 2025-03-24 RX ADMIN — ENOXAPARIN SODIUM 30 MG: 100 INJECTION SUBCUTANEOUS at 08:58

## 2025-03-24 RX ADMIN — VITAMIN D, TAB 1000IU (100/BT) 5000 UNITS: 25 TAB at 08:58

## 2025-03-24 ASSESSMENT — PAIN SCALES - GENERAL
PAINLEVEL_OUTOF10: 0
PAINLEVEL_OUTOF10: 0

## 2025-03-24 NOTE — CONSULTS
PICC Placement Note    PRE-PROCEDURE VERIFICATION    Correct Procedure: yes  Time out completed with assistant Wilmer Resendez RN and all persons present in agreement with time out.  Risks and benefits reviewed with Patient and informed consent obtained prior to assessment and procedure.     Correct Site: yes  Temperature: Temp: 98.4 °F (36.9 °C), Temperature Source:    Recent Labs     03/24/25  0528   BUN 7*      WBC 8.1     Allergies: Codeine and Penicillins  Education materials for PICC Care given to patient or family.    PROCEDURE DETAIL  A single lumen PICC line was started for antibiotic therapy. The following documentation is in addition to the PICC properties in the lines/airways flowsheet:    Lidocaine used: Yes  Mid-Arm Circumference: 43 (cm)  Internal Catheter Length: 42 (cm)  External Catheter Length: 0 (cm)  Total Catheter Length: 42 (cm)  Vein Selection for PICC: right cephalic  Central Line Insertion Bundle followed: Yes  Complication Related to Insertion: none    Both the insertion guidewire and ECG guidewire were removed intact all ports have positive blood return and were flush well with normal saline.    The location of the tip of the PICC is verified using ECG technology.  The tip is in the SVC per ECG reading.  See image below.     Line is okay to use: yes      Tesha Gaines RN    
Vomiting and Nausea Only     Pt states \"a lot of pain medications maker her sick\"    Penicillins Nausea And Vomiting and Nausea Only     Pt states possible allergy to PCN        Review of Systems:  All systems reviewed and negative except as otherwise stated in the HPI    Objective:   Blood pressure (!) 121/57, pulse 95, temperature 100.2 °F (37.9 °C), temperature source Oral, resp. rate 18, height 1.702 m (5' 7\"), weight 124.2 kg (273 lb 14.4 oz), SpO2 92%.  Visit Vitals  BP (!) 121/57   Pulse 95   Temp 100.2 °F (37.9 °C) (Oral)   Resp 18   Ht 1.702 m (5' 7\")   Wt 124.2 kg (273 lb 14.4 oz)   SpO2 92%   BMI 42.90 kg/m²     Temp (24hrs), Av.6 °F (37 °C), Min:97.9 °F (36.6 °C), Max:100.2 °F (37.9 °C)       Exam:    General:  Alert, cooperative, obese, well developed, appears stated age   Eyes:  Sclera anicteric. Pupils equally round and reactive to light.   Mouth/Throat: Mucous membranes normal, oral pharynx clear   Neck: Supple   Lungs:   Clear to auscultation bilaterally, good effort   CV:  Regular rate and rhythm,no murmur, click, rub or gallop   Abdomen:   Soft, non-tender. bowel sounds normal. non-distended   Extremities: No cyanosis or edema   Skin: Skin color, texture, turgor normal. no acute rash or lesions   Lymph nodes: Cervical and supraclavicular normal   Musculoskeletal: No swelling or deformity   Lines/Devices:  Intact, no erythema, drainage or tenderness   Psych: Alert and oriented, normal mood affect given the setting       CBC:  Recent Labs     25  1242 25  0610 25  0352   WBC 12.8* 8.9 9.5   HGB 13.6 12.3 13.7   HCT 40.3 37.7 43.6    187 180       BMP:  Recent Labs     25  1242 25  0610 25  0352   BUN 19 12 12    143 144   K 3.9 3.4* 4.2    111* 110*   CO2 22 21 20       LFTS:  Recent Labs     25  1242   ALT 45       Data Review:   Recent Results (from the past 24 hours)   POCT Glucose    Collection Time: 25  4:16 PM   Result

## 2025-03-24 NOTE — CARE COORDINATION
RN CM in room, patient sitting up in bed, alert and oriented, talked about going home with PICC line, Intramed IV therapy and HH. Choice list given and patient will let us know when she has chosen HH. Referral to [facility] and [facility] aware. POONAM THOMPSON will continue to follow.     0238 Update - patient requested [facility] after looking over choices and referral sent.     1035 update - [facility] can not staff case at this time, patient selected [facility] and referral sent

## 2025-03-25 VITALS
DIASTOLIC BLOOD PRESSURE: 58 MMHG | WEIGHT: 273.1 LBS | TEMPERATURE: 98.6 F | OXYGEN SATURATION: 93 % | BODY MASS INDEX: 42.87 KG/M2 | HEIGHT: 67 IN | SYSTOLIC BLOOD PRESSURE: 136 MMHG | HEART RATE: 86 BPM | RESPIRATION RATE: 16 BRPM

## 2025-03-25 LAB
ALBUMIN SERPL-MCNC: 2.4 G/DL (ref 3.2–4.6)
ALBUMIN/GLOB SERPL: 0.6 (ref 1–1.9)
ALP SERPL-CCNC: 137 U/L (ref 35–104)
ALT SERPL-CCNC: 55 U/L (ref 8–45)
ANION GAP SERPL CALC-SCNC: 9 MMOL/L (ref 7–16)
AST SERPL-CCNC: 48 U/L (ref 15–37)
BASOPHILS # BLD: 0.03 K/UL (ref 0–0.2)
BASOPHILS NFR BLD: 0.3 % (ref 0–2)
BILIRUB DIRECT SERPL-MCNC: <0.2 MG/DL (ref 0–0.4)
BILIRUB SERPL-MCNC: 0.2 MG/DL (ref 0–1.2)
BUN SERPL-MCNC: 8 MG/DL (ref 8–23)
CALCIUM SERPL-MCNC: 9.2 MG/DL (ref 8.8–10.2)
CHLORIDE SERPL-SCNC: 104 MMOL/L (ref 98–107)
CO2 SERPL-SCNC: 29 MMOL/L (ref 20–29)
CREAT SERPL-MCNC: 0.48 MG/DL (ref 0.6–1.1)
DIFFERENTIAL METHOD BLD: ABNORMAL
EOSINOPHIL # BLD: 0.11 K/UL (ref 0–0.8)
EOSINOPHIL NFR BLD: 1.3 % (ref 0.5–7.8)
ERYTHROCYTE [DISTWIDTH] IN BLOOD BY AUTOMATED COUNT: 15.8 % (ref 11.9–14.6)
GLOBULIN SER CALC-MCNC: 3.9 G/DL (ref 2.3–3.5)
GLUCOSE SERPL-MCNC: 121 MG/DL (ref 70–99)
HCT VFR BLD AUTO: 33.7 % (ref 35.8–46.3)
HGB BLD-MCNC: 11.2 G/DL (ref 11.7–15.4)
IMM GRANULOCYTES # BLD AUTO: 0.15 K/UL (ref 0–0.5)
IMM GRANULOCYTES NFR BLD AUTO: 1.7 % (ref 0–5)
LYMPHOCYTES # BLD: 2.33 K/UL (ref 0.5–4.6)
LYMPHOCYTES NFR BLD: 27.1 % (ref 13–44)
MCH RBC QN AUTO: 30.5 PG (ref 26.1–32.9)
MCHC RBC AUTO-ENTMCNC: 33.2 G/DL (ref 31.4–35)
MCV RBC AUTO: 91.8 FL (ref 82–102)
MONOCYTES # BLD: 0.99 K/UL (ref 0.1–1.3)
MONOCYTES NFR BLD: 11.5 % (ref 4–12)
NEUTS SEG # BLD: 4.99 K/UL (ref 1.7–8.2)
NEUTS SEG NFR BLD: 58.1 % (ref 43–78)
NRBC # BLD: 0 K/UL (ref 0–0.2)
PLATELET # BLD AUTO: 323 K/UL (ref 150–450)
PLATELET COMMENT: ADEQUATE
PMV BLD AUTO: 11.2 FL (ref 9.4–12.3)
POTASSIUM SERPL-SCNC: 3.7 MMOL/L (ref 3.5–5.1)
PROT SERPL-MCNC: 6.3 G/DL (ref 6.3–8.2)
RBC # BLD AUTO: 3.67 M/UL (ref 4.05–5.2)
RBC MORPH BLD: ABNORMAL
RBC MORPH BLD: ABNORMAL
SODIUM SERPL-SCNC: 142 MMOL/L (ref 136–145)
WBC # BLD AUTO: 8.6 K/UL (ref 4.3–11.1)
WBC MORPH BLD: ABNORMAL

## 2025-03-25 PROCEDURE — 6360000002 HC RX W HCPCS: Performed by: FAMILY MEDICINE

## 2025-03-25 PROCEDURE — 2500000003 HC RX 250 WO HCPCS: Performed by: NURSE PRACTITIONER

## 2025-03-25 PROCEDURE — 97530 THERAPEUTIC ACTIVITIES: CPT

## 2025-03-25 PROCEDURE — 36592 COLLECT BLOOD FROM PICC: CPT

## 2025-03-25 PROCEDURE — 80048 BASIC METABOLIC PNL TOTAL CA: CPT

## 2025-03-25 PROCEDURE — 80076 HEPATIC FUNCTION PANEL: CPT

## 2025-03-25 PROCEDURE — 6370000000 HC RX 637 (ALT 250 FOR IP): Performed by: FAMILY MEDICINE

## 2025-03-25 PROCEDURE — 2500000003 HC RX 250 WO HCPCS: Performed by: FAMILY MEDICINE

## 2025-03-25 PROCEDURE — 6370000000 HC RX 637 (ALT 250 FOR IP): Performed by: NURSE PRACTITIONER

## 2025-03-25 PROCEDURE — 85025 COMPLETE CBC W/AUTO DIFF WBC: CPT

## 2025-03-25 PROCEDURE — 6360000002 HC RX W HCPCS: Performed by: NURSE PRACTITIONER

## 2025-03-25 RX ORDER — DEXTROMETHORPHAN HBR. AND GUAIFENESIN 10; 100 MG/5ML; MG/5ML
10 SOLUTION ORAL EVERY 6 HOURS PRN
Qty: 1 EACH | COMMUNITY
Start: 2025-03-25 | End: 2025-03-30

## 2025-03-25 RX ORDER — CEFADROXIL 1000 MG/1
1 TABLET ORAL 2 TIMES DAILY
Qty: 26 TABLET | Refills: 0 | Status: SHIPPED | OUTPATIENT
Start: 2025-04-19 | End: 2025-05-02

## 2025-03-25 RX ORDER — BENZONATATE 100 MG/1
100 CAPSULE ORAL 3 TIMES DAILY PRN
Qty: 20 CAPSULE | Refills: 0 | Status: SHIPPED | OUTPATIENT
Start: 2025-03-25 | End: 2025-04-01

## 2025-03-25 RX ADMIN — ZOLPIDEM TARTRATE 10 MG: 5 TABLET ORAL at 00:05

## 2025-03-25 RX ADMIN — VITAMIN D, TAB 1000IU (100/BT) 5000 UNITS: 25 TAB at 08:17

## 2025-03-25 RX ADMIN — HYDROCODONE BITARTRATE AND HOMATROPINE METHYLBROMIDE 5 ML: 1.5; 5 SOLUTION ORAL at 00:05

## 2025-03-25 RX ADMIN — ENOXAPARIN SODIUM 30 MG: 100 INJECTION SUBCUTANEOUS at 08:17

## 2025-03-25 RX ADMIN — KETOTIFEN FUMARATE 1 DROP: 0.25 SOLUTION/ DROPS OPHTHALMIC at 08:23

## 2025-03-25 RX ADMIN — DULOXETINE HYDROCHLORIDE 30 MG: 30 CAPSULE, DELAYED RELEASE ORAL at 08:17

## 2025-03-25 RX ADMIN — PREDNISONE 5 MG: 10 TABLET ORAL at 08:17

## 2025-03-25 RX ADMIN — AMLODIPINE BESYLATE 10 MG: 10 TABLET ORAL at 08:17

## 2025-03-25 RX ADMIN — CEFAZOLIN 2000 MG: 10 INJECTION, POWDER, FOR SOLUTION INTRAVENOUS at 00:05

## 2025-03-25 RX ADMIN — CEFAZOLIN 2000 MG: 10 INJECTION, POWDER, FOR SOLUTION INTRAVENOUS at 08:18

## 2025-03-25 RX ADMIN — PREGABALIN 150 MG: 100 CAPSULE ORAL at 08:17

## 2025-03-25 RX ADMIN — SODIUM CHLORIDE, PRESERVATIVE FREE 10 ML: 5 INJECTION INTRAVENOUS at 08:18

## 2025-03-25 RX ADMIN — LEVOTHYROXINE SODIUM 50 MCG: 0.05 TABLET ORAL at 05:35

## 2025-03-25 NOTE — PLAN OF CARE
Problem: Safety - Adult  Goal: Free from fall injury  3/25/2025 0725 by Ingrid Kirkpatrick, RN  Outcome: Progressing  3/25/2025 0308 by Rubi Godoy, RN  Outcome: Progressing     Problem: Chronic Conditions and Co-morbidities  Goal: Patient's chronic conditions and co-morbidity symptoms are monitored and maintained or improved  Outcome: Progressing     Problem: Pain  Goal: Verbalizes/displays adequate comfort level or baseline comfort level  Outcome: Progressing

## 2025-03-25 NOTE — PROGRESS NOTES
Hospitalist Progress Note   Admit Date:  3/19/2025 11:56 AM   Name:  Daja Armas   Age:  65 y.o.  Sex:  female  :  1960   MRN:  [id number]   Room:  [facility]/    Presenting/Chief Complaint: Altered Mental Status and Fever     Reason(s) for Admission: Acute UTI [N39.0]  Sepsis (HCC) [A41.9]  Sepsis with encephalopathy without septic shock, due to unspecified organism (HCC) [A41.9, R65.20, G93.41]     Hospital Course:   Daja Armas is a 65 y.o. female with medical history of rheumatoid arthritis, chronic back pain follows with pain management, diabetes mellitus type 2, hypertension, hyperlipidemia, acid reflux, BMI of 39.94, hypothyroidism, on chronic prednisone.  who presented due to altered mental status, emesis, urinary incontinence. Of report/note family had the Police Department go out to the house for a well check as patient had not been heard from for a couple of days and reportedly called her work the day before and stated she was not coming in. When the police got to patient's house they tried knocking the door but had to force entry through a window. When they got inside they found the patient sitting by the back patio in a chair staring. Patient was able to answer questions but was very confused and repeating the words you say to her back. She was also  covered in urine and vomit. Patient lives at home alone. When she was brought to the ER, vitals reported/noted as temp of 101.7, heart rate 111.Labs showed elevated creatinine at  3.3, elevated glucose of 156, elevated pro calcitonin of 3.18, initial troponin of 34, elevated AST of 132(no history of alcohol use) , elevated WBC 12.8. UA showed positive for moderate amount of bilirubin, ketones of 15, moderate leukocyte esterase, WBC greater than 100, bacteria 3+. Urine culture obtained. CXR showed diffuse haziness over both lung fields possibly related to patient body habitus. No focal infiltrates are noted. CT  head showed no acute 
       Hospitalist Progress Note   Admit Date:  3/19/2025 11:56 AM   Name:  Daja Armas   Age:  65 y.o.  Sex:  female  :  1960   MRN:  [id number]   Room:  [facility]/    Presenting/Chief Complaint: Altered Mental Status and Fever     Reason(s) for Admission: Acute UTI [N39.0]  Sepsis (HCC) [A41.9]  Sepsis with encephalopathy without septic shock, due to unspecified organism (HCC) [A41.9, R65.20, G93.41]     Hospital Course:   Daja Armas is a 65 y.o. female with medical history of rheumatoid arthritis, chronic back pain follows with pain management, diabetes mellitus type 2, hypertension, hyperlipidemia, acid reflux, BMI of 39.94, hypothyroidism, on chronic prednisone.  who presented due to altered mental status, emesis, urinary incontinence. Of report/note family had the Police Department go out to the house for a well check as patient had not been heard from for a couple of days and reportedly called her work the day before and stated she was not coming in. When the police got to patient's house they tried knocking the door but had to force entry through a window. When they got inside they found the patient sitting by the back patio in a chair staring. Patient was able to answer questions but was very confused and repeating the words you say to her back. She was also  covered in urine and vomit. Patient lives at home alone. When she was brought to the ER, vitals reported/noted as temp of 101.7, heart rate 111.Labs showed elevated creatinine at  3.3, elevated glucose of 156, elevated pro calcitonin of 3.18, initial troponin of 34, elevated AST of 132(no history of alcohol use) , elevated WBC 12.8. UA showed positive for moderate amount of bilirubin, ketones of 15, moderate leukocyte esterase, WBC greater than 100, bacteria 3+. Urine culture obtained. CXR showed diffuse haziness over both lung fields possibly related to patient body habitus. No focal infiltrates are noted. CT  head showed no acute 
       Hospitalist Progress Note   Admit Date:  3/19/2025 11:56 AM   Name:  Daja Armas   Age:  65 y.o.  Sex:  female  :  1960   MRN:  [id number]   Room:  [facility]/    Presenting/Chief Complaint: Altered Mental Status and Fever     Reason(s) for Admission: Acute UTI [N39.0]  Sepsis (HCC) [A41.9]  Sepsis with encephalopathy without septic shock, due to unspecified organism (HCC) [A41.9, R65.20, G93.41]     Hospital Course:   Daja Armas is a 65 y.o. female with medical history of rheumatoid arthritis, chronic back pain follows with pain management, diabetes mellitus type 2, hypertension, hyperlipidemia, acid reflux, BMI of 39.94, hypothyroidism, on chronic prednisone.  who presented due to altered mental status, emesis, urinary incontinence. Of report/note family had the Police Department go out to the house for a well check as patient had not been heard from for a couple of days and reportedly called her work the day before and stated she was not coming in. When the police got to patient's house they tried knocking the door but had to force entry through a window. When they got inside they found the patient sitting by the back patio in a chair staring. Patient was able to answer questions but was very confused and repeating the words you say to her back. She was also  covered in urine and vomit. Patient lives at home alone. When she was brought to the ER, vitals reported/noted as temp of 101.7, heart rate 111.Labs showed elevated creatinine at  3.3, elevated glucose of 156, elevated pro calcitonin of 3.18, initial troponin of 34, elevated AST of 132(no history of alcohol use) , elevated WBC 12.8. UA showed positive for moderate amount of bilirubin, ketones of 15, moderate leukocyte esterase, WBC greater than 100, bacteria 3+. Urine culture obtained. CXR showed diffuse haziness over both lung fields possibly related to patient body habitus. No focal infiltrates are noted. CT  head showed no acute 
ACUTE OCCUPATIONAL THERAPY GOALS:   (Developed with and agreed upon by patient and/or caregiver.)  1. Patient will complete lower body bathing and dressing with MOD I and adaptive equipment as needed.   2.Patient will complete upper body bathing and dressing with MOD I and adaptive equipment as needed.  3. Patient will complete toileting with MOD I.   4. Patient will tolerate 30 minutes of OT treatment with 1-2 rest breaks to increase activity tolerance for ADLs.   5. Patient will complete functional transfers with MOD I and adaptive equipment as needed.   6. Patient will complete functional activity with MOD I and adaptive equipment as needed.  7. Patient will complete simple grooming task standing at the sink with MOD I.    Timeframe: 7 visits      OCCUPATIONAL THERAPY Initial Assessment and Daily Note       OT Visit Days: 1  Acknowledge Orders  Time  OT Charge Capture  Rehab Caseload Tracker      Daja Armas is a 65 y.o. female   PRIMARY DIAGNOSIS: Sepsis (HCC)  Acute UTI [N39.0]  Sepsis (HCC) [A41.9]  Sepsis with encephalopathy without septic shock, due to unspecified organism (HCC) [A41.9, R65.20, G93.41]       Reason for Referral: Generalized Muscle Weakness (M62.81)  Other lack of cordination (R27.8)  Difficulty in walking, Not elsewhere classified (R26.2)  Inpatient: Payor: [employer] / Plan: [employer] / Product Type: *No Product type* /     ASSESSMENT:     REHAB RECOMMENDATIONS:   Recommendation to date pending progress:  Setting:  Home Health Therapy    Equipment:    To Be Determined     ASSESSMENT:  Ms. Armas presented to the hospital with AMS following a wellness check--admitted with sepsis. At baseline, pt is mod I for bADLs and mobility with a rollator. Still works full-time and drives.   Today, pt was received sitting in the chair. Completed LB dressing, functional transfers, ambulation with rolling walker, and grooming tasks standing at the sink with overall CGA. Rest breaks required. Recommend pt 
ACUTE PHYSICAL THERAPY GOALS:   (Developed with and agreed upon by patient and/or caregiver.)  Daja Armas  will demonstrate sup<>sit transfer with I using bedrails in 7 therapy sessions to improve bed mobility.  Daja Armas will demonstrate STS transfer with MI using BUE support and LRAD in 7 therapy sessions to improve transfers. (MET 3/23/25)  Daja Armas will ambulate with MI and LRAD x 150 ft in 7 therapy sessions to improve functional mobility. (MET 3/23/25)  Daja Armas will tolerate 15 min of TA/TE in 7 therapy sessions to improve strength and endurance.  Daja Armas will improve AMPAC score to 22 / 24 in 7 therapy sessions to demonstrate reduced fall risk    PHYSICAL THERAPY: Daily Note AM   (Link to Caseload Tracking: PT Visit Days : 3  Time In/Out PT Charge Capture  Rehab Caseload Tracker  Orders    Daja Armas is a 65 y.o. female   PRIMARY DIAGNOSIS: Sepsis (HCC)  Acute UTI [N39.0]  Sepsis (HCC) [A41.9]  Sepsis with encephalopathy without septic shock, due to unspecified organism (HCC) [A41.9, R65.20, G93.41]       Inpatient: Payor: JONEL / Plan: [employer] / Product Type: *No Product type* /     ASSESSMENT:     REHAB RECOMMENDATIONS:   Recommendation to date pending progress:  Setting:  Home Health Therapy    Equipment:    None     ASSESSMENT:  Ms. Armas was sitting on edge of bed on arrival and agreeable to therapy. She ambulated 250' with RW and SBA-- slow, steady gait. She returned back to edge of bed with needs in reach. She was a bit winded after gait with SpO2 in the low 90s on RA. Continues to do well with mobility.      SUBJECTIVE:   Ms. Armas states, \"I'm winded\"     Social/Functional Lives With: Alone  Type of Home: House  Home Layout: One level  Home Access: Ramped entrance  Bathroom Shower/Tub: Tub/Shower unit, Shower chair with back  Bathroom Equipment: Shower chair  Home Equipment: Walker - 4-Wheeled  Has the patient had two or more falls in the past year or any fall with 
ACUTE PHYSICAL THERAPY GOALS:   (Developed with and agreed upon by patient and/or caregiver.)  Daja Armas  will demonstrate sup<>sit transfer with I using bedrails in 7 therapy sessions to improve bed mobility.  Daja Armas will demonstrate STS transfer with MI using BUE support and LRAD in 7 therapy sessions to improve transfers. (MET 3/23/25)  Daja Armas will ambulate with MI and LRAD x 150 ft in 7 therapy sessions to improve functional mobility. (MET 3/23/25)  Daja Armas will tolerate 15 min of TA/TE in 7 therapy sessions to improve strength and endurance.  Daja Armas will improve AMPAC score to 22 / 24 in 7 therapy sessions to demonstrate reduced fall risk    PHYSICAL THERAPY: Daily Note AM   (Link to Caseload Tracking: PT Visit Days : 4  Time In/Out PT Charge Capture  Rehab Caseload Tracker  Orders    Daja Armas is a 65 y.o. female   PRIMARY DIAGNOSIS: Sepsis (HCC)  Acute UTI [N39.0]  Sepsis (HCC) [A41.9]  Sepsis with encephalopathy without septic shock, due to unspecified organism (HCC) [A41.9, R65.20, G93.41]       Inpatient: Payor: JONEL / Plan: [employer] / Product Type: *No Product type* /     ASSESSMENT:     REHAB RECOMMENDATIONS:   Recommendation to date pending progress:  Setting:  Home Health Therapy    Equipment:    None     ASSESSMENT:  Ms. Armas was sitting up in chair on contact, agreeable to therapy.  She donned her shoes and then stood and ambulated about 320' using rolling walker and SBA.  She is a little winded at end of ambulation, sats 90-91%.  She returned to side of bed and was left with needs in reach, staff at bedside.  She is making good progress towards goals. Will continue with POC.       SUBJECTIVE:   Ms. Armas states, \"I'm going home today.\"     Social/Functional Lives With: Alone  Type of Home: House  Home Layout: One level  Home Access: Ramped entrance  Bathroom Shower/Tub: Tub/Shower unit, Shower chair with back  Bathroom Equipment: Shower chair  Home 
ACUTE PHYSICAL THERAPY GOALS:   (Developed with and agreed upon by patient and/or caregiver.)  Daja Armas  will demonstrate sup<>sit transfer with I using bedrails in 7 therapy sessions to improve bed mobility.  Daja Armas will demonstrate STS transfer with MI using BUE support and LRAD in 7 therapy sessions to improve transfers. (MET 3/23/25)  Daja Armas will ambulate with MI and LRAD x 150 ft in 7 therapy sessions to improve functional mobility. (MET 3/23/25)  Daja Armas will tolerate 15 min of TA/TE in 7 therapy sessions to improve strength and endurance.  Daja Armas will improve AMPAC score to 22 / 24 in 7 therapy sessions to demonstrate reduced fall risk    PHYSICAL THERAPY: Daily Note PM   (Link to Caseload Tracking: PT Visit Days : 2  Time In/Out PT Charge Capture  Rehab Caseload Tracker  Orders    Daja Armas is a 65 y.o. female   PRIMARY DIAGNOSIS: Sepsis (HCC)  Acute UTI [N39.0]  Sepsis (HCC) [A41.9]  Sepsis with encephalopathy without septic shock, due to unspecified organism (HCC) [A41.9, R65.20, G93.41]       Inpatient: Payor: JONEL / Plan: [employer] / Product Type: *No Product type* /     ASSESSMENT:     REHAB RECOMMENDATIONS:   Recommendation to date pending progress:  Setting:  Home Health Therapy    Equipment:    None     ASSESSMENT:  Ms. Armas presents supine in bed with family present in room and agreeable to therapy. Patient states she is feeling much better. She was able to transfer with supervision from supine to sit and stood and transferred into the bathroom with supervision/SBA with no AD. After ADLs in bathroom, she ambulated in the hallway with the RW x250ft and supervision. Patient initially demonstrated a good jelani, then slowed down due to LBP. Patient returned to room and transferred back to sitting EOB with verbal cues for RW safety management. She was educated in energy conservation techniques. Patient left sitting EOB with family in room and all needs in 
Infectious Disease Progress Note      Today's Date: 3/23/2025   Admit Date: 3/19/2025  YOB: 1960    Impression:   MSSA bacteremia 3/19/25.  Unclear source but possibly skin.  Patient receives spinal injections and Cimzia injections.  Last Cimzia injection was this past Monday 3/17/25.  Last spine (epidural steroid) injection was 1/6/25.         Urine culture 3/19/25 positive for MSSA.  MSSA is not a normal bacteria in urine.  Patient has not had any recent urologic interventions and no signs/symptoms associated with UTI.  In most cases, MSSA in the urine is a symptom of bloodstream infection, not the cause  Urine culture 6/7/24 positive for MSSA in Care Everywhere   Developing bilateral perihilar and right infrahilar intersitial groundglass opacifications, small right pleural effusion per chest x-ray 3/21/25  Chronic back pain with L4-L5 anterolisthesis with facet arthropathy and mild left foraminal stenosis, left paracentral L4-L5 extrusion with foraminal stenosis/left subarticular zone stenosis per MRI 2/9/25.  Patient denies worsening of back pain or mobility currently.         Repeat MRI with L3-L4 facet effusion, not markedly worsened from prior MRI  DM: Hgb A1C 6.2 on 10/14/24  RA:  Cimzia, Methotrexate, Prednisone 5 mg BID    Lung nodule-needs to be followed up on    Plan:   Still unclear what original source was, would favor lung, possibly urine.  Still given presence of small facet effusion of spine, will treat conservatively with a total of 6 weeks of total antibiotics  We are just shy of 48 hours without growth from repeat cultures from 3/21, tomorrow morning if no growth detected would be reasonable to place PICC line  Plan will be 4 weeks of Ancef 2 g every 8 hours followed by oral cefadroxil 1 g twice daily to complete 6 total weeks of therapy from cleared cultures  Prelim antibiotic plan:    1) Ancef 2 g IV every 8 hours with EOT 4/18/2025; followed by cefadroxil 1 g twice daily 2 weeks 
Infectious Disease Progress Note      Today's Date: 3/24/2025   Admit Date: 3/19/2025  YOB: 1960    Impression:   MSSA Bacteremia  (3/19/25).  Repeat 3/21 negative   Unclear source but possibly skin. Patient receives spinal injections and Cimzia injections.  Last Cimzia injection was past Monday 3/17/25.  Last spine (epidural steroid) injection was 1/6/25.  Still unclear what original source was, would favor lung, possibly urine. Given presence of small facet effusion of spine, will treat conservatively with a total of 6 weeks of total antibiotics      Urine culture 3/19/25 positive for MSSA.  MSSA is not a normal bacteria in urine.  Patient has not had any recent urologic interventions and no signs/symptoms associated with UTI.  In most cases, MSSA in the urine is a symptom of bloodstream infection, not the cause  Urine culture 6/7/24 positive for MSSA in Care Everywhere   Developing bilateral perihilar and right infrahilar intersitial groundglass opacifications, small right pleural effusion per chest x-ray 3/21/25  Chronic back pain with L4-L5 anterolisthesis with facet arthropathy and mild left foraminal stenosis, left paracentral L4-L5 extrusion with foraminal stenosis/left subarticular zone stenosis per MRI 2/9/25.  Patient denies worsening of back pain or mobility currently.         Repeat MRI with L3-L4 facet effusion, not markedly worsened from prior MRI  DM: Hgb A1C 6.2% on 10/14/24; impacts infection healing   RA:  Cimzia, Methotrexate, Prednisone 5 mg BID    Lung nodule-needs to be followed up on    Plan:   Okay to place PICC line now that repeat BCX cleared at 3 days.   Plan will be 4 weeks of Ancef 2 g every 8 hours EOT 4/18 followed by oral cefadroxil 1 g twice daily EOT 5/2/25 to complete 6 total weeks of therapy from cleared cultures. Monitor LFTs closely while on abx. Primary team, please send 2 weeks of Duricef at discharge for pt to start on 4/19/25.   OPAT orders as noted below and 
Please complete MRI screening form with signatures, thank you.  
discogenic disease  at L3-L4 with a small circumferential bulging annulus along with a left paracentral focal disc  extrusion resulting in mild left-sided subarticular recess effacement, No  abnormal enhancement concerning for an abscess or discitis, Small left-sided facet effusion at L3-L4.       Mild MARYJANE  -Monitor renal function  -Renally dose medications  -Avoid nephrotoxins and NSAIDs      Mildly elevated AST  -No history of alcohol  -ETOH level less than 11  -Trend and monitor     Hypertension  -On Amlodipine  -Keep systolic bp less than 160 and make adjustments as needed    Hypothyroidism  -On Synthroid     Hypokalemia  -Potassium replacement     Interstitial ground glass opacifications at bilateral perihilar regions and the right infrahilar region.   Bibasilar airspace disease   Small bilateral pleural effusions  -CXR -interval developing interstitial groundglass opacifications at bilateral perihilar regions and the right infrahilar region. This could be due to fluid overload.Crowding of the pulmonary vessels versus viral interstitial infiltrate at the right infrahilar region. Recommend clinical correlations.Blunting of the right costophrenic angle. This is due to a small pleural effusion/atelectasis.  -CT chest - bibasilar airspace disease with small bilateral pleural effusions, patchy foci of air entrapment noted throughout both lungs, a 1.4 cm juxtacardiac nodule- will refer patient to follow up with pulmonary as an outpatient  -Repeat CXR today showed No acute findings in the chest.  -IV fluids discontinued   -02 support to  keep 02 sat greater than 90%  -will refer patient to follow up with pulmonary as an outpatient.     Fever  -antipyretic prn     Mild hypernatremia  -Trend and monitor        Anticipated Discharge Arrangements:   Home Health    PT/OT evals ordered?  Therapy evals ordered  Diet:  ADULT DIET; Regular; 4 carb choices (60 gm/meal); Low Fat/Low Chol/High Fiber/2 gm Na  VTE prophylaxis: 
32.9 PG    MCHC 31.5 31.4 - 35.0 g/dL    RDW 15.5 (H) 11.9 - 14.6 %    Platelets 262 150 - 450 K/uL    MPV 12.1 9.4 - 12.3 FL    nRBC 0.00 0.0 - 0.2 K/uL    Neutrophils % 63.6 43.0 - 78.0 %    Lymphocytes % 22.4 13.0 - 44.0 %    Monocytes % 10.7 4.0 - 12.0 %    Eosinophils % 1.1 0.5 - 7.8 %    Basophils % 0.5 0.0 - 2.0 %    Immature Granulocytes % 1.7 0.0 - 5.0 %    Neutrophils Absolute 5.15 1.70 - 8.20 K/UL    Lymphocytes Absolute 1.81 0.50 - 4.60 K/UL    Monocytes Absolute 0.87 0.10 - 1.30 K/UL    Eosinophils Absolute 0.09 0.00 - 0.80 K/UL    Basophils Absolute 0.04 0.00 - 0.20 K/UL    Immature Granulocytes Absolute 0.14 0.0 - 0.5 K/UL    RBC Comment SLIGHT  ANISOCYTOSIS + POIKILOCYTOSIS        RBC Comment OCCASIONAL  POLYCHROMASIA        WBC Comment OCCASIONAL      Platelet Comment ADEQUATE      Differential Type AUTOMATED     Basic Metabolic Panel w/ Reflex to MG    Collection Time: 03/24/25  5:28 AM   Result Value Ref Range    Sodium 144 136 - 145 mmol/L    Potassium 4.0 3.5 - 5.1 mmol/L    Chloride 106 98 - 107 mmol/L    CO2 27 20 - 29 mmol/L    Anion Gap 11 7 - 16 mmol/L    Glucose 137 (H) 70 - 99 mg/dL    BUN 7 (L) 8 - 23 MG/DL    Creatinine 0.42 (L) 0.60 - 1.10 MG/DL    Est, Glom Filt Rate >90 >60 ml/min/1.73m2    Calcium 9.2 8.8 - 10.2 MG/DL   C-Reactive Protein    Collection Time: 03/24/25  5:28 AM   Result Value Ref Range    CRP 7.7 (H) 0.0 - 0.4 mg/dL   Hepatic Function Panel    Collection Time: 03/24/25  5:28 AM   Result Value Ref Range    Total Protein 6.6 6.3 - 8.2 g/dL    Albumin 2.5 (L) 3.2 - 4.6 g/dL    Globulin 4.1 (H) 2.3 - 3.5 g/dL    Albumin/Globulin Ratio 0.6 (L) 1.0 - 1.9      Total Bilirubin 0.2 0.0 - 1.2 MG/DL    Bilirubin, Direct <0.2 0.0 - 0.4 MG/DL    Alk Phosphatase 138 (H) 35 - 104 U/L    AST 86 (H) 15 - 37 U/L    ALT 77 (H) 8 - 45 U/L   POCT Glucose    Collection Time: 03/24/25  7:31 AM   Result Value Ref Range    POC Glucose 98 65 - 100 mg/dL    Performed by: Sabi        No

## 2025-03-25 NOTE — NURSE NAVIGATOR
This RN Navigator introduced self to patient and/or family at patient bedside. Patient informed that RN Navigator will be following patient for at least 30 days post discharge to act as a resource for any needs that may arise following discharge in relation to their sepsis diagnosis and treatment.     Patient verbalizes understanding of generalized education of sepsis disease process, s/s to monitor for and when to contact physician or visit Emergency Department.   Sepsis education sheet given to patient with this RN Navigator contact information. Patient informed they will be contacted 48-72 hours following discharge.   Contact information verified by patient and/or family member. RN Navigator will continue to follow.       Met with patient at bedside. Patient A&Ox3, IND at baseline, states she lives alone. Urine cultures resulted in MSSA growth; patient will be on IV antibiotic therapy at home for 4 weeks followed by oral antibiotics for additional 2 weeks.

## 2025-03-25 NOTE — FLOWSHEET NOTE
03/25/25 1136   LDAs at Discharge   Discharged with LDA Yes   LDA at Discharge PICC  (RUE Single lumen picc)   Reason Discharged with LDA Long term ATB use     Discharge paperwork and education given to patient. All questions answered to the best of my ability. Extender to patient's RUE single lumen PICC placed.

## 2025-03-25 NOTE — CARE COORDINATION
Patient is medically ready for discharge per attending. CM reviewed patient's previous CM documentation to confirm discharge plan. Patient's home health has been arranged with [facility]. PICC has been placed and [facility] has completed patient's training for home infusions. [facility] will have medications delivered to patient's confirmed delivery address today. No other CM needs noted at this time.

## 2025-03-25 NOTE — DISCHARGE SUMMARY
ventricular systolic function with a visually estimated EF of 60 - 65%. Left ventricle size is normal. Mildly increased wall thickness. Findings consistent with mild concentric hypertrophy. Normal wall motion. Normal diastolic function for age. Average E/e' ratio is 9.48.    Left Atrium: Left atrium is mildly dilated.    Sinus rhythm with heart rates in the mid to late 80s range noted during the study.  No obvious valvular vegetations noted.    Signed by: Jimmie Mcdermott on 3/22/2025  6:35 PM      Diagnostic Imaging/Tests:   XR CHEST 1 VIEW  Result Date: 3/23/2025  No acute findings in the chest. Underpenetrated study. Electronically signed by Andreygarcía Reyes    MRI LUMBAR SPINE W WO CONTRAST  Result Date: 3/22/2025  1.  Multilevel lumbar discogenic disease with level-by-level findings as described above. Greatest degenerative changes are seen at L3-L4 with a small circumferential bulging annulus along with a left paracentral focal disc extrusion resulting in mild left-sided subarticular recess effacement. No abnormal enhancement concerning for an abscess or discitis. 2.  Small left-sided facet effusion at L3-L4. Electronically signed by Jose Parikh MD    CT CHEST WO CONTRAST  Result Date: 3/21/2025  1. Bibasilar airspace disease with small bilateral pleural effusions. Patchy foci of air entrapment noted throughout both lungs. Furthermore there is a 1.4 cm juxtacardiac nodule. Follow-up is advised per Fleischner criteria. Positron emission tomography could be performed for further evaluation. Electronically signed by [employer] Eric    XR CHEST 1 VIEW  Result Date: 3/21/2025  Interval developing interstitial groundglass opacifications at bilateral perihilar regions and the right infrahilar region. This could be due to fluid overload. Crowding of the pulmonary vessels versus viral interstitial infiltrate at the right infrahilar region. Recommend clinical correlations. Blunting of the right costophrenic

## 2025-03-26 LAB
BACTERIA SPEC CULT: NORMAL
BACTERIA SPEC CULT: NORMAL
SERVICE CMNT-IMP: NORMAL
SERVICE CMNT-IMP: NORMAL

## 2025-03-26 NOTE — CARE COORDINATION
Call received from patient's [employer] insurance requesting update on patient's discharge disposition. Call placed and update provided at [phone] [phone].

## 2025-03-27 ENCOUNTER — FOLLOWUP TELEPHONE ENCOUNTER (OUTPATIENT)
Dept: CASE MANAGEMENT | Age: 65
End: 2025-03-27

## 2025-03-27 NOTE — PROGRESS NOTES
Post hospital discharge call made to patient. Patient stated she has  start services yesterday, [facility] made visit both days to instruct on IV ABT therapy. Patient states PT will start tomorrow per request; strength is very slowly returning. Patient states she is doing well at this time. No new needs identified. Navigator will continue to follow.

## 2025-03-31 LAB
ALBUMIN SERPL-MCNC: 3.3 G/DL (ref 3.2–4.6)
ALBUMIN/GLOB SERPL: 0.7 (ref 1–1.9)
ALP SERPL-CCNC: 139 U/L (ref 35–104)
ALT SERPL-CCNC: 36 U/L (ref 8–45)
AST SERPL-CCNC: 31 U/L (ref 15–37)
BASOPHILS # BLD: 0.03 K/UL (ref 0–0.2)
BASOPHILS NFR BLD: 0.3 % (ref 0–2)
BILIRUB DIRECT SERPL-MCNC: <0.2 MG/DL (ref 0–0.4)
BILIRUB SERPL-MCNC: 0.2 MG/DL (ref 0–1.2)
CREAT SERPL-MCNC: 0.63 MG/DL (ref 0.6–1.1)
CRP SERPL-MCNC: 2.7 MG/DL (ref 0–0.4)
DIFFERENTIAL METHOD BLD: ABNORMAL
EOSINOPHIL # BLD: 0.04 K/UL (ref 0–0.8)
EOSINOPHIL NFR BLD: 0.3 % (ref 0.5–7.8)
ERYTHROCYTE [DISTWIDTH] IN BLOOD BY AUTOMATED COUNT: 15.2 % (ref 11.9–14.6)
GLOBULIN SER CALC-MCNC: 4.7 G/DL (ref 2.3–3.5)
HCT VFR BLD AUTO: 43.2 % (ref 35.8–46.3)
HGB BLD-MCNC: 13.3 G/DL (ref 11.7–15.4)
IMM GRANULOCYTES # BLD AUTO: 0.05 K/UL (ref 0–0.5)
IMM GRANULOCYTES NFR BLD AUTO: 0.4 % (ref 0–5)
LYMPHOCYTES # BLD: 2.37 K/UL (ref 0.5–4.6)
LYMPHOCYTES NFR BLD: 20.5 % (ref 13–44)
MCH RBC QN AUTO: 29.8 PG (ref 26.1–32.9)
MCHC RBC AUTO-ENTMCNC: 30.8 G/DL (ref 31.4–35)
MCV RBC AUTO: 96.6 FL (ref 82–102)
MONOCYTES # BLD: 1.16 K/UL (ref 0.1–1.3)
MONOCYTES NFR BLD: 10 % (ref 4–12)
NEUTS SEG # BLD: 7.91 K/UL (ref 1.7–8.2)
NEUTS SEG NFR BLD: 68.5 % (ref 43–78)
NRBC # BLD: 0 K/UL (ref 0–0.2)
PLATELET # BLD AUTO: 471 K/UL (ref 150–450)
PMV BLD AUTO: 11.7 FL (ref 9.4–12.3)
PROT SERPL-MCNC: 8 G/DL (ref 6.3–8.2)
RBC # BLD AUTO: 4.47 M/UL (ref 4.05–5.2)
WBC # BLD AUTO: 11.6 K/UL (ref 4.3–11.1)

## 2025-04-01 ENCOUNTER — CLINICAL DOCUMENTATION (OUTPATIENT)
Dept: PULMONOLOGY | Age: 65
End: 2025-04-01

## 2025-04-01 ENCOUNTER — TELEPHONE (OUTPATIENT)
Dept: PULMONOLOGY | Age: 65
End: 2025-04-01

## 2025-04-01 NOTE — TELEPHONE ENCOUNTER
CT chest 3/21/25 IMPRESSION:   1. Bibasilar airspace disease with small bilateral pleural effusions. Patchy   foci of air entrapment noted throughout both lungs. Furthermore there is a 1.4 cm juxtacardiac nodule. Follow-up is advised per Fleischner criteria. Positron   emission tomography could be performed for further evaluation.           Referral from Junior Guillen MD-R91.1 (ICD-10-CM) - Lung nodule     Recently admitted 3/19-3/25/2025-Altered Mental Status and Fever.  Never smoker.       I have spoken with patient. She is scheduled to see Ms Roy on Friday at 2:15 pm arrival.

## 2025-04-02 ENCOUNTER — FOLLOWUP TELEPHONE ENCOUNTER (OUTPATIENT)
Dept: CASE MANAGEMENT | Age: 65
End: 2025-04-02

## 2025-04-02 NOTE — PROGRESS NOTES
Follow up call made to patient. Patient states she is doing MUCH better, appetite has improved, strength is returning slowly. Patient states she was taken off RA meds during hospital stay. Rheumatologist informed patient she could continue taking Celebrex but not methotrexate; RA flare ups have decreased. Patient does continue taking IV antibiotic therapy; EOT 4/18/25. No new needs identified at this time. Navigator will continue to follow.

## 2025-04-04 ENCOUNTER — OFFICE VISIT (OUTPATIENT)
Dept: PULMONOLOGY | Age: 65
End: 2025-04-04
Payer: COMMERCIAL

## 2025-04-04 VITALS
SYSTOLIC BLOOD PRESSURE: 111 MMHG | TEMPERATURE: 98.7 F | WEIGHT: 259.6 LBS | HEART RATE: 97 BPM | RESPIRATION RATE: 20 BRPM | DIASTOLIC BLOOD PRESSURE: 70 MMHG | OXYGEN SATURATION: 94 % | BODY MASS INDEX: 40.74 KG/M2 | HEIGHT: 67 IN

## 2025-04-04 DIAGNOSIS — R91.1 PULMONARY NODULE 1 CM OR GREATER IN DIAMETER: Primary | ICD-10-CM

## 2025-04-04 PROCEDURE — 99214 OFFICE O/P EST MOD 30 MIN: CPT

## 2025-04-04 PROCEDURE — 1123F ACP DISCUSS/DSCN MKR DOCD: CPT

## 2025-04-04 NOTE — PROGRESS NOTES
Name:  Daja Armas  YOB: 1960   MRN: [id number]      Office Visit: 4/4/2025       Assessment & Plan (Medical Decision Making)    Impression: 65 y.o. female     1. Pulmonary nodule 1 cm or greater in diameter  - PET CT DOTATATE TUMOR IMAGE SKULL THIGH; Future  -DIOGENES 1.4cm juxtacardiac pulm nodule found on Chest CT while inpatient. She has no resp sx.     Nathaly Roy, APRN - CNP      Her risk of malignancy based on the [place] calculator is 15.2%. After review of scan with Dr. Gilbert the best plan will be to obtain a dotatate PET. If this is positive, a biopsy will be arranged. We will arrange for her to have cPFTs as well. If the dotatate is negative, FU CT scans will be arranged.      Total time for encounter on day of encounter was 39  minutes.  This time includes chart prep, review of tests/procedures, review of other provider's notes, documentation and counseling patient regarding disease process and medications.   _________________________________________________________________________    HISTORY OF PRESENT ILLNESS:    Ms. Daja Armas is a 65 y.o. female who is seen at [facility] for  Follow-Up from Hospital   Ms. Armas is a 65 year old female with a pmhx of rheumatoid arthritis (on prednisone), osteoarthritis, chronic pain, DDD, migraine, renal stone, DM2, HTN, HLD, hypothyroidism, GERD & morbid obesity. She is a never smoker. She has no personal or family history of cancer. She denies any prior exposures. She is here for follow up for a pulmonary nodule. She was recently admitted 3/19-3/25, presenting to the ED with AMS. Unfortunately, the police found her during a wellness check very confused covered in urine and emesis. Her temperature in the ED was 101.7 with MARYJANE and elevations of procal, troponin, AST, and WBC. She was treated for a MSSA UTI & bacteremia while admitted. Her head CT was negative. She was discharged with a PICC line for 4 weeks of Ancef to be followed by

## 2025-04-07 LAB
ALBUMIN SERPL-MCNC: 3.2 G/DL (ref 3.2–4.6)
ALBUMIN/GLOB SERPL: 0.7 (ref 1–1.9)
ALP SERPL-CCNC: 107 U/L (ref 35–104)
ALT SERPL-CCNC: 16 U/L (ref 8–45)
AST SERPL-CCNC: 30 U/L (ref 15–37)
BASOPHILS # BLD: 0.04 K/UL (ref 0–0.2)
BASOPHILS NFR BLD: 0.5 % (ref 0–2)
BILIRUB DIRECT SERPL-MCNC: <0.1 MG/DL (ref 0–0.3)
BILIRUB SERPL-MCNC: <0.2 MG/DL (ref 0–1.2)
CRP SERPL HS-MCNC: 9.8 MG/L (ref 0–3)
CRP SERPL-MCNC: 1.2 MG/DL (ref 0–0.4)
DIFFERENTIAL METHOD BLD: ABNORMAL
EOSINOPHIL # BLD: 0.11 K/UL (ref 0–0.8)
EOSINOPHIL NFR BLD: 1.4 % (ref 0.5–7.8)
ERYTHROCYTE [DISTWIDTH] IN BLOOD BY AUTOMATED COUNT: 14.9 % (ref 11.9–14.6)
GLOBULIN SER CALC-MCNC: 4.4 G/DL (ref 2.3–3.5)
HCT VFR BLD AUTO: 41.8 % (ref 35.8–46.3)
HGB BLD-MCNC: 13 G/DL (ref 11.7–15.4)
IMM GRANULOCYTES # BLD AUTO: 0.02 K/UL (ref 0–0.5)
IMM GRANULOCYTES NFR BLD AUTO: 0.3 % (ref 0–5)
LYMPHOCYTES # BLD: 3.12 K/UL (ref 0.5–4.6)
LYMPHOCYTES NFR BLD: 40.7 % (ref 13–44)
MCH RBC QN AUTO: 29.8 PG (ref 26.1–32.9)
MCHC RBC AUTO-ENTMCNC: 31.1 G/DL (ref 31.4–35)
MCV RBC AUTO: 95.9 FL (ref 82–102)
MONOCYTES # BLD: 0.98 K/UL (ref 0.1–1.3)
MONOCYTES NFR BLD: 12.8 % (ref 4–12)
NEUTS SEG # BLD: 3.39 K/UL (ref 1.7–8.2)
NEUTS SEG NFR BLD: 44.3 % (ref 43–78)
NRBC # BLD: 0 K/UL (ref 0–0.2)
PLATELET # BLD AUTO: 407 K/UL (ref 150–450)
PMV BLD AUTO: 12.2 FL (ref 9.4–12.3)
PROT SERPL-MCNC: 7.6 G/DL (ref 6.3–8.2)
RBC # BLD AUTO: 4.36 M/UL (ref 4.05–5.2)
WBC # BLD AUTO: 7.7 K/UL (ref 4.3–11.1)

## 2025-04-08 LAB — CREAT SERPL-MCNC: 0.57 MG/DL (ref 0.6–1.1)

## 2025-04-10 ENCOUNTER — FOLLOWUP TELEPHONE ENCOUNTER (OUTPATIENT)
Dept: CASE MANAGEMENT | Age: 65
End: 2025-04-10

## 2025-04-10 NOTE — PROGRESS NOTES
Follow up call made with patient. Patient states she is doing really well, had an episode of BLE edema with erythema that was a case of RA flare-up due to being unable to take RA medications while on IV ABT. Appetite is good, no new needs identified at this time. Navigator will continue to follow.

## 2025-04-14 ENCOUNTER — CLINICAL SUPPORT (OUTPATIENT)
Dept: PULMONOLOGY | Age: 65
End: 2025-04-14
Payer: COMMERCIAL

## 2025-04-14 DIAGNOSIS — R91.1 PULMONARY NODULE 1 CM OR GREATER IN DIAMETER: Primary | ICD-10-CM

## 2025-04-14 LAB
ALBUMIN SERPL-MCNC: 3.2 G/DL (ref 3.2–4.6)
ALBUMIN/GLOB SERPL: 0.7 (ref 1–1.9)
ALP SERPL-CCNC: 101 U/L (ref 35–104)
ALT SERPL-CCNC: 11 U/L (ref 8–45)
AST SERPL-CCNC: 32 U/L (ref 15–37)
BASOPHILS # BLD: 0.02 K/UL (ref 0–0.2)
BASOPHILS NFR BLD: 0.2 % (ref 0–2)
BILIRUB DIRECT SERPL-MCNC: <0.1 MG/DL (ref 0–0.3)
BILIRUB SERPL-MCNC: <0.2 MG/DL (ref 0–1.2)
CREAT SERPL-MCNC: 0.53 MG/DL (ref 0.6–1.1)
CRP SERPL-MCNC: 1.3 MG/DL (ref 0–0.4)
DIFFERENTIAL METHOD BLD: ABNORMAL
EOSINOPHIL # BLD: 0.15 K/UL (ref 0–0.8)
EOSINOPHIL NFR BLD: 1.6 % (ref 0.5–7.8)
ERYTHROCYTE [DISTWIDTH] IN BLOOD BY AUTOMATED COUNT: 14.8 % (ref 11.9–14.6)
FEV 1 , POC: 2.1 L
FEV1 % PRED, POC: 82 %
FEV1/FVC, POC: NORMAL
FVC % PRED, POC: 76 %
FVC, POC: NORMAL
GLOBULIN SER CALC-MCNC: 4.3 G/DL (ref 2.3–3.5)
HCT VFR BLD AUTO: 41.9 % (ref 35.8–46.3)
HGB BLD-MCNC: 13.4 G/DL (ref 11.7–15.4)
IMM GRANULOCYTES # BLD AUTO: 0.03 K/UL (ref 0–0.5)
IMM GRANULOCYTES NFR BLD AUTO: 0.3 % (ref 0–5)
LYMPHOCYTES # BLD: 2.31 K/UL (ref 0.5–4.6)
LYMPHOCYTES NFR BLD: 24.1 % (ref 13–44)
MCH RBC QN AUTO: 29.8 PG (ref 26.1–32.9)
MCHC RBC AUTO-ENTMCNC: 32 G/DL (ref 31.4–35)
MCV RBC AUTO: 93.1 FL (ref 82–102)
MONOCYTES # BLD: 1 K/UL (ref 0.1–1.3)
MONOCYTES NFR BLD: 10.4 % (ref 4–12)
NEUTS SEG # BLD: 6.06 K/UL (ref 1.7–8.2)
NEUTS SEG NFR BLD: 63.4 % (ref 43–78)
NRBC # BLD: 0 K/UL (ref 0–0.2)
PLATELET # BLD AUTO: 268 K/UL (ref 150–450)
PMV BLD AUTO: 12.3 FL (ref 9.4–12.3)
PROT SERPL-MCNC: 7.4 G/DL (ref 6.3–8.2)
RBC # BLD AUTO: 4.5 M/UL (ref 4.05–5.2)
WBC # BLD AUTO: 9.6 K/UL (ref 4.3–11.1)

## 2025-04-14 PROCEDURE — 94729 DIFFUSING CAPACITY: CPT | Performed by: INTERNAL MEDICINE

## 2025-04-14 PROCEDURE — 94726 PLETHYSMOGRAPHY LUNG VOLUMES: CPT | Performed by: INTERNAL MEDICINE

## 2025-04-14 PROCEDURE — 94060 EVALUATION OF WHEEZING: CPT | Performed by: INTERNAL MEDICINE

## 2025-04-14 ASSESSMENT — PULMONARY FUNCTION TESTS
FVC_PERCENT_PREDICTED_POC: 76
FEV1_PERCENT_PREDICTED_POC: 82

## 2025-04-16 ENCOUNTER — HOSPITAL ENCOUNTER (OUTPATIENT)
Dept: PET IMAGING | Age: 65
Discharge: HOME | End: 2025-04-19
Payer: COMMERCIAL

## 2025-04-16 DIAGNOSIS — R91.1 PULMONARY NODULE 1 CM OR GREATER IN DIAMETER: ICD-10-CM

## 2025-04-16 PROCEDURE — A9587 GALLIUM GA-68: HCPCS

## 2025-04-16 PROCEDURE — 2500000003 HC RX 250 WO HCPCS

## 2025-04-16 PROCEDURE — 78815 PET IMAGE W/CT SKULL-THIGH: CPT

## 2025-04-16 PROCEDURE — 3430000000 HC RX DIAGNOSTIC RADIOPHARMACEUTICAL

## 2025-04-16 RX ORDER — SODIUM CHLORIDE 0.9 % (FLUSH) 0.9 %
20 SYRINGE (ML) INJECTION ONCE AS NEEDED
Status: COMPLETED | OUTPATIENT
Start: 2025-04-16 | End: 2025-04-16

## 2025-04-16 RX ADMIN — SODIUM CHLORIDE, PRESERVATIVE FREE 20 ML: 5 INJECTION INTRAVENOUS at 11:29

## 2025-04-16 RX ADMIN — 68GA-DOTATATE 5.4 MILLICURIE: KIT INTRAVENOUS at 11:29

## 2025-04-16 NOTE — PROGRESS NOTES
5  Name: Daja Armas  YOB: 1960  Gender: female  MRN: [id number]    CC: Back Pain (MRI results)       HPI: This is a 63 y.o. year old female who has many year history of lower back pain.  Was diagnosed with osteoarthritis several years ago and more recently been diagnosed with rheumatoid arthritis.  She has had to stop all NSAIDs recently but she is on prednisone and immunomodulator medications for rheumatoid arthritis.  She also has fibromyalgia.  She is felt that the pain has been fibromyalgia however she had more symptoms in the lower back it feels like a stabbing knife pain in the left lower back.  She does not have pain radiating into the legs at this time.  Years ago she did had radiofrequency ablation with Dr. Maradiaga and that was helpful.  In the past she has had lumbar injections that provide temporary relief.  She is interested in what treatment can be done for her back at this time.  She has severe rheumatoid and osteoarthritis that she is not able to participate in physical therapy.  It always seems to aggravate her DJD of the knees.  We did discuss potential aquatic therapy but she has a severe fear of water.    I ordered a new MRI of lumbar to evaluate.            12/20/2023     2:37 PM   AMB PAIN ASSESSMENT   Location of Pain Back   Severity of Pain 8   Quality of Pain Aching   Duration of Pain Persistent   Frequency of Pain Constant   Date Pain First Started 5/16/2013   Aggravating Factors Standing   Limiting Behavior Some   Result of Injury No   Work-Related Injury No   Are there other pain locations you wish to document? No            ROS/Meds/PSH/PMH/FH/SH: I personally reviewed the patient's collected intake data.  Below are the pertinents:    Allergies   Allergen Reactions    Codeine Nausea And Vomiting and Nausea Only     Pt states \"a lot of pain medications maker her sick\"    Penicillins Nausea And Vomiting and Nausea Only     Pt states possible allergy to PCN 
A referral to non surgical spine doctor was ordered. A referral for spine injection has been ordered.    
declines

## 2025-04-17 ENCOUNTER — FOLLOWUP TELEPHONE ENCOUNTER (OUTPATIENT)
Dept: CASE MANAGEMENT | Age: 65
End: 2025-04-17

## 2025-04-17 NOTE — PROGRESS NOTES
Navigator spoke with patient on Wednesday to ensure that PICC line would be OK to use for PET scan. Patient states she is doing well, no new needs identified at this time. Navigator will continue to follow until 4/25/25.

## 2025-04-25 ENCOUNTER — FOLLOWUP TELEPHONE ENCOUNTER (OUTPATIENT)
Dept: CASE MANAGEMENT | Age: 65
End: 2025-04-25

## 2025-04-30 ENCOUNTER — OFFICE VISIT (OUTPATIENT)
Dept: NEUROSURGERY | Age: 65
End: 2025-04-30
Payer: COMMERCIAL

## 2025-04-30 VITALS
WEIGHT: 259 LBS | HEIGHT: 67 IN | DIASTOLIC BLOOD PRESSURE: 60 MMHG | OXYGEN SATURATION: 96 % | HEART RATE: 95 BPM | BODY MASS INDEX: 40.65 KG/M2 | SYSTOLIC BLOOD PRESSURE: 144 MMHG

## 2025-04-30 DIAGNOSIS — M51.360 DEGENERATION OF INTERVERTEBRAL DISC OF LUMBAR REGION WITH DISCOGENIC BACK PAIN: ICD-10-CM

## 2025-04-30 DIAGNOSIS — M48.061 NEUROFORAMINAL STENOSIS OF LUMBAR SPINE: ICD-10-CM

## 2025-04-30 DIAGNOSIS — M47.816 LUMBAR FACET ARTHROPATHY: ICD-10-CM

## 2025-04-30 DIAGNOSIS — M43.16 SPONDYLOLISTHESIS OF LUMBAR REGION: Primary | ICD-10-CM

## 2025-04-30 PROCEDURE — 99204 OFFICE O/P NEW MOD 45 MIN: CPT | Performed by: NEUROLOGICAL SURGERY

## 2025-04-30 PROCEDURE — 1123F ACP DISCUSS/DSCN MKR DOCD: CPT | Performed by: NEUROLOGICAL SURGERY

## 2025-04-30 NOTE — PROGRESS NOTES
back & left leg    Complete tear of right rotator cuff     DDD (degenerative disc disease), lumbar     pt states has 2 bulging disc in lower back that are pinching nerves    History of migraine     ALLERGY RELATED    Kidney stone     Morbid obesity (HCC) 10/30/15    BMI 41.7    Nausea & vomiting     \"patch placed behind ear\" help prevent nausea    Severe motion sickness      Past Surgical History:   Procedure Laterality Date    CHOLECYSTECTOMY  1981    FRAGMENT KIDNEY STONE/ [facility]      KNEE ARTHROSCOPY Right 1991    ORTHOPEDIC SURGERY Left 2018    torn meniscues tear left knee    ORTHOPEDIC SURGERY Right 2004    wrist    OTHER SURGICAL HISTORY  2011    cauterization of nerves to left leg    ROTATOR CUFF REPAIR Right 11/2015    SINUS SURGERY PROC UNLISTED  2014    Dr. Farooq    TONSILLECTOMY       Allergies   Allergen Reactions    Codeine Nausea And Vomiting and Nausea Only     Pt states \"a lot of pain medications maker her sick\"    Penicillins Nausea And Vomiting and Nausea Only     Pt states possible allergy to PCN      Family History   Problem Relation Age of Onset    Hypertension Mother     Diabetes Father     Diabetes Brother     Diabetes Brother     Diabetes Brother     Breast Cancer Neg Hx       Social History     Socioeconomic History    Marital status:      Spouse name: Not on file    Number of children: Not on file    Years of education: Not on file    Highest education level: Not on file   Occupational History    Not on file   Tobacco Use    Smoking status: Never    Smokeless tobacco: Never   Substance and Sexual Activity    Alcohol use: Not Currently    Drug use: No    Sexual activity: Not on file   Other Topics Concern    Not on file   Social History Narrative    Not on file     Social Drivers of Health     Financial Resource Strain: Low Risk  (5/13/2024)    Received from [facility]    Overall Financial Resource Strain (CARDIA)     Difficulty of Paying Living Expenses: Not

## 2025-05-02 ENCOUNTER — OFFICE VISIT (OUTPATIENT)
Dept: PULMONOLOGY | Age: 65
End: 2025-05-02
Payer: COMMERCIAL

## 2025-05-02 VITALS
OXYGEN SATURATION: 94 % | WEIGHT: 259.3 LBS | BODY MASS INDEX: 40.7 KG/M2 | HEIGHT: 67 IN | HEART RATE: 87 BPM | RESPIRATION RATE: 18 BRPM | DIASTOLIC BLOOD PRESSURE: 68 MMHG | TEMPERATURE: 98.9 F | SYSTOLIC BLOOD PRESSURE: 102 MMHG

## 2025-05-02 DIAGNOSIS — R91.1 PULMONARY NODULE 1 CM OR GREATER IN DIAMETER: Primary | ICD-10-CM

## 2025-05-02 PROCEDURE — 1123F ACP DISCUSS/DSCN MKR DOCD: CPT

## 2025-05-02 PROCEDURE — 99213 OFFICE O/P EST LOW 20 MIN: CPT

## 2025-05-02 NOTE — PROGRESS NOTES
Updates: just saw amrit and he is wanting to perform fusion. Revisit ID on Monday. PCP next week. Weight is stable. Feeling ok. No new symptoms.       She underwent Dotatate PET for DIOGENES 1.4 cm pulmonary nodule. Her risk of malignancy was 15.2%.    Dotatate result: No focal abnormal uptake in the nodule in question.  PFT results: Suggestive of mild restriction most likely d/t body habitus with negative BD response. Normal diffusion capacity.     She has finished Ancef and she completed Duricef two days ago.     There are no prior CT Chest to review but the nodule appeared the same size on the Dotatate PET which was done about 3 weeks from the CT.           
upper lobe. Continued CT follow-up recommended to document  stability.                    Electronically signed by Jose Pena        Exam Ended: 04/16/25 12:50 EDT Last Resulted: 04/18/25 11:50 EDT           Echo:   ECHO (TTE) COMPLETE (PRN CONTRAST/BUBBLE/STRAIN/3D) 03/22/2025    Interpretation Summary    Left Ventricle: Normal left ventricular systolic function with a visually estimated EF of 60 - 65%. Left ventricle size is normal. Mildly increased wall thickness. Findings consistent with mild concentric hypertrophy. Normal wall motion. Normal diastolic function for age. Average E/e' ratio is 9.48.    Left Atrium: Left atrium is mildly dilated.    Sinus rhythm with heart rates in the mid to late 80s range noted during the study.  No obvious valvular vegetations noted.    [facility] Reference Info:                                                                                                                Immunization History   Administered Date(s) Administered    COVID-19, PFIZER GRAY top, DO NOT Dilute, (age 12 y+), IM, 30 mcg/0.3 mL 04/08/2022    COVID-19, PFIZER PURPLE top, DILUTE for use, (age 12 y+), 30mcg/0.3mL 09/09/2021, 10/01/2021    Influenza Virus Vaccine 10/01/2024     Past Medical History:   Diagnosis Date    Allergic rhinitis     Anterior soft tissue impingement 9/10/2015    Arthritis     back    Chronic pain     back & left leg    Complete tear of right rotator cuff     DDD (degenerative disc disease), lumbar     pt states has 2 bulging disc in lower back that are pinching nerves    History of migraine     ALLERGY RELATED    Kidney stone     Morbid obesity (HCC) 10/30/15    BMI 41.7    Nausea & vomiting     \"patch placed behind ear\" help prevent nausea    Severe motion sickness         Tobacco Use      Smoking status: Never      Smokeless tobacco: Never    Allergies   Allergen Reactions    Codeine Nausea And Vomiting and Nausea Only     Pt states \"a lot of pain medications maker her sick\"

## 2025-05-09 NOTE — PROGRESS NOTES
[facility]  Consultation Note  Virtual/Telephone Visit     Patient ID:  Name: Daja Armas  MRN: [id number]  AGE: 65 y.o.  : 1960    Date of Consultation:  May 16, 2025    CC:   Chief Complaint   Patient presents with    Back Problem         HPI:  Ms. Armas is a 65-year-old female who presents today for follow-up of low back pain. The patient was hospitalized for over a week due to MSSA sepsis, The patient experienced altered mental status, being in and out of consciousness for 2 days, and was unable to recall her name or place of work or her birthday. She was critically ill for 12 hours, experiencing profound weakness that rendered her unable to open a bottle of water or an orange Skyla. Initially, she suspected gastroenteritis due to vomiting. She did not hear her phone ringing and was unaware of people attempting to contact her.  She was found with altered mental status by first responders who were sent to her home for a wellness check when she did not arrive to work for a couple days.  She reported that she had never been this ill before.     She has rheumatoid arthritis, which is treated with Cimzia infusions.  She is currently unable to resume Cimzia until 4 weeks after completing her antibiotic course. She underwent a regimen of intravenous antibiotic therapy every 8 hours for 3.5 weeks post-hospitalization, followed by an additional 2 weeks of oral antibiotics.    The patient has been under my care for over a decade for analgesic therapy and management. She felt that she was able to manage her low back symptoms for many years with intermittent injections and activity modifications.  However, her symptoms have rapidly progressed over the last 6 months.  She has been using a walker since 2024. She is considering surgery but is awaiting clearance from the infectious disease specialist. She is not in a hurry for the surgery. She is aware that her recovery window is

## 2025-05-15 ENCOUNTER — TELEPHONE (OUTPATIENT)
Dept: NEUROSURGERY | Age: 65
End: 2025-05-15

## 2025-05-15 NOTE — TELEPHONE ENCOUNTER
Called patient and informed her that I had reached out to ID to obtain surgical clearance. Told her that I would call her back once we have it to proceed with L3-L5 TLIF. Patient voiced understanding.

## 2025-05-16 ENCOUNTER — TELEMEDICINE (OUTPATIENT)
Dept: ORTHOPEDIC SURGERY | Age: 65
End: 2025-05-16
Payer: COMMERCIAL

## 2025-05-16 DIAGNOSIS — M43.10 ACQUIRED SPONDYLOLISTHESIS: ICD-10-CM

## 2025-05-16 DIAGNOSIS — M54.16 LUMBAR RADICULOPATHY: Primary | ICD-10-CM

## 2025-05-16 PROCEDURE — 99214 OFFICE O/P EST MOD 30 MIN: CPT | Performed by: PHYSICAL MEDICINE & REHABILITATION

## 2025-05-16 PROCEDURE — 1123F ACP DISCUSS/DSCN MKR DOCD: CPT | Performed by: PHYSICAL MEDICINE & REHABILITATION

## 2025-05-28 ENCOUNTER — HOSPITAL ENCOUNTER (OUTPATIENT)
Dept: PET IMAGING | Age: 65
Discharge: HOME | End: 2025-05-31
Payer: COMMERCIAL

## 2025-05-28 DIAGNOSIS — R91.1 PULMONARY NODULE 1 CM OR GREATER IN DIAMETER: ICD-10-CM

## 2025-05-28 LAB
GLUCOSE BLD STRIP.AUTO-MCNC: 137 MG/DL (ref 65–100)
SERVICE CMNT-IMP: ABNORMAL

## 2025-05-28 PROCEDURE — 3430000000 HC RX DIAGNOSTIC RADIOPHARMACEUTICAL

## 2025-05-28 PROCEDURE — A9609 HC RX DIAGNOSTIC RADIOPHARMACEUTICAL: HCPCS

## 2025-05-28 PROCEDURE — 6360000004 HC RX CONTRAST MEDICATION

## 2025-05-28 PROCEDURE — 2500000003 HC RX 250 WO HCPCS

## 2025-05-28 PROCEDURE — 78815 PET IMAGE W/CT SKULL-THIGH: CPT

## 2025-05-28 PROCEDURE — 82962 GLUCOSE BLOOD TEST: CPT

## 2025-05-28 RX ORDER — FLUDEOXYGLUCOSE F 18 200 MCI/ML
13.78 INJECTION, SOLUTION INTRAVENOUS
Status: COMPLETED | OUTPATIENT
Start: 2025-05-28 | End: 2025-05-28

## 2025-05-28 RX ORDER — DIATRIZOATE MEGLUMINE AND DIATRIZOATE SODIUM 660; 100 MG/ML; MG/ML
10 SOLUTION ORAL; RECTAL
Status: DISCONTINUED | OUTPATIENT
Start: 2025-05-28 | End: 2025-06-01 | Stop reason: HOSPADM

## 2025-05-28 RX ORDER — SODIUM CHLORIDE 0.9 % (FLUSH) 0.9 %
20 SYRINGE (ML) INJECTION AS NEEDED
Status: DISCONTINUED | OUTPATIENT
Start: 2025-05-28 | End: 2025-06-01 | Stop reason: HOSPADM

## 2025-05-28 RX ADMIN — DIATRIZOATE MEGLUMINE AND DIATRIZOATE SODIUM 10 ML: 660; 100 LIQUID ORAL; RECTAL at 12:40

## 2025-05-28 RX ADMIN — FLUDEOXYGLUCOSE F 18 13.78 MILLICURIE: 200 INJECTION, SOLUTION INTRAVENOUS at 12:40

## 2025-05-28 RX ADMIN — SODIUM CHLORIDE, PRESERVATIVE FREE 20 ML: 5 INJECTION INTRAVENOUS at 12:40

## 2025-06-02 NOTE — PROGRESS NOTES
Nodule has had no uptake on both PET and dotatate PET. The nodule is measured smaller at 9mm on PET/CT but appears stable from original CT.   I have reviewed her case with Dr. Gilbert and will FU with Chest CT in 6 months.    See if her PCP can FU on renal mass and skin lesion.   She sees david some time in the next month.     She has been cleared by ID for MSSA and now back on her MTX and Cimzia.  Her SOB is improved recovered from sepsis.     Ortho surgery at the end of the year possibly?    Sherlyn, help ms talley schedule renal ct please and the 6 month ct

## 2025-06-06 ENCOUNTER — OFFICE VISIT (OUTPATIENT)
Dept: PULMONOLOGY | Age: 65
End: 2025-06-06
Payer: COMMERCIAL

## 2025-06-06 VITALS
RESPIRATION RATE: 14 BRPM | WEIGHT: 251 LBS | OXYGEN SATURATION: 97 % | HEIGHT: 67 IN | BODY MASS INDEX: 39.39 KG/M2 | SYSTOLIC BLOOD PRESSURE: 136 MMHG | HEART RATE: 78 BPM | DIASTOLIC BLOOD PRESSURE: 80 MMHG

## 2025-06-06 DIAGNOSIS — R91.1 PULMONARY NODULE 1 CM OR GREATER IN DIAMETER: Primary | ICD-10-CM

## 2025-06-06 DIAGNOSIS — N28.9 RENAL LESION: ICD-10-CM

## 2025-06-06 PROCEDURE — 99213 OFFICE O/P EST LOW 20 MIN: CPT

## 2025-06-06 PROCEDURE — 1123F ACP DISCUSS/DSCN MKR DOCD: CPT

## 2025-06-06 NOTE — PROGRESS NOTES
Name:  Daja Armas  YOB: 1960   MRN: [id number]      Office Visit: 6/6/2025       Assessment & Plan (Medical Decision Making)    Impression: 65 y.o. female     1. Pulmonary nodule 1 cm or greater in diameter  -DIOGENES 1.4cm juxtacardiac pulm nodule found on Chest CT while inpatient. She has no resp sx and feels her breathing has improved as she is recovering from sepsis.  -No prior chest Cts for comparison.   -Dotatate scan showed no abnormal uptake. PET scan shows no uptake.   -Nodule shows 3 month stability. Repeat Chest CT next in 6 months.   -If she requires a biopsy in the future, she will need a pre-anesthesia appt d/t previous issues with N/V.     2. Rheumatoid Arthritis  -Care per rheumatology. On MTX and Cimzia.  -RA can be a cause of pulmonary nodules.    3. MSSA Bacteremia  -She completed IV and PO antibiotics.  -She is feeling well. Back surgery will be at the end of the year to allow healing time from sepsis.     4. Renal Lesion  -17 mm hyperdense R renal lesion found on PET that is new. FU CT ordered.       Follow-up and Dispositions    Return in about 6 months (around 12/6/2025) for Scan review with me, pulm nodule.       BOB Huggins - CNP          PLAN & FOLLOW UP: Renal CT ordered - I will call her with the results. FU Chest CT and office visit will be in 6 months.       Total time for encounter on day of encounter was 29  minutes.  This time includes chart prep, review of tests/procedures, review of other provider's notes, documentation and counseling patient regarding disease process and medications.   _________________________________________________________________________    HISTORY OF PRESENT ILLNESS:    Ms. Daja Armas is a 65 y.o. female who is seen at [facility] for  Pulmonary Nodule and Follow-up   Ms. Armas is a 65 year old female with a pmhx of rheumatoid arthritis (on prednisone), osteoarthritis, chronic pain, DDD, migraine, renal stone, DM2, HTN,

## 2025-06-08 DIAGNOSIS — M47.816 FACET ARTHROPATHY, LUMBAR: ICD-10-CM

## 2025-06-09 ENCOUNTER — TELEPHONE (OUTPATIENT)
Dept: PULMONOLOGY | Age: 65
End: 2025-06-09

## 2025-06-09 NOTE — TELEPHONE ENCOUNTER
I have spoken with patient.  I have assisted in getting her scheduled for CT renal now and 6 month repeat CT chest.  She voices understanding of both appointments.

## 2025-06-12 NOTE — PROGRESS NOTES
Please pend to Dr. Mcclendon   She does not have stone or stone fragments in ureter. Pain is most likely d/t UTI. Will call with UC results. Take ATB as prescribed.

## 2025-06-17 ENCOUNTER — RESULTS FOLLOW-UP (OUTPATIENT)
Dept: PULMONOLOGY | Age: 65
End: 2025-06-17

## 2025-09-02 ENCOUNTER — HOSPITAL ENCOUNTER (OUTPATIENT)
Dept: GENERAL RADIOLOGY | Age: 65
Discharge: HOME | End: 2025-09-04
Payer: COMMERCIAL

## 2025-09-02 DIAGNOSIS — G89.29 CHRONIC LEFT SHOULDER PAIN: ICD-10-CM

## 2025-09-02 DIAGNOSIS — M25.512 CHRONIC LEFT SHOULDER PAIN: ICD-10-CM

## 2025-09-02 DIAGNOSIS — M54.2 NECK PAIN: ICD-10-CM

## 2025-09-02 PROCEDURE — 72050 X-RAY EXAM NECK SPINE 4/5VWS: CPT

## 2025-09-02 PROCEDURE — 73030 X-RAY EXAM OF SHOULDER: CPT

## (undated) DEVICE — STERILE HOOK LOCK LATEX FREE ELASTIC BANDAGE 4INX5YD: Brand: HOOK LOCK™

## (undated) DEVICE — 2000CC GUARDIAN II: Brand: GUARDIAN

## (undated) DEVICE — STOCKINETTE,IMPERVIOUS,12X48,STERILE: Brand: MEDLINE

## (undated) DEVICE — STERILE HOOK LOCK LATEX FREE ELASTIC BANDAGE 6INX5YD: Brand: HOOK LOCK™

## (undated) DEVICE — STRETCH BANDAGE ROLL: Brand: DERMACEA

## (undated) DEVICE — AMD ANTIMICROBIAL GAUZE SPONGES,12 PLY USP TYPE VII, 0.2% POLYHEXAMETHYLENE BIGUANIDE HCI (PHMB): Brand: CURITY

## (undated) DEVICE — INTENDED FOR TISSUE SEPARATION, AND OTHER PROCEDURES THAT REQUIRE A SHARP SURGICAL BLADE TO PUNCTURE OR CUT.: Brand: BARD-PARKER ® STAINLESS STEEL BLADES

## (undated) DEVICE — SET IRRIG L94IN ID0.281IN W/ 4.5IN DST FLX CONN 2 LD ON OFF

## (undated) DEVICE — GOWN,SIRUS,NONRNF,SETINSLV,XL,20/CS: Brand: MEDLINE

## (undated) DEVICE — NEEDLE HYPO 18GA L1.5IN THN WALL PIVOTING SHLD BVL ORIENTED

## (undated) DEVICE — SUTURE MCRYL SZ 2-0 L27IN ABSRB UD CP-1 1 L36MM 1/2 CIR REV Y266H

## (undated) DEVICE — PRECISION THIN (9.0 X 0.38 X 31.0MM)

## (undated) DEVICE — SOLUTION IV 1000ML 0.9% SOD CHL

## (undated) DEVICE — SOLUTION IRRIG 3000ML 0.9% SOD CHL FLX CONT 0797208] ICU MEDICAL INC]

## (undated) DEVICE — SET IRRIG DST FLX M CONN

## (undated) DEVICE — CURITY NON-ADHERENT STRIPS: Brand: CURITY

## (undated) DEVICE — SUTURE ETHLN SZ 2-0 L18IN NONABSORBABLE BLK L26MM PS 3/8 585H

## (undated) DEVICE — PADDING CAST W4INXL4YD ST COT COHESIVE HND TEARABLE SPEC

## (undated) DEVICE — Device

## (undated) DEVICE — NON-ADHERING DRESSING: Brand: ADAPTIC®

## (undated) DEVICE — TWINFIX 2 INCH SUTURE PASSER, STERILE: Brand: TWINFIX

## (undated) DEVICE — SUT ETHLN 3-0 18IN PS1 BLK --

## (undated) DEVICE — KENDALL SCD EXPRESS SLEEVES, KNEE LENGTH, MEDIUM: Brand: KENDALL SCD

## (undated) DEVICE — (D)PREP SKN CHLRAPRP APPL 26ML -- CONVERT TO ITEM 371833

## (undated) DEVICE — T-DRAPE,EXTREMITY,STERILE: Brand: MEDLINE

## (undated) DEVICE — SURGICAL PROCEDURE PACK BASIC ST FRANCIS

## (undated) DEVICE — DRILL 4MM FOR 4.5MM ANCHOR SL-PLUS

## (undated) DEVICE — AMD ANTIMICROBIAL BANDAGE ROLL,6 PLY: Brand: KERLIX

## (undated) DEVICE — REM POLYHESIVE ADULT PATIENT RETURN ELECTRODE: Brand: VALLEYLAB

## (undated) DEVICE — XEROFORM OCCLUSIVE GAUZE STRIP OVERWRAP, 3% BISMUTH TRIBROMOPHENATE IN PETROLATUM BLEND: Brand: XEROFORM

## (undated) DEVICE — LARGE TEAR CROSS CUT RASP (14.0 X 7.0MM)

## (undated) DEVICE — SPLINT MAT XF SPEC 5X30IN --

## (undated) DEVICE — ABDOMINAL PAD: Brand: DERMACEA

## (undated) DEVICE — SUTURE PDS II SZ 0 L27IN ABSRB VLT L26MM CT-2 1/2 CIR Z334H

## (undated) DEVICE — BUTTON SWITCH PENCIL BLADE ELECTRODE, HOLSTER: Brand: EDGE

## (undated) DEVICE — BANDAGE COMPR SELF ADH 5 YDX6 IN TAN STRL PREMIERPRO LF

## (undated) DEVICE — ZIMMER® STERILE DISPOSABLE TOURNIQUET CUFF WITH PLC, DUAL PORT, SINGLE BLADDER, 34 IN. (86 CM)

## (undated) DEVICE — BANDAGE,GAUZE,BULKEE II,4.5"X4.1YD,STRL: Brand: MEDLINE

## (undated) DEVICE — BLADE SHV CUT MENIS AGG + 4MM --

## (undated) DEVICE — SYR LR LCK 1ML GRAD NSAF 30ML --

## (undated) DEVICE — INTENDED FOR TISSUE SEPARATION, AND OTHER PROCEDURES THAT REQUIRE A SHARP SURGICAL BLADE TO PUNCTURE OR CUT.: Brand: BARD-PARKER SAFETY BLADES SIZE 15, STERILE